# Patient Record
Sex: FEMALE | Race: WHITE | NOT HISPANIC OR LATINO | Employment: OTHER | URBAN - METROPOLITAN AREA
[De-identification: names, ages, dates, MRNs, and addresses within clinical notes are randomized per-mention and may not be internally consistent; named-entity substitution may affect disease eponyms.]

---

## 2018-12-03 ENCOUNTER — OFFICE VISIT (OUTPATIENT)
Dept: FAMILY MEDICINE CLINIC | Facility: CLINIC | Age: 78
End: 2018-12-03
Payer: COMMERCIAL

## 2018-12-03 VITALS
TEMPERATURE: 98.1 F | HEART RATE: 74 BPM | SYSTOLIC BLOOD PRESSURE: 140 MMHG | BODY MASS INDEX: 19.36 KG/M2 | HEIGHT: 62 IN | RESPIRATION RATE: 14 BRPM | WEIGHT: 105.2 LBS | OXYGEN SATURATION: 98 % | DIASTOLIC BLOOD PRESSURE: 100 MMHG

## 2018-12-03 DIAGNOSIS — F03.90 DEMENTIA WITHOUT BEHAVIORAL DISTURBANCE, UNSPECIFIED DEMENTIA TYPE (HCC): ICD-10-CM

## 2018-12-03 DIAGNOSIS — Z12.11 COLON CANCER SCREENING: ICD-10-CM

## 2018-12-03 DIAGNOSIS — Z13.220 SCREENING, LIPID: ICD-10-CM

## 2018-12-03 DIAGNOSIS — B00.1 RECURRENT COLD SORES: ICD-10-CM

## 2018-12-03 DIAGNOSIS — I10 BENIGN HYPERTENSION: Primary | ICD-10-CM

## 2018-12-03 PROCEDURE — 3725F SCREEN DEPRESSION PERFORMED: CPT | Performed by: FAMILY MEDICINE

## 2018-12-03 PROCEDURE — 99204 OFFICE O/P NEW MOD 45 MIN: CPT | Performed by: FAMILY MEDICINE

## 2018-12-03 PROCEDURE — 3008F BODY MASS INDEX DOCD: CPT | Performed by: FAMILY MEDICINE

## 2018-12-03 RX ORDER — AMLODIPINE BESYLATE 5 MG/1
5 TABLET ORAL DAILY
Qty: 30 TABLET | Refills: 0 | Status: SHIPPED | OUTPATIENT
Start: 2018-12-03 | End: 2018-12-06 | Stop reason: HOSPADM

## 2018-12-03 RX ORDER — DONEPEZIL HYDROCHLORIDE 5 MG/1
5 TABLET, FILM COATED ORAL
Qty: 30 TABLET | Refills: 0 | Status: SHIPPED | OUTPATIENT
Start: 2018-12-03 | End: 2019-01-22 | Stop reason: SINTOL

## 2018-12-03 RX ORDER — BENAZEPRIL HYDROCHLORIDE 5 MG/1
5 TABLET, FILM COATED ORAL DAILY
COMMUNITY
End: 2018-12-06 | Stop reason: HOSPADM

## 2018-12-03 NOTE — ASSESSMENT & PLAN NOTE
Will start amlodipine 5 mg and advised to check the bp's at home record the numbers and f/u here in about 3-4 weeks

## 2018-12-03 NOTE — PATIENT INSTRUCTIONS
Discussed all with patient and her daughter  Given mini-mental status exam and scored 25/30  Will start low dose donepezil at night 5 mg and recheck in 3-4 weeks  Will also start amlodipine 5 mg once daily in the morning for the blood pressure  I would prefer to see blood pressures around 130s over 80s  Try to cut back on the salt in the diet  Have the lab work done fasting but drink water before the test   Also try to collect a stool sample before the next appointment  I will review all other previous records from Dr Katie White  Looks like you may need a DEXA scan repeated at the end of this year but I need to review your records first  Try to put some meat on your bones  At least 5 lbs will bring you up to a normal weight  DASH Eating Plan   WHAT YOU NEED TO KNOW:   The DASH (Dietary Approaches to Stop Hypertension) Eating Plan is designed to help prevent or lower high blood pressure  It can also help to lower LDL (bad) cholesterol and decrease your risk of heart disease  The plan is low in sodium, sugar, unhealthy fats, and total fat  It is high in potassium, calcium, magnesium, and fiber  These nutrients are added when you eat more fruits, vegetables, and whole grains  DISCHARGE INSTRUCTIONS:   Your sodium limit each day: Your dietitian will tell you how much sodium is safe for you to have each day  People with high blood pressure should have no more than 1,500 to 2,300 mg of sodium in a day  A teaspoon (tsp) of salt has 2,300 mg of sodium  This may seem like a difficult goal, but small changes to the foods you eat can make a big difference  Your healthcare provider or dietitian can help you create a meal plan that follows your sodium limit  How to limit sodium:   · Read food labels  Food labels can help you choose foods that are low in sodium  The amount of sodium is listed in milligrams (mg)   The % Daily Value (DV) column tells you how much of your daily needs are met by 1 serving of the food for each nutrient listed  Choose foods that have less than 5% of the DV of sodium  These foods are considered low in sodium  Foods that have 20% or more of the DV of sodium are considered high in sodium  Avoid foods that have more than 300 mg of sodium in each serving  Choose foods that say low-sodium, reduced-sodium, or no salt added on the food label  · Avoid salt  Do not salt food at the table, and add very little salt to foods during cooking  Use herbs and spices, such as onions, garlic, and salt-free seasonings to add flavor to foods  Try lemon or lime juice or vinegar to give foods a tart flavor  Use hot peppers or a small amount of hot pepper sauce to add a spicy flavor to foods  · Ask about salt substitutes  Ask your healthcare provider if you may use salt substitutes  Some salt substitutes have ingredients that can be harmful if you have certain health conditions  · Choose foods carefully at restaurants  Meals from restaurants, especially fast food restaurants, are often high in sodium  Some restaurants have nutrition information that tells you the amount of sodium in their foods  Ask to have your food prepared with less, or no salt  What you need to know about fats:   · Include healthy fats  Examples are unsaturated fats and omega-3 fatty acids  Unsaturated fats are found in soybean, canola, olive, or sunflower oil, and liquid and soft tub margarines  Omega-3 fatty acids are found in fatty fish, such as salmon, tuna, mackerel, and sardines  It is also found in flaxseed oil and ground flaxseed  · Avoid unhealthy fats  Do not eat unhealthy fats, such as saturated fats and trans fats  Saturated fats are found in foods that contain fat from animals  Examples are fatty meats, whole milk, butter, cream, and other dairy foods  It is also found in shortening, stick margarine, palm oil, and coconut oil   Trans fats are found in fried foods, crackers, chips, and baked goods made with margarine or shortening  Foods to include: With the DASH eating plan, you need to eat a certain number of servings from each food group  This will help you get enough of certain nutrients and limit others  The amount of servings you should eat depends on how many calories you need  Your dietitian can tell you how many calories you need  The number of servings listed next to the food groups below are for people who need about 2,000 calories each day    · Grains:  6 to 8 servings (3 of these servings should be whole-grain foods)    ¨ 1 slice of whole-grain bread     ¨ 1 ounce of dry cereal    ¨ ½ cup of cooked cereal, pasta, or brown rice    · Vegetables and fruits:  4 to 5 servings of fruits and 4 to 5 servings of vegetables    ¨ 1 medium fruit    ¨ ½ cup of frozen, canned (no added salt), or chopped fresh vegetables     ¨ ½ cup of fresh, frozen, dried, or canned fruit (canned in light syrup or fruit juice)    ¨ ½ cup of vegetable or fruit juice    · Dairy:  2 to 3 servings    ¨ 1 cup of nonfat (skim) or 1% milk    ¨ 1½ ounces of fat-free or low-fat cheese    ¨ 6 ounces of nonfat or low-fat yogurt    · Lean meat, poultry, and fish:  6 ounces or less    [employer] (chicken, turkey) with no skin    ¨ Fish (especially fatty fish, such as salmon, fresh tuna, or mackerel)    ¨ Lean beef and pork (loin, round, extra lean hamburger)    ¨ Egg whites and egg substitutes    · Nuts, seeds, and legumes:  4 to 5 servings each week    ¨ ½ cup of cooked beans and peas    ¨ 1½ ounces of unsalted nuts    ¨ 2 tablespoons of peanut butter or seeds    · Sweets and added sugars:  5 or less each week    ¨ 1 tablespoon of sugar, jelly, or jam    ¨ ½ cup of sorbet or gelatin    ¨ 1 cup of lemonade    · Fats:  2 to 3 servings each week    ¨ 1 teaspoon of soft margarine or vegetable oil    ¨ 1 tablespoon of mayonnaise    ¨ 2 tablespoons of salad dressing  Foods to avoid:   · Grains:      [employer], such as doughnuts, pastries, cookies, and biscuits (high in fat and sugar)    ¨ Mixes for cornbread and biscuits, packaged foods, such as bread stuffing, rice and pasta mixes, macaroni and cheese, and instant cereals (high in sodium)    · Fruits and vegetables:      ¨ Regular, canned vegetables (high in sodium)    ¨ Sauerkraut, pickled vegetables, and other foods prepared in brine (high in sodium)    ¨ Fried vegetables or vegetables in butter or high-fat sauces    ¨ Fruit in cream or butter sauce (high in fat)    · Dairy:      ¨ Whole milk, 2% milk, and cream (high in fat)    ¨ Regular cheese and processed cheese (high in fat and sodium)    · Meats and protein foods:      ¨ Smoked or cured meat, such as corned beef, dang, ham, hot dogs, and sausage (high in fat and sodium)    ¨ Canned beans and canned meats or spreads, such as potted meats, sardines, anchovies, and imitation seafood (high in sodium)    ¨ Deli or lunch meats, such as bologna, ham, turkey, and roast beef (high in sodium)    ¨ High-fat meat (T-bone steak, regular hamburger, and ribs)    ¨ Whole eggs and egg yolks (high in fat)    · Other:      ¨ Seasonings made with salt, such as garlic salt, celery salt, onion salt, seasoned salt, meat tenderizers, and monosodium glutamate (MSG)    ¨ Miso soup and canned or dried soup mixes (high in sodium)    ¨ Regular soy sauce, barbecue sauce, teriyaki sauce, steak sauce, Worcestershire sauce, and most flavored vinegars (high in sodium)    ¨ Regular condiments, such as mustard, ketchup, and salad dressings (high in sodium)    ¨ Gravy and sauces, such as Honorio or cheese sauces (high in sodium and fat)    ¨ Drinks high in sugar, such as soda or fruit drinks    [employer] foods, such as salted chips, popcorn, pretzels, pork rinds, salted crackers, and salted nuts    ¨ Frozen foods, such as dinners, entrees, vegetables with sauces, and breaded meats (high in sodium)  Other guidelines to follow:   · Maintain a healthy weight    Your risk for heart disease is higher if you are overweight  Your healthcare provider may suggest that you lose weight if you are overweight  You can lose weight by eating fewer calories and foods that have added sugars and fat  The DASH meal plan can help you do this  Decrease calories by eating smaller portions at each meal and fewer snacks  Ask your healthcare provider for more information about how to lose weight  · Exercise regularly  Regular exercise can help you reach or maintain a healthy weight  Regular exercise can also help decrease your blood pressure and improve your cholesterol levels  Get 30 minutes or more of moderate exercise each day of the week  To lose weight, get at least 60 minutes of exercise  Talk to your healthcare provider about the best exercise program for you  · Limit alcohol  Women should limit alcohol to 1 drink a day  Men should limit alcohol to 2 drinks a day  A drink of alcohol is 12 ounces of beer, 5 ounces of wine, or 1½ ounces of liquor  © 2017 [address] Information is for End User's use only and may not be sold, redistributed or otherwise used for commercial purposes  All illustrations and images included in CareNotes® are the copyrighted property of [employer]  or [employer]  The above information is an  only  It is not intended as medical advice for individual conditions or treatments  Talk to your doctor, nurse or pharmacist before following any medical regimen to see if it is safe and effective for you

## 2018-12-03 NOTE — PROGRESS NOTES
Assessment/Plan:     Chronic Problems:  Benign hypertension  Will start amlodipine 5 mg and advised to check the bp's at home record the numbers and f/u here in about 3-4 weeks  Dementia without behavioral disturbance  Will add in aricept at bedtime and follow in 3-4 weeks  Visit Diagnosis:  Diagnoses and all orders for this visit:    Benign hypertension  -     Comprehensive metabolic panel; Future  -     Microalbumin / creatinine urine ratio  -     Urinalysis with microscopic  -     amLODIPine (NORVASC) 5 mg tablet; Take 1 tablet (5 mg total) by mouth daily    Dementia without behavioral disturbance, unspecified dementia type  -     TSH, 3rd generation with Free T4 reflex; Future  -     Vitamin B12; Future  -     Lyme Antibody Profile with reflex to WB; Future  -     donepezil (ARICEPT) 5 mg tablet; Take 1 tablet (5 mg total) by mouth daily at bedtime    Colon cancer screening  -     Occult Blood, Fecal Immunochemical; Future    Screening, lipid  -     Lipid panel; Future    Recurrent cold sores  Comments: May want to try l-lysine and if still with cold sores would consider valtrex  Other orders  -     benazepril (LOTENSIN) 5 mg tablet; Take 5 mg by mouth daily          Subjective:    Patient ID: Keesha Brasher is a [de-identified] y o  female  Pt is here ac by her step-daughter, Olivia Felix  Pt was followed by Dr Conti but he retired  Pt has no special concerns re: being here today  Pt was on 2 5 mg of lisinopril but that was increased to 5 mg  Has a bp machine at home but does not know how to check it  Pt states Dr Riri Multani would not give her a new script for the lisinopril  Felt very dizzy when she stopped the meds and has not been on meds since September  Daughter in law has concerns re: pt's memory  Not currently on any meds  Pt was just seen by Dr Kimberlee Doran  Thinks she had a pap  Pt also has concerns that she has frequent cold sores           The following portions of the patient's history were reviewed and updated as appropriate: allergies, current medications, past family history, past medical history, past social history, past surgical history and problem list     Review of Systems   Constitutional: Negative for chills, diaphoresis, fatigue and fever  HENT: Negative  Eyes: Negative  Respiratory: Positive for cough (mild cough with minimal phlegm  )  Negative for shortness of breath and wheezing  Cardiovascular: Negative for chest pain and palpitations  Gastrointestinal: Negative  Last colonoscopy unknown  Genitourinary: Positive for frequency  Negative for dysuria and urgency  Musculoskeletal: Negative  Neurological: Negative for dizziness, light-headedness and headaches  Psychiatric/Behavioral: Negative for dysphoric mood  The patient is not nervous/anxious  Living alone since her  passed away  /100   Pulse 74   Temp 98 1 °F (36 7 °C)   Resp 14   Ht 5' 1 75" (1 568 m)   Wt 47 7 kg (105 lb 3 2 oz)   SpO2 98%   BMI 19 40 kg/m²   Social History     Social History    Marital status:      Spouse name: N/A    Number of children: N/A    Years of education: N/A     Occupational History    Not on file  Social History Main Topics    Smoking status: Never Smoker    Smokeless tobacco: Never Used    Alcohol use Not on file    Drug use: No    Sexual activity: No     Other Topics Concern    Not on file     Social History Narrative    No narrative on file     No past medical history on file  No family history on file  No past surgical history on file      Current Outpatient Prescriptions:     benazepril (LOTENSIN) 5 mg tablet, Take 5 mg by mouth daily, Disp: , Rfl:     amLODIPine (NORVASC) 5 mg tablet, Take 1 tablet (5 mg total) by mouth daily, Disp: 30 tablet, Rfl: 0    donepezil (ARICEPT) 5 mg tablet, Take 1 tablet (5 mg total) by mouth daily at bedtime, Disp: 30 tablet, Rfl: 0    Allergies   Allergen Reactions    Cefazolin Other reaction(s): Unknown          Lab Review   No visits with results within 2 Month(s) from this visit  Latest known visit with results is:   No results found for any previous visit  Imaging: No results found  Objective:     Physical Exam   Constitutional: She is oriented to person, place, and time  She appears well-developed and well-nourished  No distress  HENT:   Head: Normocephalic and atraumatic  Right Ear: External ear normal    Left Ear: External ear normal    Mouth/Throat: Oropharynx is clear and moist    Eyes: Pupils are equal, round, and reactive to light  Conjunctivae and EOM are normal  Right eye exhibits no discharge  Left eye exhibits no discharge  Neck: Normal range of motion  Neck supple  No thyromegaly present  Cardiovascular: Normal rate, regular rhythm and normal heart sounds  Exam reveals no friction rub  No murmur heard  Pulmonary/Chest: Effort normal and breath sounds normal  No respiratory distress  She has no wheezes  She has no rales  She exhibits no tenderness  Abdominal: Soft  Bowel sounds are normal  There is no tenderness  Musculoskeletal: Normal range of motion  She exhibits no edema, tenderness or deformity  Lymphadenopathy:     She has no cervical adenopathy  Neurological: She is alert and oriented to person, place, and time  No cranial nerve deficit  Pt is pleasantly forgetful  MMSE 25/51  Skin: Skin is warm and dry  No rash noted  She is not diaphoretic  Psychiatric: She has a normal mood and affect  Her behavior is normal  Judgment and thought content normal              Patient Instructions     Discussed all with patient and her daughter  Given mini-mental status exam and scored 25/30  Will start low dose donepezil at night 5 mg and recheck in 3-4 weeks  Will also start amlodipine 5 mg once daily in the morning for the blood pressure  I would prefer to see blood pressures around 130s over 80s  Try to cut back on the salt in the diet  Have the lab work done fasting but drink water before the test   Also try to collect a stool sample before the next appointment  I will review all other previous records from Dr Katie White  Looks like you may need a DEXA scan repeated at the end of this year but I need to review your records first  Try to put some meat on your bones  At least 5 lbs will bring you up to a normal weight  DASH Eating Plan   WHAT YOU NEED TO KNOW:   The DASH (Dietary Approaches to Stop Hypertension) Eating Plan is designed to help prevent or lower high blood pressure  It can also help to lower LDL (bad) cholesterol and decrease your risk of heart disease  The plan is low in sodium, sugar, unhealthy fats, and total fat  It is high in potassium, calcium, magnesium, and fiber  These nutrients are added when you eat more fruits, vegetables, and whole grains  DISCHARGE INSTRUCTIONS:   Your sodium limit each day: Your dietitian will tell you how much sodium is safe for you to have each day  People with high blood pressure should have no more than 1,500 to 2,300 mg of sodium in a day  A teaspoon (tsp) of salt has 2,300 mg of sodium  This may seem like a difficult goal, but small changes to the foods you eat can make a big difference  Your healthcare provider or dietitian can help you create a meal plan that follows your sodium limit  How to limit sodium:   · Read food labels  Food labels can help you choose foods that are low in sodium  The amount of sodium is listed in milligrams (mg)  The % Daily Value (DV) column tells you how much of your daily needs are met by 1 serving of the food for each nutrient listed  Choose foods that have less than 5% of the DV of sodium  These foods are considered low in sodium  Foods that have 20% or more of the DV of sodium are considered high in sodium  Avoid foods that have more than 300 mg of sodium in each serving   Choose foods that say low-sodium, reduced-sodium, or no salt added on the food label            · Avoid salt  Do not salt food at the table, and add very little salt to foods during cooking  Use herbs and spices, such as onions, garlic, and salt-free seasonings to add flavor to foods  Try lemon or lime juice or vinegar to give foods a tart flavor  Use hot peppers or a small amount of hot pepper sauce to add a spicy flavor to foods  · Ask about salt substitutes  Ask your healthcare provider if you may use salt substitutes  Some salt substitutes have ingredients that can be harmful if you have certain health conditions  · Choose foods carefully at restaurants  Meals from restaurants, especially fast food restaurants, are often high in sodium  Some restaurants have nutrition information that tells you the amount of sodium in their foods  Ask to have your food prepared with less, or no salt  What you need to know about fats:   · Include healthy fats  Examples are unsaturated fats and omega-3 fatty acids  Unsaturated fats are found in soybean, canola, olive, or sunflower oil, and liquid and soft tub margarines  Omega-3 fatty acids are found in fatty fish, such as salmon, tuna, mackerel, and sardines  It is also found in flaxseed oil and ground flaxseed  · Avoid unhealthy fats  Do not eat unhealthy fats, such as saturated fats and trans fats  Saturated fats are found in foods that contain fat from animals  Examples are fatty meats, whole milk, butter, cream, and other dairy foods  It is also found in shortening, stick margarine, palm oil, and coconut oil  Trans fats are found in fried foods, crackers, chips, and baked goods made with margarine or shortening  Foods to include: With the DASH eating plan, you need to eat a certain number of servings from each food group  This will help you get enough of certain nutrients and limit others  The amount of servings you should eat depends on how many calories you need  Your dietitian can tell you how many calories you need   The number of servings listed next to the food groups below are for people who need about 2,000 calories each day    · Grains:  6 to 8 servings (3 of these servings should be whole-grain foods)    ¨ 1 slice of whole-grain bread     ¨ 1 ounce of dry cereal    ¨ ½ cup of cooked cereal, pasta, or brown rice    · Vegetables and fruits:  4 to 5 servings of fruits and 4 to 5 servings of vegetables    ¨ 1 medium fruit    ¨ ½ cup of frozen, canned (no added salt), or chopped fresh vegetables     ¨ ½ cup of fresh, frozen, dried, or canned fruit (canned in light syrup or fruit juice)    ¨ ½ cup of vegetable or fruit juice    · Dairy:  2 to 3 servings    ¨ 1 cup of nonfat (skim) or 1% milk    ¨ 1½ ounces of fat-free or low-fat cheese    ¨ 6 ounces of nonfat or low-fat yogurt    · Lean meat, poultry, and fish:  6 ounces or less    [employer] (chicken, turkey) with no skin    ¨ Fish (especially fatty fish, such as salmon, fresh tuna, or mackerel)    ¨ Lean beef and pork (loin, round, extra lean hamburger)    ¨ Egg whites and egg substitutes    · Nuts, seeds, and legumes:  4 to 5 servings each week    ¨ ½ cup of cooked beans and peas    ¨ 1½ ounces of unsalted nuts    ¨ 2 tablespoons of peanut butter or seeds    · Sweets and added sugars:  5 or less each week    ¨ 1 tablespoon of sugar, jelly, or jam    ¨ ½ cup of sorbet or gelatin    ¨ 1 cup of lemonade    · Fats:  2 to 3 servings each week    ¨ 1 teaspoon of soft margarine or vegetable oil    ¨ 1 tablespoon of mayonnaise    ¨ 2 tablespoons of salad dressing  Foods to avoid:   · Grains:      [employer], such as doughnuts, pastries, cookies, and biscuits (high in fat and sugar)    ¨ Mixes for cornbread and biscuits, packaged foods, such as bread stuffing, rice and pasta mixes, macaroni and cheese, and instant cereals (high in sodium)    · Fruits and vegetables:      ¨ Regular, canned vegetables (high in sodium)    ¨ Sauerkraut, pickled vegetables, and other foods prepared in brine (high in sodium)    ¨ Fried vegetables or vegetables in butter or high-fat sauces    ¨ Fruit in cream or butter sauce (high in fat)    · Dairy:      ¨ Whole milk, 2% milk, and cream (high in fat)    ¨ Regular cheese and processed cheese (high in fat and sodium)    · Meats and protein foods:      ¨ Smoked or cured meat, such as corned beef, dang, ham, hot dogs, and sausage (high in fat and sodium)    ¨ Canned beans and canned meats or spreads, such as potted meats, sardines, anchovies, and imitation seafood (high in sodium)    ¨ Deli or lunch meats, such as bologna, ham, turkey, and roast beef (high in sodium)    ¨ High-fat meat (T-bone steak, regular hamburger, and ribs)    ¨ Whole eggs and egg yolks (high in fat)    · Other:      ¨ Seasonings made with salt, such as garlic salt, celery salt, onion salt, seasoned salt, meat tenderizers, and monosodium glutamate (MSG)    ¨ Miso soup and canned or dried soup mixes (high in sodium)    ¨ Regular soy sauce, barbecue sauce, teriyaki sauce, steak sauce, Worcestershire sauce, and most flavored vinegars (high in sodium)    ¨ Regular condiments, such as mustard, ketchup, and salad dressings (high in sodium)    ¨ Gravy and sauces, such as Honorio or cheese sauces (high in sodium and fat)    ¨ Drinks high in sugar, such as soda or fruit drinks    [employer] foods, such as salted chips, popcorn, pretzels, pork rinds, salted crackers, and salted nuts    ¨ Frozen foods, such as dinners, entrees, vegetables with sauces, and breaded meats (high in sodium)  Other guidelines to follow:   · Maintain a healthy weight  Your risk for heart disease is higher if you are overweight  Your healthcare provider may suggest that you lose weight if you are overweight  You can lose weight by eating fewer calories and foods that have added sugars and fat  The DASH meal plan can help you do this  Decrease calories by eating smaller portions at each meal and fewer snacks   Ask your healthcare provider for more information about how to lose weight  · Exercise regularly  Regular exercise can help you reach or maintain a healthy weight  Regular exercise can also help decrease your blood pressure and improve your cholesterol levels  Get 30 minutes or more of moderate exercise each day of the week  To lose weight, get at least 60 minutes of exercise  Talk to your healthcare provider about the best exercise program for you  · Limit alcohol  Women should limit alcohol to 1 drink a day  Men should limit alcohol to 2 drinks a day  A drink of alcohol is 12 ounces of beer, 5 ounces of wine, or 1½ ounces of liquor  © 2017 [facility] Information is for End User's use only and may not be sold, redistributed or otherwise used for commercial purposes  All illustrations and images included in CareNotes® are the copyrighted property of [employer]  or [employer]  The above information is an  only  It is not intended as medical advice for individual conditions or treatments  Talk to your doctor, nurse or pharmacist before following any medical regimen to see if it is safe and effective for you          DASIA Joaquin

## 2018-12-05 ENCOUNTER — APPOINTMENT (OUTPATIENT)
Dept: NON INVASIVE DIAGNOSTICS | Facility: HOSPITAL | Age: 78
End: 2018-12-05
Payer: COMMERCIAL

## 2018-12-05 ENCOUNTER — APPOINTMENT (EMERGENCY)
Dept: CT IMAGING | Facility: HOSPITAL | Age: 78
End: 2018-12-05
Payer: COMMERCIAL

## 2018-12-05 ENCOUNTER — HOSPITAL ENCOUNTER (OUTPATIENT)
Facility: HOSPITAL | Age: 78
Setting detail: OBSERVATION
Discharge: HOME | End: 2018-12-06
Attending: EMERGENCY MEDICINE | Admitting: INTERNAL MEDICINE
Payer: COMMERCIAL

## 2018-12-05 DIAGNOSIS — I71.2 ASCENDING AORTIC ANEURYSM (HCC): ICD-10-CM

## 2018-12-05 DIAGNOSIS — R19.7 NAUSEA, VOMITING, AND DIARRHEA: ICD-10-CM

## 2018-12-05 DIAGNOSIS — R11.2 NAUSEA, VOMITING, AND DIARRHEA: ICD-10-CM

## 2018-12-05 DIAGNOSIS — R91.1 PULMONARY NODULE: ICD-10-CM

## 2018-12-05 DIAGNOSIS — R55 NEAR SYNCOPE: ICD-10-CM

## 2018-12-05 DIAGNOSIS — R42 LIGHTHEADEDNESS: Primary | ICD-10-CM

## 2018-12-05 DIAGNOSIS — I10 BENIGN HYPERTENSION: ICD-10-CM

## 2018-12-05 PROBLEM — I71.21 ASCENDING AORTIC ANEURYSM: Status: ACTIVE | Noted: 2018-12-05

## 2018-12-05 LAB
ALBUMIN SERPL BCP-MCNC: 4.6 G/DL (ref 3.5–5)
ALP SERPL-CCNC: 77 U/L (ref 46–116)
ALT SERPL W P-5'-P-CCNC: 21 U/L (ref 12–78)
ANION GAP SERPL CALCULATED.3IONS-SCNC: 15 MMOL/L (ref 4–13)
APTT PPP: 25 SECONDS (ref 26–38)
AST SERPL W P-5'-P-CCNC: 18 U/L (ref 5–45)
ATRIAL RATE: 76 BPM
BASOPHILS # BLD AUTO: 0.08 THOUSANDS/ΜL (ref 0–0.1)
BASOPHILS NFR BLD AUTO: 1 % (ref 0–1)
BILIRUB DIRECT SERPL-MCNC: 0.33 MG/DL (ref 0–0.2)
BILIRUB SERPL-MCNC: 1.9 MG/DL (ref 0.2–1)
BILIRUB UR QL STRIP: NEGATIVE
BUN SERPL-MCNC: 16 MG/DL (ref 5–25)
CALCIUM SERPL-MCNC: 9.4 MG/DL (ref 8.3–10.1)
CHLORIDE SERPL-SCNC: 99 MMOL/L (ref 100–108)
CLARITY UR: CLEAR
CO2 SERPL-SCNC: 26 MMOL/L (ref 21–32)
COLOR UR: ABNORMAL
CREAT SERPL-MCNC: 0.98 MG/DL (ref 0.6–1.3)
EOSINOPHIL # BLD AUTO: 0.01 THOUSAND/ΜL (ref 0–0.61)
EOSINOPHIL NFR BLD AUTO: 0 % (ref 0–6)
ERYTHROCYTE [DISTWIDTH] IN BLOOD BY AUTOMATED COUNT: 12 % (ref 11.6–15.1)
FLUAV AG SPEC QL IA: NEGATIVE
FLUBV AG SPEC QL IA: NEGATIVE
GFR SERPL CREATININE-BSD FRML MDRD: 55 ML/MIN/1.73SQ M
GLUCOSE SERPL-MCNC: 132 MG/DL (ref 65–140)
GLUCOSE UR STRIP-MCNC: NEGATIVE MG/DL
HCT VFR BLD AUTO: 45.2 % (ref 34.8–46.1)
HGB BLD-MCNC: 15.6 G/DL (ref 11.5–15.4)
HGB UR QL STRIP.AUTO: NEGATIVE
IMM GRANULOCYTES # BLD AUTO: 0.02 THOUSAND/UL (ref 0–0.2)
IMM GRANULOCYTES NFR BLD AUTO: 0 % (ref 0–2)
INR PPP: 0.89 (ref 0.86–1.17)
KETONES UR STRIP-MCNC: ABNORMAL MG/DL
LACTATE SERPL-SCNC: 1.4 MMOL/L (ref 0.5–2)
LEUKOCYTE ESTERASE UR QL STRIP: NEGATIVE
LIPASE SERPL-CCNC: 125 U/L (ref 73–393)
LYMPHOCYTES # BLD AUTO: 0.75 THOUSANDS/ΜL (ref 0.6–4.47)
LYMPHOCYTES NFR BLD AUTO: 10 % (ref 14–44)
MAGNESIUM SERPL-MCNC: 1.9 MG/DL (ref 1.6–2.6)
MCH RBC QN AUTO: 32.1 PG (ref 26.8–34.3)
MCHC RBC AUTO-ENTMCNC: 34.5 G/DL (ref 31.4–37.4)
MCV RBC AUTO: 93 FL (ref 82–98)
MONOCYTES # BLD AUTO: 0.2 THOUSAND/ΜL (ref 0.17–1.22)
MONOCYTES NFR BLD AUTO: 3 % (ref 4–12)
NEUTROPHILS # BLD AUTO: 6.31 THOUSANDS/ΜL (ref 1.85–7.62)
NEUTS SEG NFR BLD AUTO: 86 % (ref 43–75)
NITRITE UR QL STRIP: NEGATIVE
NRBC BLD AUTO-RTO: 0 /100 WBCS
P AXIS: 59 DEGREES
PH UR STRIP.AUTO: 7.5 [PH] (ref 4.5–8)
PLATELET # BLD AUTO: 335 THOUSANDS/UL (ref 149–390)
PMV BLD AUTO: 9.8 FL (ref 8.9–12.7)
POTASSIUM SERPL-SCNC: 3.2 MMOL/L (ref 3.5–5.3)
PR INTERVAL: 170 MS
PROT SERPL-MCNC: 8.3 G/DL (ref 6.4–8.2)
PROT UR STRIP-MCNC: NEGATIVE MG/DL
PROTHROMBIN TIME: 12 SECONDS (ref 11.8–14.2)
QRS AXIS: -24 DEGREES
QRSD INTERVAL: 88 MS
QT INTERVAL: 422 MS
QTC INTERVAL: 474 MS
RBC # BLD AUTO: 4.86 MILLION/UL (ref 3.81–5.12)
SODIUM SERPL-SCNC: 140 MMOL/L (ref 136–145)
SP GR UR STRIP.AUTO: 1.01 (ref 1–1.03)
T WAVE AXIS: 73 DEGREES
TROPONIN I SERPL-MCNC: <0.02 NG/ML
TSH SERPL DL<=0.05 MIU/L-ACNC: 3.01 UIU/ML (ref 0.36–3.74)
UROBILINOGEN UR QL STRIP.AUTO: 0.2 E.U./DL
VENTRICULAR RATE: 76 BPM
WBC # BLD AUTO: 7.37 THOUSAND/UL (ref 4.31–10.16)

## 2018-12-05 PROCEDURE — 84484 ASSAY OF TROPONIN QUANT: CPT | Performed by: EMERGENCY MEDICINE

## 2018-12-05 PROCEDURE — 96374 THER/PROPH/DIAG INJ IV PUSH: CPT

## 2018-12-05 PROCEDURE — 99285 EMERGENCY DEPT VISIT HI MDM: CPT

## 2018-12-05 PROCEDURE — 93005 ELECTROCARDIOGRAM TRACING: CPT

## 2018-12-05 PROCEDURE — 96361 HYDRATE IV INFUSION ADD-ON: CPT

## 2018-12-05 PROCEDURE — 81003 URINALYSIS AUTO W/O SCOPE: CPT | Performed by: EMERGENCY MEDICINE

## 2018-12-05 PROCEDURE — 94664 DEMO&/EVAL PT USE INHALER: CPT

## 2018-12-05 PROCEDURE — 74177 CT ABD & PELVIS W/CONTRAST: CPT

## 2018-12-05 PROCEDURE — 85025 COMPLETE CBC W/AUTO DIFF WBC: CPT | Performed by: EMERGENCY MEDICINE

## 2018-12-05 PROCEDURE — 85610 PROTHROMBIN TIME: CPT | Performed by: EMERGENCY MEDICINE

## 2018-12-05 PROCEDURE — 83735 ASSAY OF MAGNESIUM: CPT | Performed by: EMERGENCY MEDICINE

## 2018-12-05 PROCEDURE — 84443 ASSAY THYROID STIM HORMONE: CPT | Performed by: NURSE PRACTITIONER

## 2018-12-05 PROCEDURE — 83605 ASSAY OF LACTIC ACID: CPT | Performed by: EMERGENCY MEDICINE

## 2018-12-05 PROCEDURE — 80048 BASIC METABOLIC PNL TOTAL CA: CPT | Performed by: EMERGENCY MEDICINE

## 2018-12-05 PROCEDURE — 85730 THROMBOPLASTIN TIME PARTIAL: CPT | Performed by: EMERGENCY MEDICINE

## 2018-12-05 PROCEDURE — 71260 CT THORAX DX C+: CPT

## 2018-12-05 PROCEDURE — 94760 N-INVAS EAR/PLS OXIMETRY 1: CPT

## 2018-12-05 PROCEDURE — 70450 CT HEAD/BRAIN W/O DYE: CPT

## 2018-12-05 PROCEDURE — 87040 BLOOD CULTURE FOR BACTERIA: CPT | Performed by: EMERGENCY MEDICINE

## 2018-12-05 PROCEDURE — 83690 ASSAY OF LIPASE: CPT | Performed by: EMERGENCY MEDICINE

## 2018-12-05 PROCEDURE — 87631 RESP VIRUS 3-5 TARGETS: CPT | Performed by: EMERGENCY MEDICINE

## 2018-12-05 PROCEDURE — 80076 HEPATIC FUNCTION PANEL: CPT | Performed by: EMERGENCY MEDICINE

## 2018-12-05 PROCEDURE — 36415 COLL VENOUS BLD VENIPUNCTURE: CPT | Performed by: EMERGENCY MEDICINE

## 2018-12-05 PROCEDURE — 99220 PR INITIAL OBSERVATION CARE/DAY 70 MINUTES: CPT | Performed by: NURSE PRACTITIONER

## 2018-12-05 PROCEDURE — 93010 ELECTROCARDIOGRAM REPORT: CPT | Performed by: INTERNAL MEDICINE

## 2018-12-05 PROCEDURE — 93306 TTE W/DOPPLER COMPLETE: CPT

## 2018-12-05 RX ORDER — POTASSIUM CHLORIDE 20 MEQ/1
40 TABLET, EXTENDED RELEASE ORAL ONCE
Status: COMPLETED | OUTPATIENT
Start: 2018-12-05 | End: 2018-12-05

## 2018-12-05 RX ORDER — DONEPEZIL HYDROCHLORIDE 5 MG/1
5 TABLET, FILM COATED ORAL
Status: DISCONTINUED | OUTPATIENT
Start: 2018-12-05 | End: 2018-12-06 | Stop reason: HOSPADM

## 2018-12-05 RX ORDER — ONDANSETRON 2 MG/ML
4 INJECTION INTRAMUSCULAR; INTRAVENOUS ONCE
Status: COMPLETED | OUTPATIENT
Start: 2018-12-05 | End: 2018-12-05

## 2018-12-05 RX ORDER — LISINOPRIL 5 MG/1
5 TABLET ORAL DAILY
Status: DISCONTINUED | OUTPATIENT
Start: 2018-12-05 | End: 2018-12-06 | Stop reason: HOSPADM

## 2018-12-05 RX ORDER — LISINOPRIL 5 MG/1
5 TABLET ORAL DAILY
Status: DISCONTINUED | OUTPATIENT
Start: 2018-12-06 | End: 2018-12-05

## 2018-12-05 RX ORDER — SODIUM CHLORIDE 9 MG/ML
75 INJECTION, SOLUTION INTRAVENOUS CONTINUOUS
Status: DISCONTINUED | OUTPATIENT
Start: 2018-12-05 | End: 2018-12-06 | Stop reason: HOSPADM

## 2018-12-05 RX ORDER — ACETAMINOPHEN 325 MG/1
650 TABLET ORAL EVERY 6 HOURS PRN
Status: DISCONTINUED | OUTPATIENT
Start: 2018-12-05 | End: 2018-12-06 | Stop reason: HOSPADM

## 2018-12-05 RX ORDER — ONDANSETRON 2 MG/ML
4 INJECTION INTRAMUSCULAR; INTRAVENOUS EVERY 6 HOURS PRN
Status: DISCONTINUED | OUTPATIENT
Start: 2018-12-05 | End: 2018-12-06 | Stop reason: HOSPADM

## 2018-12-05 RX ADMIN — IOHEXOL 100 ML: 350 INJECTION, SOLUTION INTRAVENOUS at 13:41

## 2018-12-05 RX ADMIN — POTASSIUM CHLORIDE 40 MEQ: 1500 TABLET, EXTENDED RELEASE ORAL at 13:52

## 2018-12-05 RX ADMIN — SODIUM CHLORIDE 1000 ML: 0.9 INJECTION, SOLUTION INTRAVENOUS at 13:04

## 2018-12-05 RX ADMIN — DONEPEZIL HYDROCHLORIDE 5 MG: 5 TABLET ORAL at 21:24

## 2018-12-05 RX ADMIN — ONDANSETRON 4 MG: 2 INJECTION INTRAMUSCULAR; INTRAVENOUS at 13:03

## 2018-12-05 RX ADMIN — POTASSIUM CHLORIDE 40 MEQ: 1500 TABLET, EXTENDED RELEASE ORAL at 17:10

## 2018-12-05 RX ADMIN — SODIUM CHLORIDE 75 ML/HR: 0.9 INJECTION, SOLUTION INTRAVENOUS at 17:09

## 2018-12-05 RX ADMIN — LISINOPRIL 5 MG: 5 TABLET ORAL at 17:28

## 2018-12-05 NOTE — ASSESSMENT & PLAN NOTE
· Continue newly started Aricept 5 mg at HS by PCP on 12/3/2018  · PT/OT eval and treat for discharge recommendations as patient lives home alone

## 2018-12-05 NOTE — ASSESSMENT & PLAN NOTE
Likely due to dehydration from viral gastroenteritis with nausea vomiting  · Check orthostatic blood pressures every shift  · Start IV hydration  · Consider need for Luis Alberto stockings  · Monitor blood pressure closely

## 2018-12-05 NOTE — H&P
H&P- Tita Max 1940, [de-identified] y o  female MRN: [id number]    Unit/Bed#: ED 17 Encounter: [id number]    Primary Care Provider: DASIA Zhu   Date and time admitted to hospital: 12/5/2018 12:15 PM        Dizziness   Assessment & Plan    Likely due to dehydration from viral gastroenteritis with nausea vomiting  · Check orthostatic blood pressures every shift  · Start IV hydration  · Consider need for Luis Alberto stockings  · Monitor blood pressure closely     Nausea & vomiting   Assessment & Plan    Likely due to viral gastroenteritis  · Start IV hydration and IV Zofran as needed     Ascending aortic aneurysm [facility])   Assessment & Plan    Incidental finding on CT chest abdomen pelvis  CT revealed a dilated ascending aorta measuring 4 1 cm  · Follow-up echocardiogram  · Blood pressure control     Benign hypertension   Assessment & Plan    Blood pressure elevated on arrival at 183/88  Now 165/76  Incidentally found an ascending aortic aneurysm at 4 1 cm  · Ideally need to lower blood pressure in light of aortic aneurysm  · Follow-up echocardiogram  · Continue ACEi  · Hold Norvasc      Nodule of left lung   Assessment & Plan    CT chest abdomen pelvis:  Incidentally detected 3 mm nodule in the left lower lung  No routine follow-up is needed as patient is considered low risk for lung cancer  Outpatient follow-up with PCP     Dementia without behavioral disturbance   Assessment & Plan    · Continue newly started Aricept 5 mg at HS by PCP on 12/3/2018  · PT/OT eval and treat for discharge recommendations as patient lives home alone       VTE Prophylaxis: Enoxaparin (Lovenox)  / sequential compression device   Code Status:  Full code level 1  POLST: POLST form is not discussed and not completed at this time  Discussion with family:  Daughter at bedside    Anticipated Length of Stay:  Patient will be admitted on an Observation basis with an anticipated length of stay of  Less than 2 midnights  Justification for Hospital Stay: dehydration, hypokalemia, vomiting, diarrhea    Total Time for Visit, including Counseling / Coordination of Care: 1 hour  Greater than 50% of this total time spent on direct patient counseling and coordination of care  Chief Complaint:   Vomiting, diarrhea     History of Present Illness:    John Sullivan is a [de-identified] y o  female with a PMH including dementia, HTN, CKD, osteoporosis who presents with several days of lightheadedness and dizziness associated with acute onset nausea, vomiting, and diarrhea  Patient is a relatively poor historian due to baseline dementia  Her daughter is at bedside and is a good historian  Patient cannot clearly state when her lightheadedness and dizziness started, however, states it has been going on for the past several days  States her nausea and vomiting started yesterday evening and continued until this morning  She reports 1 episode of diarrhea  She did see a new family doctor on Monday who started her on Norvasc and Aricept in addition to Benazepril  Patient states she has not taken blood pressure medication in 3 months because her previous family doctor took her off of her medications  Patient denies any headache, chest tightness, shortness of breath, abdominal pain, hematuria, dysuria, melena, or dark stools  Review of Systems:    Review of Systems   Constitutional: Positive for appetite change  Negative for chills and fever  HENT: Negative for congestion, postnasal drip, rhinorrhea and sore throat  Eyes: Negative for photophobia and visual disturbance  Respiratory: Negative for cough, chest tightness, shortness of breath and wheezing  Cardiovascular: Negative for chest pain, palpitations and leg swelling  Gastrointestinal: Positive for diarrhea, nausea and vomiting  Negative for abdominal distention, abdominal pain and constipation  Genitourinary: Negative for difficulty urinating, dysuria and hematuria  Musculoskeletal: Negative for arthralgias, gait problem and myalgias  Skin: Negative for rash and wound  Neurological: Positive for dizziness and light-headedness  Negative for weakness, numbness and headaches  Psychiatric/Behavioral: Negative for confusion  The patient is not nervous/anxious  Past Medical and Surgical History:     Past Medical History:   Diagnosis Date    CKD (chronic kidney disease) stage 2, GFR 60-89 ml/min     Colon polyps     Dementia     Hyperlipidemia     Hypertension     Insomnia     Osteoporosis     Uterine fibroid        History reviewed  No pertinent surgical history  Meds/Allergies:    Prior to Admission medications    Medication Sig Start Date End Date Taking? Authorizing Provider   amLODIPine (NORVASC) 5 mg tablet Take 1 tablet (5 mg total) by mouth daily 12/3/18   DASIA Davies   benazepril (LOTENSIN) 5 mg tablet Take 5 mg by mouth daily    Historical Provider, MD   donepezil (ARICEPT) 5 mg tablet Take 1 tablet (5 mg total) by mouth daily at bedtime 12/3/18   DASIA Davies     I have reviewed home medications with patient personally  Allergies: Allergies   Allergen Reactions    Cefazolin      Other reaction(s): Unknown       Social History:     Marital Status:    Occupation:  Retired  Patient Pre-hospital Living Situation:  , home alone in a bilevel house  Patient Pre-hospital Level of Mobility:  Independent  Does have a walker and cane but does not use them  Continues to drive  Patient Pre-hospital Diet Restrictions:  Low sodium  Substance Use History:   History   Alcohol Use    4 2 oz/week    7 Glasses of wine per week     History   Smoking Status    Never Smoker   Smokeless Tobacco    Never Used     History   Drug Use No       Family History:    History reviewed  No pertinent family history      Physical Exam:     Vitals:   Blood Pressure: 165/76 (12/05/18 1500)  Pulse: 83 (12/05/18 1500)  Temperature: 97 7 °F (36 5 °C) (12/05/18 1225)  Temp Source: Oral (12/05/18 1225)  Respirations: 18 (12/05/18 1500)  Weight - Scale: 52 8 kg (116 lb 6 5 oz) (12/05/18 1221)  SpO2: 97 % (12/05/18 1500)    Physical Exam   Constitutional: She is oriented to person, place, and time  She appears well-developed  No distress  HENT:   Head: Normocephalic  Neck: Normal range of motion  Cardiovascular: Normal rate, regular rhythm and intact distal pulses  Murmur heard  Pulmonary/Chest: Effort normal  No accessory muscle usage  No tachypnea  No respiratory distress  She has decreased breath sounds in the right upper field, the right lower field, the left upper field and the left lower field  She has no wheezes  She has no rhonchi  She has no rales  Abdominal: Soft  Bowel sounds are normal  She exhibits no distension  There is no tenderness  Musculoskeletal: Normal range of motion  She exhibits no edema or tenderness  Neurological: She is alert and oriented to person, place, and time  Forgetful   Skin: Skin is warm and dry  She is not diaphoretic  Psychiatric: She has a normal mood and affect  Her behavior is normal    Nursing note and vitals reviewed  Additional Data:     Lab Results: I have personally reviewed pertinent reports          Results from last 7 days  Lab Units 12/05/18  1247   WBC Thousand/uL 7 37   HEMOGLOBIN g/dL 15 6*   HEMATOCRIT % 45 2   PLATELETS Thousands/uL 335   NEUTROS PCT % 86*   LYMPHS PCT % 10*   MONOS PCT % 3*   EOS PCT % 0       Results from last 7 days  Lab Units 12/05/18  1247   SODIUM mmol/L 140   POTASSIUM mmol/L 3 2*   CHLORIDE mmol/L 99*   CO2 mmol/L 26   BUN mg/dL 16   CREATININE mg/dL 0 98   ANION GAP mmol/L 15*   CALCIUM mg/dL 9 4   ALBUMIN g/dL 4 6   TOTAL BILIRUBIN mg/dL 1 90*   ALK PHOS U/L 77   ALT U/L 21   AST U/L 18   GLUCOSE RANDOM mg/dL 132       Results from last 7 days  Lab Units 12/05/18  1247   INR  0 89               Results from last 7 days  Lab Units 12/05/18  1247 LACTIC ACID mmol/L 1 4       Imaging: I have personally reviewed pertinent reports  CT chest abdomen pelvis w contrast   Final Result by Yeny Lawson MD (12/05 1500)      No acute consolidation      Incidentally detected 3 mm nodule in the left lower lung  Based on current Fleischner Society 2017 Guidelines on incidental pulmonary nodule, no routine follow-up is needed if the patient is considered low risk for lung cancer  If the patient is    considered high risk for lung cancer, 12 month follow-up non-contrast chest CT is recommended  Dilated ascending aorta measuring about 4 1 cm consider cardiac echo to evaluate for any evolving heart disease      No evidence of bowel obstruction   No acute inflammatory stranding   Small right inguinal hernia containing colonic loop without evidence of obstruction      Workstation performed: [id number]         CT head without contrast   Final Result by Yeny Lawson MD (12/05 1433)      No acute intracranial hemorrhage seen   Moderate periventricular and white matter hypodensity seen related to chronic small vessel ischemic                  Workstation performed: [id number]             EKG, Pathology, and Other Studies Reviewed on Admission:   · EKG:  Sinus rhythm with PVCs    Allscripts / Epic Records Reviewed: Yes     ** Please Note: This note has been constructed using a voice recognition system   **

## 2018-12-05 NOTE — ASSESSMENT & PLAN NOTE
CT chest abdomen pelvis:  Incidentally detected 3 mm nodule in the left lower lung  No routine follow-up is needed as patient is considered low risk for lung cancer    Outpatient follow-up with PCP

## 2018-12-05 NOTE — ED PROVIDER NOTES
History  Chief Complaint   Patient presents with    Vomiting     pt c/o nausea vomiting and diarrhea for the past few days     Patient is an 78-year-old female with past medical history of dementia, hypertension, chronic kidney disease, osteoporosis, presents to the emergency department for lightheadedness and dizziness as well as acute nausea, vomiting and diarrhea  Patient overall is a very poor historian, likely due to her history of dementia  She cannot give me clear onset of symptoms but it seems as though for the past several days she has been feeling lightheaded  She states it initially was coming and going but she has had persistent lightheadedness since Monday  She did see her family doctor on Monday who started her on a new blood pressure medication, amlodipine as well as Aricept for dementia  She reports last night she had cream of chicken soup and almost immediately after eating that she became sick and has had several episodes of nonbilious and nonbloody vomiting as well as nonbloody diarrhea  She states every time she has to throw up and since last night it is been between 5 and 10 episodes, she also has to move her bowels and it is either releasing gas or diarrhea  She denies significant pain in her abdomen but states she feels as though she has gas  Review of systems, she also reports having recent coughing and cold-like symptoms  She denies any known fever, shaking chills, significant headache, vertigo, tinnitus or loss of hearing, change in vision, hemoptysis, chest pain, palpitations, dyspnea, abdominal pain or distention, hematemesis, bright red blood per rectum, melena, dysuria, change in urinary frequency, hematuria, flank pain, skin rash or color change, lateralizing extremity weakness or paresthesia or other focal neurologic deficits  Denies any known sick contacts or recent travel outside the country          History provided by:  Patient  History limited by:  Dementia  Language  used: No    Vomiting   Associated symptoms: cough and diarrhea    Associated symptoms: no abdominal pain, no chills, no fever, no headaches and no sore throat        Prior to Admission Medications   Prescriptions Last Dose Informant Patient Reported? Taking? amLODIPine (NORVASC) 5 mg tablet   No No   Sig: Take 1 tablet (5 mg total) by mouth daily   benazepril (LOTENSIN) 5 mg tablet   Yes No   Sig: Take 5 mg by mouth daily   donepezil (ARICEPT) 5 mg tablet   No No   Sig: Take 1 tablet (5 mg total) by mouth daily at bedtime      Facility-Administered Medications: None       Past Medical History:   Diagnosis Date    CKD (chronic kidney disease) stage 2, GFR 60-89 ml/min     Colon polyps     Dementia     Hyperlipidemia     Hypertension     Insomnia     Osteoporosis     Uterine fibroid        History reviewed  No pertinent surgical history  History reviewed  No pertinent family history  I have reviewed and agree with the history as documented  Social History   Substance Use Topics    Smoking status: Never Smoker    Smokeless tobacco: Never Used    Alcohol use 4 2 oz/week     7 Glasses of wine per week        Review of Systems   Constitutional: Negative for chills, diaphoresis and fever  HENT: Negative for congestion, ear pain, hearing loss, rhinorrhea, sore throat and tinnitus  Eyes: Negative for pain and visual disturbance  Respiratory: Positive for cough  Negative for chest tightness, shortness of breath and wheezing  Cardiovascular: Negative for chest pain and palpitations  Gastrointestinal: Positive for diarrhea, nausea and vomiting  Negative for abdominal distention, abdominal pain, blood in stool and constipation  Genitourinary: Negative for dysuria, flank pain, frequency and hematuria  Musculoskeletal: Negative for back pain, neck pain and neck stiffness  Skin: Negative for color change, pallor and rash  Allergic/Immunologic: Negative for immunocompromised state  Neurological: Positive for dizziness and light-headedness  Negative for syncope, facial asymmetry, speech difficulty, weakness, numbness and headaches  Hematological: Negative for adenopathy  Psychiatric/Behavioral: Negative for confusion and decreased concentration  All other systems reviewed and are negative  Physical Exam  Physical Exam   Constitutional: She is oriented to person, place, and time  She appears well-developed  No distress  Patient very thin and frail appearing  HENT:   Head: Normocephalic and atraumatic  Right Ear: External ear normal    Left Ear: External ear normal    Mouth/Throat: Oropharynx is clear and moist  No oropharyngeal exudate  Eyes: Pupils are equal, round, and reactive to light  Conjunctivae and EOM are normal    Neck: Normal range of motion  Neck supple  No JVD present  Cardiovascular: Normal rate, regular rhythm, normal heart sounds and intact distal pulses  Exam reveals no gallop and no friction rub  No murmur heard  Pulmonary/Chest: Effort normal and breath sounds normal  No respiratory distress  She has no wheezes  She has no rales  Abdominal: Soft  Bowel sounds are normal  She exhibits no distension  There is no tenderness  There is no rebound and no guarding  Musculoskeletal: Normal range of motion  She exhibits no edema or tenderness  Lymphadenopathy:     She has no cervical adenopathy  Neurological: She is alert and oriented to person, place, and time  No cranial nerve deficit  No gross motor or sensory deficits  Skin: Skin is warm and dry  No rash noted  She is not diaphoretic  No erythema  No pallor  Psychiatric: She has a normal mood and affect  Her behavior is normal    Nursing note and vitals reviewed        Vital Signs  ED Triage Vitals   Temperature Pulse Respirations Blood Pressure SpO2   12/05/18 1225 12/05/18 1221 12/05/18 1221 12/05/18 1221 12/05/18 1221   97 7 °F (36 5 °C) 77 16 (!) 183/88 97 %      Temp Source Heart Rate Source Patient Position - Orthostatic VS BP Location FiO2 (%)   12/05/18 1225 12/05/18 1221 12/05/18 1221 12/05/18 1221 --   Oral Monitor Sitting Left arm       Pain Score       12/05/18 1221       No Pain           Vitals:    12/05/18 1446 12/05/18 1500 12/05/18 1630 12/05/18 1728   BP: 168/85 165/76 170/88 (!) 189/111   Pulse: 73 83 68    Patient Position - Orthostatic VS: Lying - Orthostatic VS Lying Lying        Visual Acuity      ED Medications  Medications   donepezil (ARICEPT) tablet 5 mg (not administered)   sodium chloride 0 9 % infusion (75 mL/hr Intravenous New Bag 12/5/18 1709)   ondansetron (ZOFRAN) injection 4 mg (not administered)   acetaminophen (TYLENOL) tablet 650 mg (not administered)   lisinopril (ZESTRIL) tablet 5 mg (5 mg Oral Given 12/5/18 1728)   enoxaparin (LOVENOX) subcutaneous injection 40 mg (not administered)   sodium chloride 0 9 % bolus 1,000 mL (0 mL Intravenous Stopped 12/5/18 1710)   ondansetron (ZOFRAN) injection 4 mg (4 mg Intravenous Given 12/5/18 1303)   potassium chloride (K-DUR,KLOR-CON) CR tablet 40 mEq (40 mEq Oral Given 12/5/18 1352)   iohexol (OMNIPAQUE) 350 MG/ML injection (MULTI-DOSE) 100 mL (100 mL Intravenous Given 12/5/18 1341)   potassium chloride (K-DUR,KLOR-CON) CR tablet 40 mEq (40 mEq Oral Given 12/5/18 1710)       Diagnostic Studies  Results Reviewed     Procedure Component Value Units Date/Time    TSH, 3rd generation [[id number]]  (Normal) Collected:  12/05/18 1247    Lab Status:  Final result Specimen:  Blood from Arm, Left Updated:  12/05/18 1721     TSH 3RD GENERATON 3 014 uIU/mL     Narrative:         Patients undergoing fluorescein dye angiography may retain small amounts of fluorescein in the body for 48-72 hours post procedure  Samples containing fluorescein can produce falsely depressed TSH values  If the patient had this procedure,a specimen should be resubmitted post fluorescein clearance            The recommended reference ranges for TSH during pregnancy are as follows:  First trimester 0 1 to 2 5 uIU/mL  Second trimester  0 2 to 3 0 uIU/mL  Third trimester 0 3 to 3 0 uIU/m      UA w Reflex to Microscopic [[id number]]  (Abnormal) Collected:  12/05/18 1444    Lab Status:  Final result Specimen:  Urine from Urine, Clean Catch Updated:  12/05/18 1451     Color, UA Light Yellow     Clarity, UA Clear     Specific Gravity, UA 1 010     pH, UA 7 5     Leukocytes, UA Negative     Nitrite, UA Negative     Protein, UA Negative mg/dl      Glucose, UA Negative mg/dl      Ketones, UA 15 (1+) (A) mg/dl      Urobilinogen, UA 0 2 E U /dl      Bilirubin, UA Negative     Blood, UA Negative    Rapid Influenza Screen with Reflex PCR [[id number]]  (Normal) Collected:  12/05/18 1306    Lab Status:  Final result Specimen:  Nasopharyngeal from Nasopharyngeal Swab Updated:  12/05/18 1335     Rapid Influenza A Ag Negative     Rapid Influenza B Ag Negative    Influenza A/B and RSV by PCR [[id number]] Collected:  12/05/18 1306    Lab Status: In process Specimen:  Nasopharyngeal from Nasopharyngeal Swab Updated:  12/05/18 1335    Lactic acid, plasma [[id number]]  (Normal) Collected:  12/05/18 1247    Lab Status:  Final result Specimen:  Blood from Arm, Left Updated:  12/05/18 1323     LACTIC ACID 1 4 mmol/L     Narrative:         Result may be elevated if tourniquet was used during collection      Basic metabolic panel [[id number]]  (Abnormal) Collected:  12/05/18 1247    Lab Status:  Final result Specimen:  Blood from Arm, Left Updated:  12/05/18 1320     Sodium 140 mmol/L      Potassium 3 2 (L) mmol/L      Chloride 99 (L) mmol/L      CO2 26 mmol/L      ANION GAP 15 (H) mmol/L      BUN 16 mg/dL      Creatinine 0 98 mg/dL      Glucose 132 mg/dL      Calcium 9 4 mg/dL      eGFR 55 ml/min/1 73sq m     Narrative:         National Kidney Disease Education Program recommendations are as follows:  GFR calculation is accurate only with a steady state creatinine  Chronic Kidney disease less than 60 ml/min/1 73 sq  meters  Kidney failure less than 15 ml/min/1 73 sq  meters      Hepatic function panel [[id number]]  (Abnormal) Collected:  12/05/18 1247    Lab Status:  Final result Specimen:  Blood from Arm, Left Updated:  12/05/18 1320     Total Bilirubin 1 90 (H) mg/dL      Bilirubin, Direct 0 33 (H) mg/dL      Alkaline Phosphatase 77 U/L      AST 18 U/L      ALT 21 U/L      Total Protein 8 3 (H) g/dL      Albumin 4 6 g/dL     Magnesium [[id number]]  (Normal) Collected:  12/05/18 1247    Lab Status:  Final result Specimen:  Blood from Arm, Left Updated:  12/05/18 1320     Magnesium 1 9 mg/dL     Lipase [[id number]]  (Normal) Collected:  12/05/18 1247    Lab Status:  Final result Specimen:  Blood from Arm, Left Updated:  12/05/18 1320     Lipase 125 u/L     Troponin I [[id number]]  (Normal) Collected:  12/05/18 1247    Lab Status:  Final result Specimen:  Blood from Arm, Left Updated:  12/05/18 1319     Troponin I <0 02 ng/mL     Protime-INR [[id number]]  (Normal) Collected:  12/05/18 1247    Lab Status:  Final result Specimen:  Blood from Arm, Left Updated:  12/05/18 1318     Protime 12 0 seconds      INR 0 89    APTT [[id number]]  (Abnormal) Collected:  12/05/18 1247    Lab Status:  Final result Specimen:  Blood from Arm, Left Updated:  12/05/18 1318     PTT 25 (L) seconds     CBC and differential [[id number]]  (Abnormal) Collected:  12/05/18 1247    Lab Status:  Final result Specimen:  Blood from Arm, Left Updated:  12/05/18 1256     WBC 7 37 Thousand/uL      RBC 4 86 Million/uL      Hemoglobin 15 6 (H) g/dL      Hematocrit 45 2 %      MCV 93 fL      MCH 32 1 pg      MCHC 34 5 g/dL      RDW 12 0 %      MPV 9 8 fL      Platelets 122 Thousands/uL      nRBC 0 /100 WBCs      Neutrophils Relative 86 (H) %      Immat GRANS % 0 %      Lymphocytes Relative 10 (L) %      Monocytes Relative 3 (L) %      Eosinophils Relative 0 %      Basophils Relative 1 %      Neutrophils Absolute 6 31 Thousands/µL      Immature Grans Absolute 0 02 Thousand/uL      Lymphocytes Absolute 0 75 Thousands/µL      Monocytes Absolute 0 20 Thousand/µL      Eosinophils Absolute 0 01 Thousand/µL      Basophils Absolute 0 08 Thousands/µL     Blood culture #1 [[id number]]     Lab Status:  No result Specimen:  Blood     Blood culture #2 [[id number]]     Lab Status:  No result Specimen:  Blood                  CT chest abdomen pelvis w contrast   Final Result by Nilo West MD (12/05 1500)      No acute consolidation      Incidentally detected 3 mm nodule in the left lower lung  Based on current Fleischner Society 2017 Guidelines on incidental pulmonary nodule, no routine follow-up is needed if the patient is considered low risk for lung cancer  If the patient is    considered high risk for lung cancer, 12 month follow-up non-contrast chest CT is recommended        Dilated ascending aorta measuring about 4 1 cm consider cardiac echo to evaluate for any evolving heart disease      No evidence of bowel obstruction   No acute inflammatory stranding   Small right inguinal hernia containing colonic loop without evidence of obstruction      Workstation performed: [id number]         CT head without contrast   Final Result by Nilo West MD (12/05 1433)      No acute intracranial hemorrhage seen   Moderate periventricular and white matter hypodensity seen related to chronic small vessel ischemic                  Workstation performed: [id number]                    Procedures  ECG 12 Lead Documentation  Date/Time: 12/5/2018 12:51 PM  Performed by: Isaiah Shi by: Urban Morning     ECG reviewed by me, the ED Provider: yes    Patient location:  ED  Previous ECG:     Previous ECG:  Unavailable  Rate:     ECG rate:  76    ECG rate assessment: normal    Rhythm:     Rhythm: sinus rhythm    Ectopy:     Ectopy: PVCs      PVCs:  Frequent  QRS:     QRS axis:  Normal    QRS intervals:  Normal  Conduction:     Conduction: normal    ST segments:     ST segments:  Normal  T waves:     T waves: normal             Phone Contacts  ED Phone Contact    ED Course  ED Course as of Dec 05 1913   Wed Dec 05, 2018   1319 Troponin I: <0 02   1319 WBC: 7 37   1323 LACTIC ACID: 1 4   1324 Will replace with oral potassium  Potassium: (!) 3 2   1503 Leukocytes, UA: Negative   1503 Nitrite, UA: Negative   1504 Updated patient about workup thus far  Recommended observation admission to further hydrate and have PT evaluate patient  She lives alone and given the near-syncope, she is at risk of falling and possibly passing out without anyone available to help  Patient is agreeable  [phone] Patient not a smoker and never was  She reports her  was never smoker either so given the incidental pulmonary nodule, she could follow up with PCP but was likely not need reimaging  MDM  Number of Diagnoses or Management Options  Diagnosis management comments: 57-year-old female presents with lightheadedness/near syncope as well as acute nausea, vomiting and diarrhea  She states the dizziness started before her acute GI symptoms which started last night  The lightheadedness could be secondary to being started on new medications  Differential is vast however includes acute stroke, electrolyte or metabolic abnormality, worsening renal function, dehydration, bowel obstruction, diverticulitis, nonspecific enteritis or colitis, pneumonia  Will do full cardiac, septic and abdominal workup and obtain CT scan of the head, chest, abdomen and pelvis  Will hydrate with 1 L IV fluids and provide Zofran for nausea  She did denies any pain currently         Amount and/or Complexity of Data Reviewed  Clinical lab tests: ordered and reviewed  Tests in the radiology section of CPT®: ordered and reviewed  Tests in the medicine section of CPT®: ordered and reviewed  Independent visualization of images, tracings, or specimens: yes      CritCare Time    Disposition  Final diagnoses:   Lightheadedness   Near syncope   Nausea, vomiting, and diarrhea   Ascending aortic aneurysm (HCC)   Pulmonary nodule     Time reflects when diagnosis was documented in both MDM as applicable and the Disposition within this note     Time User Action Codes Description Comment    12/5/2018  3:13 PM Barbaraann [place] E Add [R42] Lightheadedness     12/5/2018  3:13 PM Barbaraann [place] E Add [R55] Near syncope     12/5/2018  3:13 PM Barbaraann [place] E Add [R11 2,  R19 7] Nausea, vomiting, and diarrhea     12/5/2018  3:14 PM Barbaraann [place] E Add [I71 2] Ascending aortic aneurysm ([address] )     12/5/2018  3:14 PM Barbaraann [place] E Add [R91 1] Pulmonary nodule       ED Disposition     ED Disposition Condition Comment    Admit  Case was discussed with [facility] and the patient's admission status was agreed to be Admission Status: observation status to the service of Dr Deisy Rangel          Follow-up Information    None         Current Discharge Medication List      CONTINUE these medications which have NOT CHANGED    Details   amLODIPine (NORVASC) 5 mg tablet Take 1 tablet (5 mg total) by mouth daily  Qty: 30 tablet, Refills: 0    Associated Diagnoses: Benign hypertension      benazepril (LOTENSIN) 5 mg tablet Take 5 mg by mouth daily      donepezil (ARICEPT) 5 mg tablet Take 1 tablet (5 mg total) by mouth daily at bedtime  Qty: 30 tablet, Refills: 0    Associated Diagnoses: Dementia without behavioral disturbance, unspecified dementia type           No discharge procedures on file      ED Provider  Electronically Signed by           Rl Cates DO  12/05/18 8588

## 2018-12-05 NOTE — ASSESSMENT & PLAN NOTE
Incidental finding on CT chest abdomen pelvis  CT revealed a dilated ascending aorta measuring 4 1 cm     · Follow-up echocardiogram  · Blood pressure control

## 2018-12-06 ENCOUNTER — TELEPHONE (OUTPATIENT)
Dept: FAMILY MEDICINE CLINIC | Facility: CLINIC | Age: 78
End: 2018-12-06

## 2018-12-06 VITALS
OXYGEN SATURATION: 96 % | DIASTOLIC BLOOD PRESSURE: 60 MMHG | TEMPERATURE: 97.7 F | WEIGHT: 116.4 LBS | RESPIRATION RATE: 18 BRPM | BODY MASS INDEX: 21.98 KG/M2 | HEART RATE: 59 BPM | HEIGHT: 61 IN | SYSTOLIC BLOOD PRESSURE: 118 MMHG

## 2018-12-06 LAB
ANION GAP SERPL CALCULATED.3IONS-SCNC: 9 MMOL/L (ref 4–13)
BUN SERPL-MCNC: 16 MG/DL (ref 5–25)
CALCIUM SERPL-MCNC: 8.7 MG/DL (ref 8.3–10.1)
CHLORIDE SERPL-SCNC: 107 MMOL/L (ref 100–108)
CO2 SERPL-SCNC: 24 MMOL/L (ref 21–32)
CREAT SERPL-MCNC: 1.12 MG/DL (ref 0.6–1.3)
ERYTHROCYTE [DISTWIDTH] IN BLOOD BY AUTOMATED COUNT: 12.3 % (ref 11.6–15.1)
FLUAV AG SPEC QL: NORMAL
FLUBV AG SPEC QL: NORMAL
GFR SERPL CREATININE-BSD FRML MDRD: 47 ML/MIN/1.73SQ M
GLUCOSE P FAST SERPL-MCNC: 107 MG/DL (ref 65–99)
GLUCOSE SERPL-MCNC: 107 MG/DL (ref 65–140)
HCT VFR BLD AUTO: 38.7 % (ref 34.8–46.1)
HGB BLD-MCNC: 13.3 G/DL (ref 11.5–15.4)
MAGNESIUM SERPL-MCNC: 2 MG/DL (ref 1.6–2.6)
MCH RBC QN AUTO: 32.6 PG (ref 26.8–34.3)
MCHC RBC AUTO-ENTMCNC: 34.4 G/DL (ref 31.4–37.4)
MCV RBC AUTO: 95 FL (ref 82–98)
PHOSPHATE SERPL-MCNC: 2.9 MG/DL (ref 2.3–4.1)
PLATELET # BLD AUTO: 297 THOUSANDS/UL (ref 149–390)
PMV BLD AUTO: 9.5 FL (ref 8.9–12.7)
POTASSIUM SERPL-SCNC: 5 MMOL/L (ref 3.5–5.3)
RBC # BLD AUTO: 4.08 MILLION/UL (ref 3.81–5.12)
RSV B RNA SPEC QL NAA+PROBE: NORMAL
SODIUM SERPL-SCNC: 140 MMOL/L (ref 136–145)
WBC # BLD AUTO: 6.49 THOUSAND/UL (ref 4.31–10.16)

## 2018-12-06 PROCEDURE — 84100 ASSAY OF PHOSPHORUS: CPT | Performed by: NURSE PRACTITIONER

## 2018-12-06 PROCEDURE — 93306 TTE W/DOPPLER COMPLETE: CPT | Performed by: INTERNAL MEDICINE

## 2018-12-06 PROCEDURE — G8979 MOBILITY GOAL STATUS: HCPCS

## 2018-12-06 PROCEDURE — G8988 SELF CARE GOAL STATUS: HCPCS

## 2018-12-06 PROCEDURE — G8978 MOBILITY CURRENT STATUS: HCPCS

## 2018-12-06 PROCEDURE — 85027 COMPLETE CBC AUTOMATED: CPT | Performed by: NURSE PRACTITIONER

## 2018-12-06 PROCEDURE — 97167 OT EVAL HIGH COMPLEX 60 MIN: CPT

## 2018-12-06 PROCEDURE — 99217 PR OBSERVATION CARE DISCHARGE MANAGEMENT: CPT | Performed by: INTERNAL MEDICINE

## 2018-12-06 PROCEDURE — 97163 PT EVAL HIGH COMPLEX 45 MIN: CPT

## 2018-12-06 PROCEDURE — G8987 SELF CARE CURRENT STATUS: HCPCS

## 2018-12-06 PROCEDURE — 83735 ASSAY OF MAGNESIUM: CPT | Performed by: NURSE PRACTITIONER

## 2018-12-06 PROCEDURE — 80048 BASIC METABOLIC PNL TOTAL CA: CPT | Performed by: NURSE PRACTITIONER

## 2018-12-06 RX ORDER — LISINOPRIL 5 MG/1
5 TABLET ORAL DAILY
Qty: 30 TABLET | Refills: 2 | Status: SHIPPED | OUTPATIENT
Start: 2018-12-07 | End: 2019-01-28 | Stop reason: SDUPTHER

## 2018-12-06 RX ADMIN — ENOXAPARIN SODIUM 40 MG: 40 INJECTION SUBCUTANEOUS at 09:02

## 2018-12-06 RX ADMIN — LISINOPRIL 5 MG: 5 TABLET ORAL at 09:02

## 2018-12-06 RX ADMIN — SODIUM CHLORIDE 75 ML/HR: 0.9 INJECTION, SOLUTION INTRAVENOUS at 05:30

## 2018-12-06 NOTE — TELEPHONE ENCOUNTER
Pt was recently in hospital overnight  With lightheadedness - the hospitalist changed her BP med to Lisinopril 5 mg - she was told to make an f/u , but there are no openings  with you when her daughter can bring her (only would like to see you )  She is asking if she can just keep her Jan 3rd appt    Instead - she is feeling ok     Call daughter back @ [phone]

## 2018-12-06 NOTE — RESPIRATORY THERAPY NOTE
RT Protocol Note  Dolph Charbel [de-identified] y o  female MRN: [id number]  Unit/Bed#: -01 Encounter: [id number]    Assessment    Principal Problem:    Nausea & vomiting  Active Problems:    Benign hypertension    Dementia without behavioral disturbance    Dizziness    Nodule of left lung    Ascending aortic aneurysm (HCC)      Home Pulmonary Medications:  n/a       Past Medical History:   Diagnosis Date    CKD (chronic kidney disease) stage 2, GFR 60-89 ml/min     Colon polyps     Dementia     Hyperlipidemia     Hypertension     Insomnia     Osteoporosis     Uterine fibroid      Social History     Social History    Marital status:      Spouse name: N/A    Number of children: N/A    Years of education: N/A     Social History Main Topics    Smoking status: Never Smoker    Smokeless tobacco: Never Used    Alcohol use 4 2 oz/week     7 Glasses of wine per week    Drug use: No    Sexual activity: No     Other Topics Concern    None     Social History Narrative    None       Subjective         Objective    Physical Exam:   Assessment Type: Assess only  General Appearance: Alert, Awake  Respiratory Pattern: Normal, Spontaneous, Shallow  Chest Assessment: Chest expansion symmetrical  Bilateral Breath Sounds: Clear, Diminished  Cough: None  O2 Device: room air    Vitals:  Blood pressure (!) 174/85, pulse 63, temperature 97 9 °F (36 6 °C), temperature source Oral, resp  rate 16, height 5' 1" (1 549 m), weight 52 8 kg (116 lb 6 5 oz), SpO2 96 %  Imaging and other studies: I have personally reviewed pertinent reports  O2 Device: room air     Plan    Respiratory Plan: No distress/Pulmonary history        Resp Comments: respiratory protocol completed at this time pateint awake and alert without apparent respiratory distress no indication for aerosol therapy needed no PMH of pulmonary issues protocol will be discontinued per protocol

## 2018-12-06 NOTE — UTILIZATION REVIEW
Initial Clinical Review    Admission: Date/Time/Statement: OBS   12/5  1514    Orders Placed This Encounter   Procedures    Place in Observation (expected length of stay for this patient is less than two midnights)     Standing Status:   Standing     Number of Occurrences:   1     Order Specific Question:   Admitting Physician     Answer:   Antonia Ferraro [[id number]]     Order Specific Question:   Level of Care     Answer:   Med Surg [16]         ED: Date/Time/Mode of Arrival:   ED Arrival Information     Expected Arrival Acuity Means of Arrival Escorted By Service Admission Type    - 12/5/2018 12:14 Urgent Ambulance [facility]) General Medicine Urgent    Arrival Complaint    DIZZINESS          Chief Complaint:   Chief Complaint   Patient presents with    Vomiting     pt c/o nausea vomiting and diarrhea for the past few days       History of Illness: [de-identified] yo female admitted w/ several days of lightheadedness and dizziness assoc w/ acute onset of N/V/D   Diarrhea x1  Has not taken BP med in 3 months       PE : + murmur , DEC BS charly , forgetful     ED Vital Signs:   ED Triage Vitals   Temperature Pulse Respirations Blood Pressure SpO2   12/05/18 1225 12/05/18 1221 12/05/18 1221 12/05/18 1221 12/05/18 1221   97 7 °F (36 5 °C) 77 16 (!) 183/88 97 %      Temp Source Heart Rate Source Patient Position - Orthostatic VS BP Location FiO2 (%)   12/05/18 1225 12/05/18 1221 12/05/18 1221 12/05/18 1221 --   Oral Monitor Sitting Left arm       Pain Score       12/05/18 1221       No Pain        Wt Readings from Last 1 Encounters:   12/05/18 52 8 kg (116 lb 6 5 oz)       Vital Signs (abnormal):  12/05/18 1728  --  --  --   189/111  --  --  --  --   12/05/18 1630  --  68  18  170/88  --  97 %  None (Room air)  Lying   12/05/18 1500  --  83  18  165/76  --  97 %  None (Room air)  Lying   12/05/18 1446  --  73  16  168/85  --  --  --  Lying - Orthostatic VS   12/05/18 1443  --  77  16  169/95  --  --  --  Standing - Orthostatic VS 12/05/18 1442  --  77  16   171/111  --  --  --       Abnormal Labs/Diagnostic Test Results: K  3 2 , cl 99, an gap  15, total bili   1 90, direct bili   0 33, total prot   8 3, ptt 25, H&H  15 6  45 2  CT head- wnl   CT chest - no acute changes   EKG- NSR w/ PVC     ED Treatment:   Medication Administration from 12/05/2018 1214 to 12/05/2018 1847       Date/Time Order Dose Route Action Action by Comments     12/05/2018 1710 sodium chloride 0 9 % bolus 1,000 mL 0 mL Intravenous Stopped Garrett Lozada RN      12/05/2018 1304 sodium chloride 0 9 % bolus 1,000 mL 1,000 mL Intravenous [address] Garrett Lozada RN      12/05/2018 1303 ondansetron (ZOFRAN) injection 4 mg 4 mg Intravenous Given Garrett Lozada RN      12/05/2018 1352 potassium chloride (K-DUR,KLOR-CON) CR tablet 40 mEq 40 mEq Oral Given Garrett Lzoada RN      12/05/2018 1341 iohexol (OMNIPAQUE) 350 MG/ML injection (MULTI-DOSE) 100 mL 100 mL Intravenous Given Donyeze Amaya      12/05/2018 1709 sodium chloride 0 9 % infusion 75 mL/hr Intravenous [address] Garrett Lozada RN      12/05/2018 1728 lisinopril (ZESTRIL) tablet 5 mg 5 mg Oral Given Garrett Lozada RN      12/05/2018 1710 potassium chloride (K-DUR,KLOR-CON) CR tablet 40 mEq 40 mEq Oral Given Garrett Lozada RN           Past Medical/Surgical History:    Active Ambulatory Problems     Diagnosis Date Noted    Benign hypertension 12/03/2018    Dementia without behavioral disturbance 12/03/2018     Past Medical History:   Diagnosis Date    CKD (chronic kidney disease) stage 2, GFR 60-89 ml/min     Colon polyps     Dementia     Hyperlipidemia     Hypertension     Insomnia     Osteoporosis     Uterine fibroid        Admitting Diagnosis: Lightheadedness [R42]  Vomiting [R11 10]  Pulmonary nodule [R91 1]  Ascending aortic aneurysm (HCC) [I71 2]  Near syncope [R55]  Nausea, vomiting, and diarrhea [R11 2, R19 7]    Age/Sex: [de-identified] y o  female    Assessment/Plan:   Dizziness   Assessment & Plan     Likely due to dehydration from viral gastroenteritis with nausea vomiting  · Check orthostatic blood pressures every shift  · Start IV hydration  · Consider need for Luis Alberto stockings  · Monitor blood pressure closely      Nausea & vomiting   Assessment & Plan     Likely due to viral gastroenteritis  · Start IV hydration and IV Zofran as needed      Ascending aortic aneurysm [facility])   Assessment & Plan     Incidental finding on CT chest abdomen pelvis  CT revealed a dilated ascending aorta measuring 4 1 cm  · Follow-up echocardiogram  · Blood pressure control      Benign hypertension   Assessment & Plan     Blood pressure elevated on arrival at 183/88  Now 165/76  Incidentally found an ascending aortic aneurysm at 4 1 cm  · Ideally need to lower blood pressure in light of aortic aneurysm  · Follow-up echocardiogram  · Continue ACEi  · Hold Norvasc       Nodule of left lung   Assessment & Plan     CT chest abdomen pelvis:  Incidentally detected 3 mm nodule in the left lower lung  No routine follow-up is needed as patient is considered low risk for lung cancer  Outpatient follow-up with PCP      Dementia without behavioral disturbance   Assessment & Plan     · Continue newly started Aricept 5 mg at HS by PCP on 12/3/2018  · PT/OT eval and treat for discharge recommendations as patient lives home alone         VTE Prophylaxis: Enoxaparin (Lovenox)  / sequential compression device   Code Status:  Full code level 1  POLST: POLST form is not discussed and not completed at this time  Discussion with family:  Daughter at bedside     Anticipated Length of Stay:  Patient will be admitted on an Observation basis with an anticipated length of stay of  Less than 2 midnights     Justification for Hospital Stay: dehydration, hypokalemia, vomiting, diarrhea      Admission Orders:  Scheduled Meds:   Current Facility-Administered Medications:  acetaminophen 650 mg Oral Q6H PRN     donepezil 5 mg Oral HS     enoxaparin 40 mg Subcutaneous Daily lisinopril 5 mg Oral Daily     ondansetron 4 mg Intravenous Q6H PRN     sodium chloride 75 mL/hr Intravenous Continuous  Last Rate: 75 mL/hr (12/06/18 0530)     Cardiovascular diet  OT PT eval   Orthostatic BP   Tele   12/6 cbc , phos , mg , bmp   Gluc  107

## 2018-12-06 NOTE — PLAN OF CARE
Problem: PHYSICAL THERAPY ADULT  Goal: Performs mobility at highest level of function for planned discharge setting  See evaluation for individualized goals  Treatment/Interventions: Functional transfer training, LE strengthening/ROM, Elevations, Therapeutic exercise, Endurance training, Patient/family training, Equipment eval/education, Bed mobility, Gait training, Spoke to nursing, OT  Equipment Recommended:  (TBD)       See flowsheet documentation for full assessment, interventions and recommendations  Prognosis: Good  Problem List: Decreased strength, Decreased endurance, Impaired balance, Decreased mobility, Decreased safety awareness, Decreased cognition, Impaired judgement  Assessment: Pt is [de-identified] y o  female seen for PT evaluation on 12/6/2018 s/p admit to [facility] on 12/5/2018 w/ Nausea & vomiting  P tpresents with acute N/V/D  PT consulted to assess pt's functional mobility and d/c needs  Order placed for PT eval and tx, w/ up w/ A order  Performed at least 2 patient identifiers during session: Name and wristband  Comorbidities affecting pt's physical performance at time of assessment include: dementia, hypertension, chronic kidney disease, osteoporosis, insomnia, HLD  PTA, pt was independent w/ all functional mobility w/ no AD/DME, ambulates unrestricted distances and all terrain, has 7 BARRON, lives alone in 2 level home and retired  Personal factors affecting pt at time of IE include: stairs to enter home, inability to navigate community distances, decreased cognition, limited home support, positive fall history and decreased initiation and engagement   Please find objective findings from PT assessment regarding body systems outlined above with impairments and limitations including weakness, impaired balance, decreased endurance, gait deviations, decreased activity tolerance, fall risk and decreased cognition, as well as mobility assessment (need for SBA assist w/ all phases of mobility when usually ambulating independently and need for cueing for mobility technique)  The following objective measures performed on IE also reveal limitations: Barthel Index: 55/100 and Modified [place]: 4 (moderate/severe disability)  Pt's clinical presentation is currently unstable/unpredictable seen in pt's presentation of need for input for task focus and mobility technique and ongoing medical assessment  Pt to benefit from continued PT tx to address deficits as defined above and maximize level of functional independent mobility and consistency  From PT/mobility standpoint, recommendation at time of d/c would be Home PT with family support vs STR pending progress in order to facilitate return to PLOF 7  Barriers to Discharge: Inaccessible home environment, Decreased caregiver support     Recommendation: Home PT, Home with family support (vs STR, with family support)     PT - OK to Discharge: No    See flowsheet documentation for full assessment

## 2018-12-06 NOTE — DISCHARGE SUMMARY
Discharge Summary - [facility] Internal Medicine    Patient Information: Todd Menard [de-identified] y o  female MRN: [id number]  Unit/Bed#: -01 Encounter: [id number]    Discharging Physician / Practitioner: Wade Vazquez MD  PCP: Reagan Valadez, [address] [place] [address]  Admission Date: 12/5/2018  Discharge Date: 12/06/18    Reason for Admission:  Nausea and vomiting/diarrhea    Discharge Diagnoses:     Principal Problem:    Nausea & vomiting  Active Problems:    Benign hypertension    Dementia without behavioral disturbance    Dizziness    Nodule of left lung    Ascending aortic aneurysm ([address] )  Resolved Problems:    * No resolved hospital problems  *    Present on Admission:   Nausea & vomiting   Dizziness   Benign hypertension   Dementia without behavioral disturbance   Nodule of left lung   Ascending aortic aneurysm [facility])    Consultations During Hospital Stay:  · None    Procedures Performed:     · CT scan which showed aortic aneurysm and echocardiogram showed normal ejection fraction and dilated aortic root  Significant Findings:     · As above    Incidental Findings:   · As above     Test Results Pending at Discharge (will require follow up): · None     Outpatient Tests Requested:  · None    Complications:  None    Hospital Course:     Todd Menard is a [de-identified] y o  female patient who originally presented to the hospital on 12/5/2018 due to having some GI symptoms including nausea, vomiting, and diarrhea  Also not feeling well  Her blood pressure was significantly elevated  Previously, daughter had mentioned that patient had her Norvasc stopped and she was put on lisinopril  However, it appeared to be the other way around that she was prescribed Norvasc which basically patient has not even started as she was having nausea and vomiting  She used to be on Lotensin 5 mg-daily, however, this was not refilled since September  She was started on lisinopril and her blood pressure has significantly improved    Potassium was also repleted and actually potassium slightly on the higher side this morning  Her symptoms have resolved  She feels good  She has also been recently started on Aricept which she would continue  Had lengthy discussion with patient and her stepdaughter  I have given a prescription for lisinopril 5 mg to take daily and follow up with her primary care physician to see if she would need additional medications/Norvasc which was just started although she has not really started taking it at all  I is doing that she was on Lotensin previously and her blood pressure was pretty good  As she was feeling dizzy and lightheaded which could be secondary to her GI symptoms, however, it could be also secondary to elevated blood pressures  Condition at Discharge: good     Discharge Day Visit / Exam:     Subjective:  Patient feels good today  She is eager to be discharged home  No more nausea, vomiting, diarrhea  She is also not dizzy or lightheaded  Vitals: Blood Pressure: 118/60 (12/06/18 0657)  Pulse: 59 (12/06/18 0657)  Temperature: 97 7 °F (36 5 °C) (12/06/18 0657)  Temp Source: Oral (12/06/18 0657)  Respirations: 18 (12/06/18 0657)  Height: 5' 1" (154 9 cm) (12/05/18 1900)  Weight - Scale: 52 8 kg (116 lb 6 5 oz) (12/05/18 1900)  SpO2: 96 % (12/06/18 0657)  Exam:        Vital signs are reviewed as above  Patient is up in the couch  Cheerful  Well hydrated  Chest clear to auscultation  S1 and S2 audible  Awake and alert  She knows where she is  She has some mild dementia  Abdomen is soft  It is nontender  Bowel sounds are audible  Has chronic arthritic joints          Discharge instructions/Information to patient and family:   See after visit summary for information provided to patient and family  Provisions for Follow-Up Care:  See after visit summary for information related to follow-up care and any pertinent home health orders        Disposition:     Home    For Discharges to [facility]:   · Not Applicable to this Patient - Not Applicable to this Patient    Planned Readmission:  None     Discharge Statement:  I spent 30+ minutes discharging the patient  This time was spent on the day of discharge  I had direct contact with the patient on the day of discharge  Greater than 50% of the total time was spent examining patient, answering all patient questions, arranging and discussing plan of care with patient as well as directly providing post-discharge instructions  Additional time then spent on discharge activities  Discharge Medications:  See after visit summary for reconciled discharge medications provided to patient and family  ** Please Note: Dragon 360 Dictation voice to text software may have been used in the creation of this document   **

## 2018-12-06 NOTE — PLAN OF CARE
Problem: OCCUPATIONAL THERAPY ADULT  Goal: Performs self-care activities at highest level of function for planned discharge setting  See evaluation for individualized goals  Treatment Interventions: ADL retraining, Functional transfer training, UE strengthening/ROM, Endurance training, Cognitive reorientation, Patient/family training, Equipment evaluation/education, Compensatory technique education, Continued evaluation, Cardiac education, Energy conservation, Activityengagement          See flowsheet documentation for full assessment, interventions and recommendations  Limitation: Decreased ADL status, Decreased Safe judgement during ADL, Decreased cognition, Decreased endurance, Decreased self-care trans, Decreased high-level ADLs  Prognosis: Good  Assessment: Pt is a 74 y o  female seen for OT evaluation s/p admit to [facility] on 12/5/2018 w/ Exertional dyspnea  Comorbidities affecting pt's functional performance at time of assessment include:anxiety, anemia, CAD, COPD, CKD, depression, HTN and PVD   Orders placed for OT evaluation and treatment and "activity as tolerated" order  Performed at least two patient identifiers during session including name and wristband  Personal factors affecting pt at time of IE include:steps to enter environment, behavioral pattern, difficulty performing ADLS, difficulty performing IADLS , compliance, decreased initiation and engagement , financial barriers, health management  and environment  Prior to admission, pt reports MI with ADLs, MI with IADLs, and (+) driving  Upon evaluation: Pt requires S with UB ADLs, S with LB ADLs, S with xfers and S with functional mobility 2* the following deficits impacting occupational performance: weakness, decreased strength, decreased balance, decreased tolerance, impaired problem solving, decreased safety awareness, impaired interpersonal skills, decreased coping skills, decreased mobilty and environmental deficits   Pt to benefit from continued skilled OT tx while in the hospital to address deficits as defined above and maximize level of functional independence w ADL's and functional mobility  Occupational Performance areas to address include: bathing/shower, toilet hygiene, dressing, medication management, socialization, health maintenance, community mobility, clothing management, money management, household maintenance and social participation  From OT standpoint, recommendation at time of d/c would be home OT         OT Discharge Recommendation: Home OT (with S )  OT - OK to Discharge: No

## 2018-12-06 NOTE — PHYSICAL THERAPY NOTE
Physical Therapy Evaluation     Patient's Name: Polo Mcneal    Admitting Diagnosis  Lightheadedness [R42]  Vomiting [R11 10]  Pulmonary nodule [R91 1]  Ascending aortic aneurysm ([address] ) [I71 2]  Near syncope [R55]  Nausea, vomiting, and diarrhea [R11 2, R19 7]    Problem List  Patient Active Problem List   Diagnosis    Benign hypertension    Dementia without behavioral disturbance    Nausea & vomiting    Dizziness    Nodule of left lung    Ascending aortic aneurysm [facility])       Past Medical History  Past Medical History:   Diagnosis Date    Aortic aneurysm ([address] )     4 1 cm on December 6, 2018    CKD (chronic kidney disease) stage 2, GFR 60-89 ml/min     Colon polyps     Dementia     Hyperlipidemia     Hypertension     Insomnia     Osteoporosis     Uterine fibroid        Past Surgical History  History reviewed  No pertinent surgical history  12/06/18 0945   Note Type   Note type Eval/Treat   Pain Assessment   Pain Assessment No/denies pain   Pain Score No Pain   Home Living   Type of [address] [place] [address] Two level;Stairs to enter with rails;Bed/bath upstairs; Performs ADLs on one level;1/2 bath on main level  (7 BARRON)   Bathroom Shower/Tub Walk-in shower   Bathroom Toilet Standard   Bathroom Equipment Grab bars in shower;Built-in shower seat   Bathroom Accessibility Accessible   Home Equipment Walker;Cane  (none used at baseline)   Additional Comments "I'm very active"   Prior Function   Level of [place] Independent with ADLs and functional mobility  ((+) )   Lives With Alone   Receives Help From Family   ADL Assistance Independent   IADLs Independent   Falls in the last 6 months 0   Vocational Retired  ()   Comments pt reports she goes to gym, is easing herself back into activity following clearance from her MD   Restrictions/Precautions   Weight Bearing Precautions Per Order No   Braces or Orthoses (none per pt)   Other Precautions Cognitive; Fall Risk;Bed Alarm;Chair Alarm;Multiple lines   General   Family/Caregiver Present No   Cognition   Overall Cognitive Status Impaired  (memory deficts, mildly confused, perseverations)   Arousal/Participation Alert   Attention Attends with cues to redirect   Orientation Level Oriented X4   Memory Decreased recall of recent events;Decreased short term memory  (takes awhile to recall with minimal prompting)   Following Commands Follows multistep commands with increased time or repetition   Comments pt agreeable to PT evaluation   RUE Assessment   RUE Assessment WFL   LUE Assessment   LUE Assessment WFL   RLE Assessment   RLE Assessment WFL  (grossly 3+/5)   LLE Assessment   LLE Assessment WFL  (grossly 3+/5)   Coordination   Movements are Fluid and Coordinated 1   Sensation WFL   Light Touch   RLE Light Touch Grossly intact   LLE Light Touch Grossly intact   Bed Mobility   Additional Comments pt received OOB In recliner upon arrival   Transfers   Sit to Stand 5  Supervision   Additional items Assist x 1; Armrests; Increased time required; Impulsive;Verbal cues   Stand to Sit 5  Supervision   Additional items Assist x 1; Armrests; Increased time required; Impulsive;Verbal cues   Ambulation/Elevation   Gait pattern Decreased foot clearance; Inconsistent antonio   Gait Assistance 4  Minimal assist  (CGA)   Additional items Assist x 1;Verbal cues   Assistive Device None   Distance 20' x2   Stair Management Assistance Not tested   Balance   Static Sitting Good   Dynamic Sitting Good   Static Standing Fair +   Dynamic Standing Fair   Ambulatory Fair   Endurance Deficit   Endurance Deficit Yes   Activity Tolerance   Activity Tolerance Patient tolerated treatment well   Medical Staff Made Aware OT [facility]   Nurse Made Aware CHAVA Lerner verbalized pt appropriate to see, made aware of session outcome/recs   Assessment   Prognosis Good   Problem List Decreased strength;Decreased endurance; Impaired balance;Decreased mobility; Decreased safety awareness;Decreased cognition; Impaired judgement   Assessment Pt is [de-identified] y o  female seen for PT evaluation on 12/6/2018 s/p admit to [facility] on 12/5/2018 w/ Nausea & vomiting  P tpresents with acute N/V/D  PT consulted to assess pt's functional mobility and d/c needs  Order placed for PT eval and tx, w/ up w/ A order  Performed at least 2 patient identifiers during session: Name and wristband  Comorbidities affecting pt's physical performance at time of assessment include: dementia, hypertension, chronic kidney disease, osteoporosis, insomnia, HLD  PTA, pt was independent w/ all functional mobility w/ no AD/DME, ambulates unrestricted distances and all terrain, has 7 BARRON, lives alone in 2 level home and retired  Personal factors affecting pt at time of IE include: stairs to enter home, inability to navigate community distances, decreased cognition, limited home support, positive fall history and decreased initiation and engagement  Please find objective findings from PT assessment regarding body systems outlined above with impairments and limitations including weakness, impaired balance, decreased endurance, gait deviations, decreased activity tolerance, fall risk and decreased cognition, as well as mobility assessment (need for SBA assist w/ all phases of mobility when usually ambulating independently and need for cueing for mobility technique)  The following objective measures performed on IE also reveal limitations: Barthel Index: 55/100 and Modified Travis: 4 (moderate/severe disability)  Pt's clinical presentation is currently unstable/unpredictable seen in pt's presentation of need for input for task focus and mobility technique and ongoing medical assessment  Pt to benefit from continued PT tx to address deficits as defined above and maximize level of functional independent mobility and consistency   From PT/mobility standpoint, recommendation at time of d/c would be Home PT with family support vs STR pending progress in order to facilitate return to PLOF 7   Barriers to Discharge Inaccessible home environment;Decreased caregiver support   Goals   Patient Goals to return home   STG Expiration Date 12/16/18   Short Term Goal #1 In 7-10 days: Increase bilateral LE strength 1/2 grade to facilitate independent mobility, Perform all bed mobility tasks modified independent to decrease caregiver burden, Perform all transfers modified independent to improve independence, Ambulate > 150 ft  with least restrictive assistive device modified independent w/o LOB and w/ normalized gait pattern 100% of the time, Navigate 13 stairs modified independent with unilateral handrail to facilitate return to previous living environment, Increase all balance 1/2 grade to decrease risk for falls, Complete exercise program independently, Tolerate 4 hr OOB to faciliate upright tolerance, Improve Barthel Index score to 70 or greater to facilitate independence and PT provider will perform functional balance assessment to determine fall risk   Treatment Day 0   Plan   Treatment/Interventions Functional transfer training;LE strengthening/ROM; Elevations; Therapeutic exercise; Endurance training;Patient/family training;Equipment eval/education; Bed mobility;Gait training;Spoke to nursing;OT   PT Frequency (3-5x/wk)   Recommendation   Recommendation Home PT; Home with family support  (vs STR, with family support)   Equipment Recommended (TBD)   PT - OK to Discharge No   Modified [place] Scale   Modified [place] Scale 4   Barthel Index   Feeding 10   Bathing 0   Grooming Score 5   Dressing Score 5   Bladder Score 10   Bowels Score 10   Toilet Use Score 5   Transfers (Bed/Chair) Score 10   Mobility (Level Surface) Score 0   Stairs Score 0   Barthel Index Score 55         Emmanuel Roberts, PT, DPT

## 2018-12-06 NOTE — DISCHARGE INSTRUCTIONS
Lisinopril (By mouth)   Lisinopril (lye-SIN-oh-pril)  Treats high blood pressure and heart failure  Also given to reduce the risk of death after a heart attack  This medicine is an ACE inhibitor  Brand Name(s): Prinivil, Qbrelis, Zestril   There may be other brand names for this medicine  When This Medicine Should Not Be Used: This medicine is not right for everyone  Do not use it if you had an allergic reaction to lisinopril or another ACE inhibitor, or if you are pregnant  How to Use This Medicine:   Liquid, Tablet  · Take your medicine as directed  Your dose may need to be changed several times to find what works best for you  · Oral liquid: Measure the oral liquid medicine with a marked measuring spoon, oral syringe, or medicine cup  · Missed dose: Take a dose as soon as you remember  If it is almost time for your next dose, wait until then and take a regular dose  Do not take extra medicine to make up for a missed dose  · Store the medicine in a closed container at room temperature, away from heat, moisture, and direct light  Drugs and Foods to Avoid:   Ask your doctor or pharmacist before using any other medicine, including over-the-counter medicines, vitamins, and herbal products  · Do not use this medicine together with aliskiren if you have diabetes  · Some foods and medicines may affect how lisinopril works  Tell your doctor if you are using any of the following:   ¨ Aliskiren, everolimus, lithium, sirolimus, temsirolimus  ¨ Another blood pressure medicine, including an angiotensin receptor blocker (ARB)  ¨ Diuretic (water pill, including amiloride, spironolactone, triamterene)  ¨ Insulin or diabetes medicine  ¨ NSAID pain or arthritis medicine (including aspirin, celecoxib, diclofenac, ibuprofen, naproxen)  · Ask your doctor before you use any medicine, supplement, or salt substitute that contains potassium    Warnings While Using This Medicine:   · It is not safe to take this medicine during pregnancy  It could harm an unborn baby  Tell your doctor right away if you become pregnant  · Tell your doctor if you are breastfeeding, or if you have kidney disease, liver disease, diabetes, or heart or blood vessel disease  · This medicine may cause the following problems:  ¨ Angioedema (severe swelling)  ¨ Kidney problems  ¨ Serious liver problems  · This medicine could lower your blood pressure too much, especially when you first use it or if you are dehydrated  Stand or sit up slowly if you feel lightheaded or dizzy  · Do not stop using this medicine without asking your doctor, even if you feel well  This medicine will not cure your high blood pressure, but it will help keep it in a normal range  You may have to take blood pressure medicine for the rest of your life  · Tell any doctor or dentist who treats you that you are using this medicine  · Your doctor will do lab tests at regular visits to check on the effects of this medicine  Keep all appointments  · Keep all medicine out of the reach of children  Never share your medicine with anyone    Possible Side Effects While Using This Medicine:   Call your doctor right away if you notice any of these side effects:  · Allergic reaction: Itching or hives, swelling in your face or hands, swelling or tingling in your mouth or throat, chest tightness, trouble breathing  · Blistering, peeling, or red skin rash  · Change in how much or how often you urinate  · Confusion, weakness, uneven heartbeat, trouble breathing, numbness or tingling in your hands, feet, or lips  · Dark urine or pale stools, nausea, vomiting, loss of appetite, stomach pain, yellow skin or eyes  · Fever, chills, sore throat, body aches  · Lightheadedness, dizziness, fainting  · Severe stomach pain (with or without nausea or vomiting)  If you notice these less serious side effects, talk with your doctor:   · Dry cough  If you notice other side effects that you think are caused by this medicine, tell your doctor  Call your doctor for medical advice about side effects  You may report side effects to FDA at 9-027-FDA-3288  © 2017 [address] Information is for End User's use only and may not be sold, redistributed or otherwise used for commercial purposes  The above information is an  only  It is not intended as medical advice for individual conditions or treatments  Talk to your doctor, nurse or pharmacist before following any medical regimen to see if it is safe and effective for you

## 2018-12-06 NOTE — OCCUPATIONAL THERAPY NOTE
Occupational Therapy Evaluation      Yamila Estrada    12/6/2018    Patient Active Problem List   Diagnosis    Benign hypertension    Dementia without behavioral disturbance    Nausea & vomiting    Dizziness    Nodule of left lung    Ascending aortic aneurysm [facility])       Past Medical History:   Diagnosis Date    Aortic aneurysm ([facility])     4 1 cm on December 6, 2018    CKD (chronic kidney disease) stage 2, GFR 60-89 ml/min     Colon polyps     Dementia     Hyperlipidemia     Hypertension     Insomnia     Osteoporosis     Uterine fibroid        History reviewed  No pertinent surgical history  12/06/18 0916   Note Type   Note type Eval/Treat   Restrictions/Precautions   Weight Bearing Precautions Per Order No   Braces or Orthoses (none per pt)   Other Precautions Cognitive; Fall Risk;Bed Alarm   Pain Assessment   Pain Assessment 0-10   Pain Score No Pain   Home Living   Type of Home House   Home Layout Two level;Stairs to enter with rails;Bed/bath upstairs; Performs ADLs on one level;1/2 bath on main level  (7STE)   Bathroom Shower/Tub Walk-in shower   Bathroom Toilet Standard   Bathroom Equipment Grab bars in shower;Built-in shower seat   Bathroom Accessibility Accessible   Home Equipment Walker;Cane  (none used at baseline)   Additional Comments "I'm very active"   Prior Function   Level of [place] Independent with ADLs and functional mobility  ((+) )   Lives With [address]   IADLs Independent   Falls in the last 6 months 0   Vocational Retired  ()   Comments pt reports she goes to gym, is easing herself back into activity following clearance from her MD   Lifestyle   Autonomy Pt is a questionable historian due to dementia  Pt reported PLOF was MI with ADLs, IADLs, and driving     Reciprocal Relationships family   Intrinsic Gratification gardening   Psychosocial   Psychosocial (WDL) WDL   Subjective   Subjective "I'm very active"   ADL   Eating Assistance 5  Supervision/Setup   Eating Deficit Increased time to complete   Grooming Assistance 5  Supervision/Setup   Grooming Deficit Increased time to complete   UB Bathing Assistance 5  Supervision/Setup   UB Bathing Deficit Increased time to complete   LB Bathing Assistance 4  Minimal Assistance   LB Bathing Deficit Increased time to complete;Supervision/safety   UB Dressing Assistance 5  Supervision/Setup   UB Dressing Deficit Increased time to complete   LB Dressing Assistance 4  Minimal Assistance   LB Dressing Deficit Increased time to complete;Supervision/safety   Toileting Assistance  5  Supervision/Setup   Toileting Deficit Increased time to complete   Functional Assistance 4  Minimal Assistance   Functional Deficit Supervision/safety; Increased time to complete   Bed Mobility   Rolling R Unable to assess   Additional Comments Pt oob to chair when OT arrived   Transfers   Sit to Stand 5  Supervision   Additional items Assist x 1; Armrests; Increased time required   Stand to Sit 5  Supervision   Additional items Assist x 1; Armrests; Increased time required   Functional Mobility   Functional Mobility 5  Supervision   Balance   Static Sitting Good   Dynamic Sitting Good   Static Standing Fair +   Dynamic Standing Fair   Activity Tolerance   Activity Tolerance Patient tolerated treatment well   Nurse Made Aware yes, spoke with pt's RNShila, who stated pt was appropriate for OT and made aware of outcomes   RUE Assessment   RUE Assessment WFL   LUE Assessment   LUE Assessment WFL   Hand Function   Gross Motor Coordination Functional   Fine Motor Coordination Functional   Sensation   Light Touch No apparent deficits   Sharp/Dull No apparent deficits   Stereognosis No apparent deficits   Vision-Basic Assessment   Current Vision Wears glasses all the time   Vision - Complex Assessment   Ocular Range of Motion [facility]   Perception   Inattention/Neglect Appears intact   Cognition   Overall Cognitive Status Impaired  (memory deficts, mild confusion, perseverations)   Arousal/Participation Alert;Arousable   Attention Attends with cues to redirect   Orientation Level Oriented X4   Memory Decreased recall of recent events;Decreased short term memory  (takes awhile to recall with minimal prompting)   Following Commands Follows multistep commands with increased time or repetition   Comments Pt was agreeable to OT eval   Assessment   Limitation Decreased ADL status; Decreased Safe judgement during ADL;Decreased cognition;Decreased endurance;Decreased self-care trans;Decreased high-level ADLs   Prognosis Good   Assessment Pt is a 74 y o  female seen for OT evaluation s/p admit to [facility] on 12/5/2018 w/ Exertional dyspnea  Comorbidities affecting pt's functional performance at time of assessment include:anxiety, anemia, CAD, COPD, CKD, depression, HTN and PVD   Orders placed for OT evaluation and treatment and "activity as tolerated" order  Performed at least two patient identifiers during session including name and wristband  Personal factors affecting pt at time of IE include:steps to enter environment, behavioral pattern, difficulty performing ADLS, difficulty performing IADLS , compliance, decreased initiation and engagement , financial barriers, health management  and environment  Prior to admission, pt reports MI with ADLs, MI with IADLs, and (+) driving  Upon evaluation: Pt requires S with UB ADLs, S with LB ADLs, S with xfers and S with functional mobility 2* the following deficits impacting occupational performance: weakness, decreased strength, decreased balance, decreased tolerance, impaired problem solving, decreased safety awareness, impaired interpersonal skills, decreased coping skills, decreased mobilty and environmental deficits   Pt to benefit from continued skilled OT tx while in the hospital to address deficits as defined above and maximize level of functional independence w ADL's and functional mobility  Occupational Performance areas to address include: bathing/shower, toilet hygiene, dressing, medication management, socialization, health maintenance, community mobility, clothing management, money management, household maintenance and social participation  From OT standpoint, recommendation at time of d/c would be home OT  Plan   Treatment Interventions ADL retraining;Functional transfer training;UE strengthening/ROM; Endurance training;Cognitive reorientation;Patient/family training;Equipment evaluation/education; Compensatory technique education;Continued evaluation;Cardiac education; Energy conservation; Activityengagement   Goal Expiration Date 12/19/18   OT Frequency 3-5x/wk   Recommendation   OT Discharge Recommendation Home OT  (with S )   OT - OK to Discharge No   Barthel Index   Feeding 10   Bathing 0   Grooming Score 5   Dressing Score 5   Bladder Score 10   Bowels Score 10   Toilet Use Score 5   Transfers (Bed/Chair) Score 10   Mobility (Level Surface) Score 10   Stairs Score 0   Barthel Index Score 65   Modified [place] Scale   Modified [place] Scale 4     Occupational therapy Goals: In 2-4 days    1 - Patient will completed passport to independence program in order to further determine DC needs as well as any additional recommendations/ education  2 - Patient will verbalize and demonstrate use of energy conservation/ deep breathing technique and work simplification skills during functional activity with no verbal cues  3 - Patient will verbalize and demonstrate good body mechanics and joint protection techniques during ADLs/ IADLs with no verbal cues     4 - Patient will increase OOB/ sitting tolerance to 4-6 hours per day for increased participation in self care and leisure tasks with no s/s of exertion  5 - Patient will identify s/s of exertion during ADL and functional mobility with no verbal cues       6 - Patient will verbalize/ demonstrate compensatory strategies to recover from exertion with no verbal cues  7 - Patient will increase standing tolerance time to 10 minutes with No UE support to complete sink level ADLs @ Mod I level  8 - Patient will increase sitting tolerance at edge of bed to 30 minutes to complete UB ADLs @ Indep  level       9 - Patient/ Family will demonstrate competency with [address] MS OTR/L

## 2018-12-07 ENCOUNTER — TRANSITIONAL CARE MANAGEMENT (OUTPATIENT)
Dept: FAMILY MEDICINE CLINIC | Facility: CLINIC | Age: 78
End: 2018-12-07

## 2018-12-11 LAB
BACTERIA BLD CULT: NORMAL
BACTERIA BLD CULT: NORMAL

## 2018-12-13 ENCOUNTER — OFFICE VISIT (OUTPATIENT)
Dept: FAMILY MEDICINE CLINIC | Facility: CLINIC | Age: 78
End: 2018-12-13
Payer: COMMERCIAL

## 2018-12-13 VITALS
SYSTOLIC BLOOD PRESSURE: 130 MMHG | OXYGEN SATURATION: 98 % | BODY MASS INDEX: 19.52 KG/M2 | HEIGHT: 61 IN | WEIGHT: 103.4 LBS | HEART RATE: 72 BPM | DIASTOLIC BLOOD PRESSURE: 90 MMHG | RESPIRATION RATE: 14 BRPM | TEMPERATURE: 98.8 F

## 2018-12-13 DIAGNOSIS — N18.30 CKD (CHRONIC KIDNEY DISEASE) STAGE 3, GFR 30-59 ML/MIN (HCC): ICD-10-CM

## 2018-12-13 DIAGNOSIS — F03.90 DEMENTIA WITHOUT BEHAVIORAL DISTURBANCE, UNSPECIFIED DEMENTIA TYPE (HCC): Primary | ICD-10-CM

## 2018-12-13 DIAGNOSIS — R91.1 NODULE OF LEFT LUNG: ICD-10-CM

## 2018-12-13 DIAGNOSIS — I71.2 ASCENDING AORTIC ANEURYSM (HCC): ICD-10-CM

## 2018-12-13 PROCEDURE — 1160F RVW MEDS BY RX/DR IN RCRD: CPT | Performed by: FAMILY MEDICINE

## 2018-12-13 PROCEDURE — 4040F PNEUMOC VAC/ADMIN/RCVD: CPT | Performed by: FAMILY MEDICINE

## 2018-12-13 PROCEDURE — 99214 OFFICE O/P EST MOD 30 MIN: CPT | Performed by: FAMILY MEDICINE

## 2018-12-13 PROCEDURE — 3008F BODY MASS INDEX DOCD: CPT | Performed by: FAMILY MEDICINE

## 2018-12-13 RX ORDER — A/SINGAPORE/GP1908/2015 IVR-180 (H1N1) (AN A/MICHIGAN/45/2015 (H1N1)PDM09-LIKE VIRUS), A/HONG KONG/4801/2014, NYMC X-263B (H3N2) (AN A/HONG KONG/4801/2014-LIKE VIRUS), AND B/BRISBANE/60/2008, WILD TYPE (A B/BRISBANE/60/2008-LIKE VIRUS) 15; 15; 15 UG/.5ML; UG/.5ML; UG/.5ML
INJECTION, SUSPENSION INTRAMUSCULAR
COMMUNITY
Start: 2018-09-13 | End: 2019-06-26 | Stop reason: ALTCHOICE

## 2018-12-13 NOTE — ASSESSMENT & PLAN NOTE
Since patient is at low risk for lung cancer will consider a CT of the chest in 1 year as follow up for the lung nodule and the ascending and you're is some

## 2018-12-13 NOTE — ASSESSMENT & PLAN NOTE
Will keep patient on Aricept 5 mg this month and next month increase to 10 mg and consider adding Namenda

## 2018-12-13 NOTE — PROGRESS NOTES
Assessment/Plan:     Chronic Problems:  Benign hypertension  Blood pressure is slightly higher than I would like to see it however I will keep her off the amlodipine at this time and just keep her on the 5 mg of lisinopril  Will recheck here in about 1 month  Dementia without behavioral disturbance  Will keep patient on Aricept 5 mg this month and next month increase to 10 mg and consider adding Namenda  Nodule of left lung  Since patient is at low risk for lung cancer will consider a CT of the chest in 1 year as follow up for the lung nodule and the ascending and you're is some  Ascending aortic aneurysm ([facility] [address] )  Will repeat CT of the chest in 1 year  CKD (chronic kidney disease) stage 3, GFR 30-59 ml/min (HCC)  Will continue to follow  Visit Diagnosis:  Diagnoses and all orders for this visit:    Dementia without behavioral disturbance, unspecified dementia type    Nodule of left lung    Ascending aortic aneurysm (HCC)    CKD (chronic kidney disease) stage 3, GFR 30-59 ml/min (HCC)    Other orders  -     FLUAD 0 5 ML CORDELL;         Subjective:     Patient ID: Arpan Marc is a [de-identified] y o  female  Pt is here for f/u s/p admission from [facility]  Pt is here with her daughter in law today  Pt was seen here and started on amlodipine and donepezil  Vomited about 1/2 hour after taking meds but she is not sure what med  Pt is not sure who called the ambulance several days later  Daviduriel River, her other step daughter is not here today who is the historian for pt  Pt is not even sure why the ambulance was called  ER records reviewed  Today feels great  Pt was told to continue the donepezil but to hold the amlodipine  Still on the lisinopril  No side effects from the current meds  Feels she has slowly gotten better           Active Problems    Patient Active Problem List   Diagnosis    Benign hypertension    Dementia without behavioral disturbance    Nausea & vomiting    Dizziness    Nodule of left lung    Ascending aortic aneurysm (HCC)    CKD (chronic kidney disease) stage 3, GFR 30-59 ml/min [facility])       Past Medical History     Past Medical History:   Diagnosis Date    Aortic aneurysm (HCC)     4 1 cm on December 6, 2018    CKD (chronic kidney disease) stage 2, GFR 60-89 ml/min     Colon polyps     Dementia     Hyperlipidemia     Hypertension     Insomnia     Osteoporosis     Uterine fibroid        Surgical History    No past surgical history on file  Current Meds       Current Outpatient Prescriptions:     donepezil (ARICEPT) 5 mg tablet, Take 1 tablet (5 mg total) by mouth daily at bedtime, Disp: 30 tablet, Rfl: 0    lisinopril (ZESTRIL) 5 mg tablet, Take 1 tablet (5 mg total) by mouth daily, Disp: 30 tablet, Rfl: 2    FLUAD 0 5 ML CORDELL, , Disp: , Rfl:     Allergies    Allergies   Allergen Reactions    Cefazolin      Other reaction(s): Unknown       No images are attached to the encounter      Health Management    Health Maintenance   Topic Date Due    Medicare Annual Wellness Visit (AWV)  1940    DTaP,Tdap,and Td Vaccines (2 - Td) 12/03/2019 (Originally 11/16/2019)    Fall Risk  12/03/2019    Depression Screening PHQ  12/03/2019    Urinary Incontinence Screening  12/03/2019    INFLUENZA VACCINE  Completed    Pneumococcal PPSV23/PCV13 65+ Years / Low and Medium Risk  Completed       CBC:     Results from last 6 Months  Lab Units 12/06/18  0454 12/05/18  1247   WBC Thousand/uL 6 49 7 37   RBC Million/uL 4 08 4 86   HEMOGLOBIN g/dL 13 3 15 6*   HEMATOCRIT % 38 7 45 2   MCV fL 95 93   MCH pg 32 6 32 1   MCHC g/dL 34 4 34 5   RDW % 12 3 12 0   MPV fL 9 5 9 8   PLATELETS Thousands/uL 297 335   NRBC AUTO /100 WBCs  --  0   NEUTROS PCT %  --  86*   LYMPHS PCT %  --  10*   MONOS PCT %  --  3*   EOS PCT %  --  0   BASOS PCT %  --  1   NEUTROS ABS Thousands/µL  --  6 31   LYMPHS ABS Thousands/µL  --  0 75   MONOS ABS Thousand/µL  --  0 20   EOS ABS Thousand/µL  --  0 01     Chemistry Profile: Results from last 6 Months  Lab Units 12/06/18  0454 12/05/18  1247   POTASSIUM mmol/L 5 0 3 2*   CHLORIDE mmol/L 107 99*   CO2 mmol/L 24 26   BUN mg/dL 16 16   CREATININE mg/dL 1 12 0 98   GLUCOSE FASTING mg/dL 107*  --    CALCIUM mg/dL 8 7 9 4   MAGNESIUM mg/dL 2 0 1 9   PHOSPHORUS mg/dL 2 9  --    AST U/L  --  18   ALT U/L  --  21   ALK PHOS U/L  --  77   EGFR ml/min/1 73sq m 47 55     Coagulation Studies:     Results from last 6 Months  Lab Units 12/05/18  1247   PROTIME seconds 12 0   INR  0 89   PTT seconds 25*     Endocrine Studies:     Results from last 6 Months  Lab Units 12/05/18  1247   TSH 3RD GENERATON uIU/mL 3 014       Imaging: Ct Head Without Contrast    Result Date: 12/5/2018  Narrative: CT BRAIN - WITHOUT CONTRAST INDICATION:   dizziness/lightheadedness  COMPARISON:  None  TECHNIQUE:  CT examination of the brain was performed  In addition to axial images, coronal 2D reformatted images were created and submitted for interpretation  Radiation dose length product (DLP) for this visit:  900 mGy-cm   This examination, like all CT scans performed in the [facility], was performed utilizing techniques to minimize radiation dose exposure, including the use of iterative reconstruction and automated exposure control  IMAGE QUALITY:  Diagnostic  FINDINGS: PARENCHYMA:  No intracranial mass, mass effect or midline shift  No CT signs of acute infarction  No acute parenchymal hemorrhage  Moderate  periventricular and white matter hypodensity seen related to chronic small vessel ischemic changes VENTRICLES AND EXTRA-AXIAL SPACES:  Normal for the patient's age  VISUALIZED ORBITS AND PARANASAL SINUSES:  Unremarkable   CALVARIUM AND EXTRACRANIAL SOFT TISSUES:  Normal      Impression: No acute intracranial hemorrhage seen Moderate periventricular and white matter hypodensity seen related to chronic small vessel ischemic Workstation performed: [id number]     Ct Chest Abdomen Pelvis W Contrast    Result Date: 12/5/2018  Narrative: CT CHEST, ABDOMEN AND PELVIS WITH IV CONTRAST INDICATION:   productive cough, n/v/d starting yesterday  COMPARISON:  None  TECHNIQUE: CT examination of the chest, abdomen and pelvis was performed  Axial, sagittal, and coronal 2D reformatted images were created from the source data and submitted for interpretation  Radiation dose length product (DLP) for this visit:  396 mGy-cm   This examination, like all CT scans performed in the [facility], was performed utilizing techniques to minimize radiation dose exposure, including the use of iterative reconstruction and automated exposure control  IV Contrast:  100 mL of iohexol (OMNIPAQUE) Enteric Contrast: Enteric contrast was administered  FINDINGS: CHEST LUNGS:  Biapical scarring seen No acute consolidation seen Trachea and central bronchial patent A nodule is seen in the left lower lobe measuring about 3 mm in image 84 series 3 A perifissural nodule seen within the left midlung compatible with granuloma PLEURA:  Unremarkable  HEART/GREAT VESSELS:  The ascending aorta is dilated measuring about 4 1 cm Atherosclerotic calcification seen within the coronary arteries MEDIASTINUM AND REMEDIOS:  Unremarkable  CHEST WALL AND LOWER NECK:   There is a right thyroid nodule which measures by 6 9 mm ABDOMEN LIVER/BILIARY TREE:  Unremarkable  GALLBLADDER:  No calcified gallstones  No pericholecystic inflammatory change  SPLEEN:  Unremarkable  PANCREAS:  Unremarkable  ADRENAL GLANDS:  Unremarkable  KIDNEYS/URETERS:  Too small to characterize hypodensities seen within the mid right kidney and in the left kidney statistically represent cysts STOMACH AND BOWEL:  No abnormal dilation of the small bowel loops seen Diverticulosis seen APPENDIX:  No findings to suggest appendicitis  ABDOMINOPELVIC CAVITY:  No ascites or free intraperitoneal air  No lymphadenopathy  VESSELS:  The celiac trunk is patent    The SMA is patent PELVIS REPRODUCTIVE ORGANS:  Unremarkable for patient's age  URINARY BLADDER:  Unremarkable  ABDOMINAL WALL/INGUINAL REGIONS:  There is a right inguinal hernia seen probably direct OSSEOUS STRUCTURES:  No acute fracture or destructive osseous lesion  Impression: No acute consolidation Incidentally detected 3 mm nodule in the left lower lung  Based on current Fleischner Society 2017 Guidelines on incidental pulmonary nodule, no routine follow-up is needed if the patient is considered low risk for lung cancer  If the patient is considered high risk for lung cancer, 12 month follow-up non-contrast chest CT is recommended  Dilated ascending aorta measuring about 4 1 cm consider cardiac echo to evaluate for any evolving heart disease No evidence of bowel obstruction No acute inflammatory stranding Small right inguinal hernia containing colonic loop without evidence of obstruction Workstation performed: [id number]         Review of Systems   Constitutional: Negative for chills, diaphoresis, fatigue and fever  HENT: Negative  Eyes: Negative  Respiratory: Negative for cough, shortness of breath and wheezing  Cardiovascular: Negative for chest pain and palpitations  Gastrointestinal: Negative for abdominal pain, constipation, diarrhea, nausea and vomiting  Genitourinary: Negative  Musculoskeletal: Negative for arthralgias and myalgias  Neurological: Negative for dizziness, light-headedness and headaches  Psychiatric/Behavioral: Negative for dysphoric mood  The patient is not nervous/anxious  Having some family conflicts over Kian and has some stress  Objective:     Physical Exam   Constitutional: She is oriented to person, place, and time  She appears well-developed and well-nourished  No distress  HENT:   Head: Normocephalic and atraumatic     Right Ear: External ear normal    Left Ear: External ear normal    Mouth/Throat: Oropharynx is clear and moist    Eyes: Pupils are equal, round, and reactive to light  Conjunctivae and EOM are normal  Right eye exhibits no discharge  Left eye exhibits no discharge  Neck: Normal range of motion  Neck supple  No thyromegaly present  No bruits  Cardiovascular: Normal rate and normal heart sounds  Exam reveals no friction rub  No murmur heard  Slightly irregular  Pulmonary/Chest: Effort normal and breath sounds normal  No respiratory distress  She has no wheezes  She has no rales  Abdominal: Soft  Bowel sounds are normal  There is no tenderness  Musculoskeletal: Normal range of motion  She exhibits no edema, tenderness or deformity  Neurological: She is alert and oriented to person, place, and time  No cranial nerve deficit  ? Facial droop on the left but daughter in law states this is no different than usual     Skin: Skin is warm and dry  No rash noted  She is not diaphoretic  Psychiatric: She has a normal mood and affect  Her behavior is normal  Judgment and thought content normal          /90   Pulse 72   Temp 98 8 °F (37 1 °C) (Tympanic)   Resp 14   Ht 5' 1" (1 549 m)   Wt 46 9 kg (103 lb 6 4 oz)   SpO2 98%   BMI 19 54 kg/m²     Vitals:    12/13/18 1603   BP: 130/90   Pulse: 72   Resp: 14   Temp: 98 8 °F (37 1 °C)   TempSrc: Tympanic   SpO2: 98%   Weight: 46 9 kg (103 lb 6 4 oz)   Height: 5' 1" (1 549 m)       Transitional Care Management Review:  Pritesh Bhat is a [de-identified] y o  female here for TCM follow up  During the TCM phone call patient stated:    TCM Call (since 11/12/2018)     Date and time call was made  12/7/2018 10:04 AM    Hospital care reviewed  Records reviewed    Patient [facility] hospitial[facility] at  [facility]    Date of Admission  12/05/18    Date of discharge  12/06/18    Diagnosis  Nausea & vomiting     Disposition  Home    Were the patients medications reviewed and updated  No    Current Symptoms  Neausea;  Middle abdominal pain    Neausea severity  Moderate    Middle abdominal pain severity Moderate    Middle abdominal pain onset  Progressive      TCM Call (since 11/12/2018)     Post hospital issues  None    Should patient be enrolled in anticoag monitoring? No    Scheduled for follow up? Yes    Did you obtain your prescribed medications  Yes    Do you need help managing your prescriptions or medications  No    Is transportation to your appointment needed  No    I have advised the patient to call PCP with any new or worsening symptoms  Willie DEGROOT      Living Arrangements  Children    Are you recieving any outpatient services  No    Are you recieving home care services  Yes    Types of home care services  Nurse visit; Home PT    Are you using any community resources  No    Current waiver services  No    Have you fallen in the last 12 months  No    Interperter language line needed  No    Counseling  Family                      Sushma Brooks Ala

## 2018-12-13 NOTE — PATIENT INSTRUCTIONS
Discussed all with patient and her daughter-in-law  Although your blood pressure is slightly elevated today I do not want you to restart the amlodipine  Stay on the lisinopril for now and continue to monitor the blood pressures at home  Will keep you on Aricept 5 mg at bedtime for this month  Follow up here in about 4 weeks will consider increasing the Aricept to 10 mg and then in about a month after that will consider adding Namenda  Since you have both a 3 mm nodule in the lung and up mildly dilated aortic aneurysm I will repeat your chest CT scan in 1 year  Also discussed with the daughter-in-law that you may want to discuss options as at some point Morenita Turner will not be able to live alone

## 2018-12-13 NOTE — ASSESSMENT & PLAN NOTE
Blood pressure is slightly higher than I would like to see it however I will keep her off the amlodipine at this time and just keep her on the 5 mg of lisinopril  Will recheck here in about 1 month

## 2019-01-02 LAB — CREAT ?TM UR-SCNC: 27.4 UMOL/L

## 2019-01-03 ENCOUNTER — TELEPHONE (OUTPATIENT)
Dept: FAMILY MEDICINE CLINIC | Facility: CLINIC | Age: 79
End: 2019-01-03

## 2019-01-03 ENCOUNTER — TELEPHONE (OUTPATIENT)
Dept: OTHER | Facility: OTHER | Age: 79
End: 2019-01-03

## 2019-01-03 NOTE — TELEPHONE ENCOUNTER
Patients daughter called bc the patient cancelled her appt this morning bc she was having really bad diarrhea    Patient was scheduled for later on today bc her daughter stated that she has been roughly 45 hrs without the diarrhea     Patients daughter will call back if that appt does not work

## 2019-01-03 NOTE — TELEPHONE ENCOUNTER
Gayathri Manning 1940  Call Id: [id number]  Health Call  Standard Call Report  Patient Name: Gayathri Manning  Gender: Female  : 1940  Age: [de-identified] Y 8 M 15 D  Return Phone Number: [phone] (Home)  [facility] [address]  City/State/Zip: [place] [address]  Practice Name: [facility]  Physician:  Caller Name: Tenisha Yates  Relationship To  Patient: Self  Return Phone Number: [phone] (Home)  Presenting Problem: "I have severe diarrhea "  Service Type: Triage  Charged Service 1: Triages  Nurse Assessment  Nurse: Barry Solis RN Date/Time: 1/3/2019 8:28:05 AM  Type of assessment required:  ---General (Adult or Child)  Duration of Current S/S  ---Onset at 0500  Location/Radiation  ---Lower GI  Temperature (F) and route:  ---Does not feel she has a fever  Symptom Specific Meds (Dose/Time):  ---None  Other S/S  ---Diarrhea started at 0530 and after 5 episodes she developed chills in her hands and feet  / Growling and gas in the belly  Pain Scale on scale of 1-10, 10 being the worst:  ---Denies pain  Symptom progression: ---same  Intake and Output  ---One cup of tea this morning  Last Exam/Treatment:  ---One month ago / Was hospitalized with vomiting and diarrhea over a month ago  Protocols  Protocol Title Nurse Date/Time  Diarrhea Barry Solis RN 1/3/2019 8:33:53 AM  Question Caller Affirmed  Disp  Time Disposition Final User  1/3/2019 8:36:11 AM See Physician within   Kristian Ballesteros RN, Ashok Harding  1/3/2019 8:37:46 AM RN Triaged Yes Nini Bae RN, [address] [place] [address] Advice Given Per Protocol  SEE PHYSICIAN WITHIN 24 HOURS: * IF OFFICE WILL BE OPEN: You need to be seen within the next 24 hours  Call your doctor when the office opens, and make an appointment  FLUID THERAPY DURING SEVERE DIARRHEA: * Drink more fluids, at  least 8-10 cups daily  One cup equals 8 oz (240 ml)  * WATER: Even for severe diarrhea, water is often the best liquid to drink   You should also eat some salty foods (e g , potato chips, pretzels, saltine crackers)  This is important to make sure you are getting enough salt,  sugars, and fluids to meet your body's needs  * SPORTS DRINKS: You can also drink a sports drinks (e g , Gatorade, Powerade) to help treat and prevent dehydration  For it to work best, mix it half and half with water  * Avoid caffeinated beverages (Reason: caffeine is  mildly dehydrating)  * Avoid alcohol beverages (beer, wine, hard liquor)  FOOD AND NUTRITION DURING SEVERE DIARRHEA:  * Drinking enough liquids is more important that eating when one has severe diarrhea  * As the diarrhea starts to get better, you can  slowly return to a normal diet  * Begin with boiled starches / cereals (e g , potatoes, rice, noodles, wheat, oats) with a small amount of  salt to taste  * Other foods that are OK include: bananas, yogurt, crackers, soup  CONTAGIOUSNESS: * Be certain to wash your hands after using the restroom  * If your work is cooking, handling, serving or preparing food, then you should not work until the diarrhea has  completely stopped  CALL BACK IF: * Signs of dehydration occur (e g , no urine over 12 hours, very dry mouth, lightheaded, etc ) *  Bloody stools * Constant or severe abdominal pain * You become worse  CARE ADVICE given per Diarrhea (Adult) guideline  Caller Understands: Yes  Caller Disagree/Comply: Comply  PreDisposition: Unsure  Comments:  Patient also called to cancel appointment for this morning    User: Johnny Caballero RN Date/Time: 1/3/2019 8:38:21 AM

## 2019-01-04 ENCOUNTER — OFFICE VISIT (OUTPATIENT)
Dept: FAMILY MEDICINE CLINIC | Facility: CLINIC | Age: 79
End: 2019-01-04
Payer: COMMERCIAL

## 2019-01-04 VITALS
BODY MASS INDEX: 19.63 KG/M2 | OXYGEN SATURATION: 98 % | TEMPERATURE: 98.3 F | RESPIRATION RATE: 16 BRPM | DIASTOLIC BLOOD PRESSURE: 90 MMHG | HEART RATE: 60 BPM | HEIGHT: 61 IN | SYSTOLIC BLOOD PRESSURE: 110 MMHG | WEIGHT: 104 LBS

## 2019-01-04 DIAGNOSIS — F03.90 DEMENTIA WITHOUT BEHAVIORAL DISTURBANCE, UNSPECIFIED DEMENTIA TYPE (HCC): Primary | ICD-10-CM

## 2019-01-04 DIAGNOSIS — I10 BENIGN HYPERTENSION: ICD-10-CM

## 2019-01-04 DIAGNOSIS — R19.7 DIARRHEA, UNSPECIFIED TYPE: ICD-10-CM

## 2019-01-04 PROCEDURE — 99214 OFFICE O/P EST MOD 30 MIN: CPT | Performed by: FAMILY MEDICINE

## 2019-01-04 RX ORDER — CHOLESTYRAMINE 4 G/9G
1 POWDER, FOR SUSPENSION ORAL
Qty: 90 PACKET | Refills: 0 | Status: SHIPPED | OUTPATIENT
Start: 2019-01-04 | End: 2019-07-26 | Stop reason: ALTCHOICE

## 2019-01-04 NOTE — PATIENT INSTRUCTIONS
Discussed all with patient and her stepdaughter  Everyone agrees that Trent Forrester still needs meds for her memory  Since the diarrhea has resolved will continue the aricept cautiously and add cholestyramine at least daily but can have up to 3 times daily  Must have at least 6 glasses of water daily, 3 meals and a snack  You need to gain weight  Call here by Monday  If the diarrhea returns will need to d/c the aricept until normal bowels and then consider another medication  Stressed the importance of hydration and meals

## 2019-01-04 NOTE — ASSESSMENT & PLAN NOTE
Diastolic bp is slightly elevated  Advised to check the bp's at home, record the numbers and bring to f/u in 3 weeks

## 2019-01-04 NOTE — ASSESSMENT & PLAN NOTE
Discussed with pt and family  They both agree she needs to stay on this med  Will keep pt on aricept as she has no diarrhea today, but pt was instructed to call by Monday  Prefer her to start metamucil 1-2 tbsp in 6 to 8 ozs of water daily  If diarrhea returns will need to d/c aricept and give trial off to see if the diarrhea resolves  If diarrhea resolves will consider a trial of either another med or switch to namenda

## 2019-01-04 NOTE — PROGRESS NOTES
Assessment/Plan:     Chronic Problems:  Dementia without behavioral disturbance  Discussed with pt and family  They both agree she needs to stay on this med  Will keep pt on aricept as she has no diarrhea today, but pt was instructed to call by Monday  Prefer her to start metamucil 1-2 tbsp in 6 to 8 ozs of water daily  If diarrhea returns will need to d/c aricept and give trial off to see if the diarrhea resolves  If diarrhea resolves will consider a trial of either another med or switch to namenda  Benign hypertension  Diastolic bp is slightly elevated  Advised to check the bp's at home, record the numbers and bring to f/u in 3 weeks  Visit Diagnosis:  Diagnoses and all orders for this visit:    Dementia without behavioral disturbance, unspecified dementia type    Diarrhea, unspecified type  Comments:  Still suspect this is related to Aricept but wound since patient has no diarrhea will give her trial till Monday  Needs 6 glasses of water daily, 3 meals & snack  Orders:  -     cholestyramine (QUESTRAN) 4 g packet; Take 1 packet (4 g total) by mouth 3 (three) times a day with meals    Benign hypertension          Subjective:    Patient ID: Joana Cunha is a [de-identified] y o  female  Pt is here with c/o diarrhea and gas since the 5th of December  Was admitted to [facility] for 2 days and discharged home with no further diarrhea or vomiting  Pt states the diarrhea started immediately again after discharge, but no vomiting  Had an appt here earlier this week, but could not come r/t the diarrhea  Today is the first day with no diarrhea  Still had slight gas this am which is normal for her  No abdominal pains  No bm at all today, just gas  Only took tums and her usual meds  Step daughter is with pt today  According to her, pt is still having issues with memory and when she is stressed her memory is worse  Takes her meds as directed           The following portions of the patient's history were reviewed and updated as appropriate: allergies, current medications, past family history, past medical history, past social history, past surgical history and problem list     Review of Systems   Constitutional: Negative for chills, diaphoresis, fatigue and fever  HENT: Negative  Eyes: Negative  Respiratory: Negative for cough, shortness of breath and wheezing  Cardiovascular: Negative for chest pain and palpitations  Gastrointestinal: Positive for diarrhea (but today is the first day she has not had diarrhea  )  Negative for abdominal distention, abdominal pain, nausea and vomiting  Genitourinary: Negative  Skin:        Flat lesions on the left side of face for the last several weeks  Neurological: Positive for light-headedness (when she stands at times, but no h/o fall  )  Negative for dizziness and headaches  Psychiatric/Behavioral: Positive for decreased concentration  /90   Pulse 60   Temp 98 3 °F (36 8 °C)   Resp 16   Ht 5' 1" (1 549 m)   Wt 47 2 kg (104 lb)   SpO2 98%   BMI 19 65 kg/m²   Social History     Social History    Marital status:      Spouse name: N/A    Number of children: N/A    Years of education: N/A     Occupational History    Not on file  Social History Main Topics    Smoking status: Never Smoker    Smokeless tobacco: Never Used    Alcohol use 4 2 oz/week     7 Glasses of wine per week    Drug use: No    Sexual activity: No     Other Topics Concern    Not on file     Social History Narrative    No narrative on file     Past Medical History:   Diagnosis Date    Aortic aneurysm (HCC)     4 1 cm on December 6, 2018    CKD (chronic kidney disease) stage 2, GFR 60-89 ml/min     Colon polyps     Dementia     Hyperlipidemia     Hypertension     Insomnia     Osteoporosis     Uterine fibroid      No family history on file  No past surgical history on file      Current Outpatient Prescriptions:     donepezil (ARICEPT) 5 mg tablet, Take 1 tablet (5 mg total) by mouth daily at bedtime, Disp: 30 tablet, Rfl: 0    FLUAD 0 5 ML CORDELL, , Disp: , Rfl:     lisinopril (ZESTRIL) 5 mg tablet, Take 1 tablet (5 mg total) by mouth daily, Disp: 30 tablet, Rfl: 2    cholestyramine (QUESTRAN) 4 g packet, Take 1 packet (4 g total) by mouth 3 (three) times a day with meals, Disp: 90 packet, Rfl: 0    Allergies   Allergen Reactions    Cefazolin      Other reaction(s): Unknown          Lab Review   Orders Only on 01/02/2019   Component Date Value    EXT Creatinine Urine 01/02/2019 27 4    Admission on 12/05/2018, Discharged on 12/06/2018   Component Date Value    Ventricular Rate 12/05/2018 76     Atrial Rate 12/05/2018 76     DC Interval 12/05/2018 170     QRSD Interval 12/05/2018 88     QT Interval 12/05/2018 422     QTC Interval 12/05/2018 474     P Axis 12/05/2018 59     QRS Axis 12/05/2018 -24     T Wave Axis 12/05/2018 73     WBC 12/05/2018 7 37     RBC 12/05/2018 4 86     Hemoglobin 12/05/2018 15 6*    Hematocrit 12/05/2018 45 2     MCV 12/05/2018 93     MCH 12/05/2018 32 1     MCHC 12/05/2018 34 5     RDW 12/05/2018 12 0     MPV 12/05/2018 9 8     Platelets 50/03/6299 335     nRBC 12/05/2018 0     Neutrophils Relative 12/05/2018 86*    Immat GRANS % 12/05/2018 0     Lymphocytes Relative 12/05/2018 10*    Monocytes Relative 12/05/2018 3*    Eosinophils Relative 12/05/2018 0     Basophils Relative 12/05/2018 1     Neutrophils Absolute 12/05/2018 6 31     Immature Grans Absolute 12/05/2018 0 02     Lymphocytes Absolute 12/05/2018 0 75     Monocytes Absolute 12/05/2018 0 20     Eosinophils Absolute 12/05/2018 0 01     Basophils Absolute 12/05/2018 0 08     Protime 12/05/2018 12 0     INR 12/05/2018 0 89     PTT 12/05/2018 25*    Blood Culture 12/05/2018 No Growth After 5 Days   Blood Culture 12/05/2018 No Growth After 5 Days       Rapid Influenza A Ag 12/05/2018 Negative     Rapid Influenza B Ag 12/05/2018 Negative     Color, UA 12/05/2018 Light Yellow     Clarity, UA 12/05/2018 Clear     Specific Gravity, UA 12/05/2018 1 010     pH, UA 12/05/2018 7 5     Leukocytes, UA 12/05/2018 Negative     Nitrite, UA 12/05/2018 Negative     Protein, UA 12/05/2018 Negative     Glucose, UA 12/05/2018 Negative     Ketones, UA 12/05/2018 15 (1+)*    Urobilinogen, UA 12/05/2018 0 2     Bilirubin, UA 12/05/2018 Negative     Blood, UA 12/05/2018 Negative     Troponin I 12/05/2018 <0 02     Sodium 12/05/2018 140     Potassium 12/05/2018 3 2*    Chloride 12/05/2018 99*    CO2 12/05/2018 26     ANION GAP 12/05/2018 15*    BUN 12/05/2018 16     Creatinine 12/05/2018 0 98     Glucose 12/05/2018 132     Calcium 12/05/2018 9 4     eGFR 12/05/2018 55     Total Bilirubin 12/05/2018 1 90*    Bilirubin, Direct 12/05/2018 0 33*    Alkaline Phosphatase 12/05/2018 77     AST 12/05/2018 18     ALT 12/05/2018 21     Total Protein 12/05/2018 8 3*    Albumin 12/05/2018 4 6     Magnesium 12/05/2018 1 9     Lipase 12/05/2018 125     LACTIC ACID 12/05/2018 1 4     INFLU A PCR 12/05/2018 None Detected     INFLU B PCR 12/05/2018 None Detected     RSV PCR 12/05/2018 None Detected     TSH 3RD GENERATON 12/05/2018 3 014     Sodium 12/06/2018 140     Potassium 12/06/2018 5 0     Chloride 12/06/2018 107     CO2 12/06/2018 24     ANION GAP 12/06/2018 9     BUN 12/06/2018 16     Creatinine 12/06/2018 1 12     Glucose 12/06/2018 107     Glucose, Fasting 12/06/2018 107*    Calcium 12/06/2018 8 7     eGFR 12/06/2018 47     Magnesium 12/06/2018 2 0     Phosphorus 12/06/2018 2 9     WBC 12/06/2018 6 49     RBC 12/06/2018 4 08     Hemoglobin 12/06/2018 13 3     Hematocrit 12/06/2018 38 7     MCV 12/06/2018 95     MCH 12/06/2018 32 6     MCHC 12/06/2018 34 4     RDW 12/06/2018 12 3     Platelets 59/95/8513 297     MPV 12/06/2018 9 5         Imaging: Ct Head Without Contrast    Result Date: 12/5/2018  Narrative: CT BRAIN - WITHOUT CONTRAST INDICATION:   dizziness/lightheadedness  COMPARISON:  None  TECHNIQUE:  CT examination of the brain was performed  In addition to axial images, coronal 2D reformatted images were created and submitted for interpretation  Radiation dose length product (DLP) for this visit:  900 mGy-cm   This examination, like all CT scans performed in the [facility], was performed utilizing techniques to minimize radiation dose exposure, including the use of iterative reconstruction and automated exposure control  IMAGE QUALITY:  Diagnostic  FINDINGS: PARENCHYMA:  No intracranial mass, mass effect or midline shift  No CT signs of acute infarction  No acute parenchymal hemorrhage  Moderate  periventricular and white matter hypodensity seen related to chronic small vessel ischemic changes VENTRICLES AND EXTRA-AXIAL SPACES:  Normal for the patient's age  VISUALIZED ORBITS AND PARANASAL SINUSES:  Unremarkable  CALVARIUM AND EXTRACRANIAL SOFT TISSUES:  Normal      Impression: No acute intracranial hemorrhage seen Moderate periventricular and white matter hypodensity seen related to chronic small vessel ischemic Workstation performed: [id number]     Ct Chest Abdomen Pelvis W Contrast    Result Date: 12/5/2018  Narrative: CT CHEST, ABDOMEN AND PELVIS WITH IV CONTRAST INDICATION:   productive cough, n/v/d starting yesterday  COMPARISON:  None  TECHNIQUE: CT examination of the chest, abdomen and pelvis was performed  Axial, sagittal, and coronal 2D reformatted images were created from the source data and submitted for interpretation  Radiation dose length product (DLP) for this visit:  396 mGy-cm   This examination, like all CT scans performed in the [facility], was performed utilizing techniques to minimize radiation dose exposure, including the use of iterative reconstruction and automated exposure control   IV Contrast:  100 mL of iohexol (OMNIPAQUE) Enteric Contrast: Enteric contrast was administered  FINDINGS: CHEST LUNGS:  Biapical scarring seen No acute consolidation seen Trachea and central bronchial patent A nodule is seen in the left lower lobe measuring about 3 mm in image 84 series 3 A perifissural nodule seen within the left midlung compatible with granuloma PLEURA:  Unremarkable  HEART/GREAT VESSELS:  The ascending aorta is dilated measuring about 4 1 cm Atherosclerotic calcification seen within the coronary arteries MEDIASTINUM AND REMEDIOS:  Unremarkable  CHEST WALL AND LOWER NECK:   There is a right thyroid nodule which measures by 6 9 mm ABDOMEN LIVER/BILIARY TREE:  Unremarkable  GALLBLADDER:  No calcified gallstones  No pericholecystic inflammatory change  SPLEEN:  Unremarkable  PANCREAS:  Unremarkable  ADRENAL GLANDS:  Unremarkable  KIDNEYS/URETERS:  Too small to characterize hypodensities seen within the mid right kidney and in the left kidney statistically represent cysts STOMACH AND BOWEL:  No abnormal dilation of the small bowel loops seen Diverticulosis seen APPENDIX:  No findings to suggest appendicitis  ABDOMINOPELVIC CAVITY:  No ascites or free intraperitoneal air  No lymphadenopathy  VESSELS:  The celiac trunk is patent  The SMA is patent PELVIS REPRODUCTIVE ORGANS:  Unremarkable for patient's age  URINARY BLADDER:  Unremarkable  ABDOMINAL WALL/INGUINAL REGIONS:  There is a right inguinal hernia seen probably direct OSSEOUS STRUCTURES:  No acute fracture or destructive osseous lesion  Impression: No acute consolidation Incidentally detected 3 mm nodule in the left lower lung  Based on current Fleischner Society 2017 Guidelines on incidental pulmonary nodule, no routine follow-up is needed if the patient is considered low risk for lung cancer  If the patient is considered high risk for lung cancer, 12 month follow-up non-contrast chest CT is recommended   Dilated ascending aorta measuring about 4 1 cm consider cardiac echo to evaluate for any evolving heart disease No evidence of bowel obstruction No acute inflammatory stranding Small right inguinal hernia containing colonic loop without evidence of obstruction Workstation performed: [id number]       Objective:     Physical Exam   Constitutional: She is oriented to person, place, and time  She appears well-developed  No distress  Pt is very thin  HENT:   Head: Normocephalic and atraumatic  Right Ear: External ear normal    Left Ear: External ear normal    Mouth/Throat: Oropharynx is clear and moist    Eyes: Pupils are equal, round, and reactive to light  Conjunctivae and EOM are normal  Right eye exhibits no discharge  Left eye exhibits no discharge  Neck: Normal range of motion  Neck supple  Cardiovascular: Normal rate, regular rhythm and normal heart sounds  Exam reveals no friction rub  No murmur heard  Pulmonary/Chest: Effort normal and breath sounds normal  No respiratory distress  She has no wheezes  She has no rales  Abdominal: Soft  Bowel sounds are normal  She exhibits no distension  There is no tenderness  Musculoskeletal: Normal range of motion  She exhibits no edema, tenderness or deformity  Neurological: She is alert and oriented to person, place, and time  No cranial nerve deficit  Pleasantly forgetful  Skin: Skin is warm and dry  No rash noted  She is not diaphoretic  Psychiatric: She has a normal mood and affect  Her behavior is normal  Judgment and thought content normal          Patient Instructions   Discussed all with patient and her stepdaughter  Everyone agrees that Tia Mooring still needs meds for her memory  Since the diarrhea has resolved will continue the aricept cautiously and add cholestyramine at least daily but can have up to 3 times daily  Must have at least 6 glasses of water daily, 3 meals and a snack  You need to gain weight  Call here by Monday  If the diarrhea returns will need to d/c the aricept until normal bowels and then consider another medication   Stressed the importance of hydration and meals  DASIA Davies    Portions of the record may have been created with voice recognition software   Occasional wrong word or "sound a like" substitutions may have occurred due to the inherent limitations of voice recognition software   Read the chart carefully and recognize, using context, where substitutions have occurred

## 2019-01-07 ENCOUNTER — TELEPHONE (OUTPATIENT)
Dept: FAMILY MEDICINE CLINIC | Facility: CLINIC | Age: 79
End: 2019-01-07

## 2019-01-07 DIAGNOSIS — R19.7 DIARRHEA, UNSPECIFIED TYPE: Primary | ICD-10-CM

## 2019-01-07 NOTE — TELEPHONE ENCOUNTER
patient daughter called back  She is aware  she wanted to know how long should she stay off the Aricept to see and if she would be put on another dementia med  I told her to stop at least for a week to see if there are any changes most likely she will will not go back on this medication because that is what causing this  She knows to take them metamucil and do stool sample   They have to come pick cup up and slip to have done

## 2019-01-07 NOTE — TELEPHONE ENCOUNTER
No choice but to stop the aricept and take either the cholestyramine or metamucil  If she is not drinking enough she needs the er  Slip in emr for stool samples

## 2019-01-07 NOTE — TELEPHONE ENCOUNTER
Hayes Skaggs called and wanted to let you know how Faith Patricia is doing with the medication  Carline picked up the metamucil for liris to take, but the medication that was sent to the pharmacy wasn't going to be ready until this week  But Faith Patricia still has diarrhea the and vomited this morning

## 2019-01-14 ENCOUNTER — TELEPHONE (OUTPATIENT)
Dept: FAMILY MEDICINE CLINIC | Facility: CLINIC | Age: 79
End: 2019-01-14

## 2019-01-14 NOTE — TELEPHONE ENCOUNTER
Patients daughter called to follow up on how alessandra patient is doing off the ARICEPT    Patient reports not have any nausea or vomiting, she has had a slight "gurggling" feeling in her stomach but that isn't every night    Patient daughter would like to know what the next step is since she is off her dementia medication    Please advise

## 2019-01-22 ENCOUNTER — TELEPHONE (OUTPATIENT)
Dept: FAMILY MEDICINE CLINIC | Facility: CLINIC | Age: 79
End: 2019-01-22

## 2019-01-22 DIAGNOSIS — F03.90 DEMENTIA WITHOUT BEHAVIORAL DISTURBANCE, UNSPECIFIED DEMENTIA TYPE (HCC): Primary | ICD-10-CM

## 2019-01-22 RX ORDER — RIVASTIGMINE 4.6 MG/24H
1 PATCH, EXTENDED RELEASE TRANSDERMAL DAILY
Qty: 30 PATCH | Refills: 0 | Status: SHIPPED | OUTPATIENT
Start: 2019-01-22 | End: 2019-01-28 | Stop reason: DRUGHIGH

## 2019-01-22 NOTE — TELEPHONE ENCOUNTER
Ok to call pt's son  Script was sent in  Call for problems  Patch has to be removed and replaced daily  F/U here in 3 weeks, but call sooner if stomach issues again

## 2019-01-22 NOTE — TELEPHONE ENCOUNTER
Pt's daughter called , The pt stomach is back to normal     Please call the pts son Muna Adkins when the script has been sent to [facility] ( [phone])

## 2019-01-22 NOTE — TELEPHONE ENCOUNTER
Are you calling in the patch for her dementia in ti the pharmacy    We need to call Gila Glasgow when it is done

## 2019-01-28 ENCOUNTER — APPOINTMENT (OUTPATIENT)
Dept: LAB | Facility: HOSPITAL | Age: 79
End: 2019-01-28
Payer: COMMERCIAL

## 2019-01-28 ENCOUNTER — OFFICE VISIT (OUTPATIENT)
Dept: FAMILY MEDICINE CLINIC | Facility: CLINIC | Age: 79
End: 2019-01-28
Payer: COMMERCIAL

## 2019-01-28 VITALS
RESPIRATION RATE: 17 BRPM | SYSTOLIC BLOOD PRESSURE: 136 MMHG | DIASTOLIC BLOOD PRESSURE: 80 MMHG | WEIGHT: 106.01 LBS | BODY MASS INDEX: 20.02 KG/M2 | OXYGEN SATURATION: 97 % | HEIGHT: 61 IN | TEMPERATURE: 97.6 F | HEART RATE: 80 BPM

## 2019-01-28 DIAGNOSIS — R20.2 PARESTHESIA: ICD-10-CM

## 2019-01-28 DIAGNOSIS — I10 BENIGN HYPERTENSION: ICD-10-CM

## 2019-01-28 DIAGNOSIS — F03.90 DEMENTIA WITHOUT BEHAVIORAL DISTURBANCE, UNSPECIFIED DEMENTIA TYPE (HCC): Primary | ICD-10-CM

## 2019-01-28 LAB
FOLATE SERPL-MCNC: 18.3 NG/ML (ref 3.1–17.5)
VIT B12 SERPL-MCNC: 421 PG/ML (ref 100–900)

## 2019-01-28 PROCEDURE — 82607 VITAMIN B-12: CPT

## 2019-01-28 PROCEDURE — 82746 ASSAY OF FOLIC ACID SERUM: CPT

## 2019-01-28 PROCEDURE — 99214 OFFICE O/P EST MOD 30 MIN: CPT | Performed by: FAMILY MEDICINE

## 2019-01-28 PROCEDURE — 36415 COLL VENOUS BLD VENIPUNCTURE: CPT

## 2019-01-28 RX ORDER — RIVASTIGMINE 9.5 MG/24H
1 PATCH, EXTENDED RELEASE TRANSDERMAL DAILY
Qty: 30 PATCH | Refills: 0 | Status: SHIPPED | OUTPATIENT
Start: 2019-01-28 | End: 2019-02-19 | Stop reason: SDUPTHER

## 2019-01-28 RX ORDER — LISINOPRIL 5 MG/1
5 TABLET ORAL DAILY
Qty: 90 TABLET | Refills: 1 | Status: SHIPPED | OUTPATIENT
Start: 2019-01-28 | End: 2019-06-02 | Stop reason: SDUPTHER

## 2019-01-28 NOTE — PATIENT INSTRUCTIONS
Since she is tolerating the 4 6 mg exelon patch for the next refill we will increase the dose to 9 5 mg  Will follow up here in 4 more weeks  Have the blood test done for the B12 and folate levels but give them the stool specimen at least for the occult blood  If the liquid stool has  You do not need to hand in the blood for over and parasite and for C diff  Plenty of liquids hydrate keep eating  Put some meat on those bones

## 2019-01-28 NOTE — PROGRESS NOTES
Assessment/Plan:     Chronic Problems:  Benign hypertension  BP is acceptable today  Continue current meds  Dementia without behavioral disturbance  Pt is tolerating exelon patch  Will increase at renewal        Visit Diagnosis:  Diagnoses and all orders for this visit:    Dementia without behavioral disturbance, unspecified dementia type  -     rivastigmine (EXELON) 9 5 mg/24 hr TD 24 hr patch; Place 1 patch on the skin daily    Paresthesia  -     Vitamin B12; Future  -     Folate; Future    Benign hypertension  -     lisinopril (ZESTRIL) 5 mg tablet; Take 1 tablet (5 mg total) by mouth daily          Subjective:    Patient ID: Matilde Rivera is a [de-identified] y o  female  Pt is here for f/u on her meds  Stools are back to normal  Started the exelon patch on Tuesday  No nausea, vomiting or diarrhea  Just gas off and on in the am  Not unusual for her  Thinks tums helps with the gas  Pt reports she is feeling well  Did not do the stool samples yet  Pt is removing the patch every day and then replaces the patch  Needs meds renewed  Takes all other meds as directed  No side effects noted  Family has not seen a difference in her memory  The following portions of the patient's history were reviewed and updated as appropriate: allergies, current medications, past family history, past medical history, past social history, past surgical history and problem list     Review of Systems   Constitutional: Negative for chills, diaphoresis, fatigue and fever  HENT: Negative for ear pain, sinus pressure and sore throat  Pt is developing cold sores  Started about 2 weeks ago  Now resolving and using her ointment  Eyes: Negative  Respiratory: Negative for cough, shortness of breath and wheezing  Gastrointestinal: Negative for abdominal pain, blood in stool, constipation, diarrhea, nausea and vomiting  Genitourinary: Negative  Neurological: Positive for numbness (gets tingling in bilateral fingertips   Seen by cardiology for this in the past  )  Negative for dizziness, light-headedness and headaches  Psychiatric/Behavioral: Negative for dysphoric mood  The patient is not nervous/anxious  /80   Pulse 80   Temp 97 6 °F (36 4 °C)   Resp 17   Ht 5' 1" (1 549 m)   Wt 48 1 kg (106 lb 0 2 oz)   SpO2 97%   BMI 20 03 kg/m²   Social History     Social History    Marital status:      Spouse name: N/A    Number of children: N/A    Years of education: N/A     Occupational History    Not on file  Social History Main Topics    Smoking status: Never Smoker    Smokeless tobacco: Never Used    Alcohol use 4 2 oz/week     7 Glasses of wine per week    Drug use: No    Sexual activity: No     Other Topics Concern    Not on file     Social History Narrative    No narrative on file     Past Medical History:   Diagnosis Date    Aortic aneurysm (HCC)     4 1 cm on December 6, 2018    CKD (chronic kidney disease) stage 2, GFR 60-89 ml/min     Colon polyps     Dementia     Hyperlipidemia     Hypertension     Insomnia     Osteoporosis     Uterine fibroid      No family history on file  No past surgical history on file      Current Outpatient Prescriptions:     cholestyramine (QUESTRAN) 4 g packet, Take 1 packet (4 g total) by mouth 3 (three) times a day with meals, Disp: 90 packet, Rfl: 0    FLUAD 0 5 ML CORDELL, , Disp: , Rfl:     lisinopril (ZESTRIL) 5 mg tablet, Take 1 tablet (5 mg total) by mouth daily, Disp: 90 tablet, Rfl: 1    rivastigmine (EXELON) 9 5 mg/24 hr TD 24 hr patch, Place 1 patch on the skin daily, Disp: 30 patch, Rfl: 0    Allergies   Allergen Reactions    Cefazolin      Other reaction(s): Unknown          Lab Review   Orders Only on 01/02/2019   Component Date Value    EXT Creatinine Urine 01/02/2019 27 4    Admission on 12/05/2018, Discharged on 12/06/2018   Component Date Value    Ventricular Rate 12/05/2018 76     Atrial Rate 12/05/2018 76     UT Interval 12/05/2018 170     QRSD Interval 12/05/2018 88     QT Interval 12/05/2018 422     QTC Interval 12/05/2018 474     P Axis 12/05/2018 59     QRS Axis 12/05/2018 -24     T Wave Axis 12/05/2018 73     WBC 12/05/2018 7 37     RBC 12/05/2018 4 86     Hemoglobin 12/05/2018 15 6*    Hematocrit 12/05/2018 45 2     MCV 12/05/2018 93     MCH 12/05/2018 32 1     MCHC 12/05/2018 34 5     RDW 12/05/2018 12 0     MPV 12/05/2018 9 8     Platelets 01/42/9624 335     nRBC 12/05/2018 0     Neutrophils Relative 12/05/2018 86*    Immat GRANS % 12/05/2018 0     Lymphocytes Relative 12/05/2018 10*    Monocytes Relative 12/05/2018 3*    Eosinophils Relative 12/05/2018 0     Basophils Relative 12/05/2018 1     Neutrophils Absolute 12/05/2018 6 31     Immature Grans Absolute 12/05/2018 0 02     Lymphocytes Absolute 12/05/2018 0 75     Monocytes Absolute 12/05/2018 0 20     Eosinophils Absolute 12/05/2018 0 01     Basophils Absolute 12/05/2018 0 08     Protime 12/05/2018 12 0     INR 12/05/2018 0 89     PTT 12/05/2018 25*    Blood Culture 12/05/2018 No Growth After 5 Days   Blood Culture 12/05/2018 No Growth After 5 Days       Rapid Influenza A Ag 12/05/2018 Negative     Rapid Influenza B Ag 12/05/2018 Negative     Color, UA 12/05/2018 Light Yellow     Clarity, UA 12/05/2018 Clear     Specific Gravity, UA 12/05/2018 1 010     pH, UA 12/05/2018 7 5     Leukocytes, UA 12/05/2018 Negative     Nitrite, UA 12/05/2018 Negative     Protein, UA 12/05/2018 Negative     Glucose, UA 12/05/2018 Negative     Ketones, UA 12/05/2018 15 (1+)*    Urobilinogen, UA 12/05/2018 0 2     Bilirubin, UA 12/05/2018 Negative     Blood, UA 12/05/2018 Negative     Troponin I 12/05/2018 <0 02     Sodium 12/05/2018 140     Potassium 12/05/2018 3 2*    Chloride 12/05/2018 99*    CO2 12/05/2018 26     ANION GAP 12/05/2018 15*    BUN 12/05/2018 16     Creatinine 12/05/2018 0 98     Glucose 12/05/2018 132     Calcium 12/05/2018 9 4     eGFR 12/05/2018 55     Total Bilirubin 12/05/2018 1 90*    Bilirubin, Direct 12/05/2018 0 33*    Alkaline Phosphatase 12/05/2018 77     AST 12/05/2018 18     ALT 12/05/2018 21     Total Protein 12/05/2018 8 3*    Albumin 12/05/2018 4 6     Magnesium 12/05/2018 1 9     Lipase 12/05/2018 125     LACTIC ACID 12/05/2018 1 4     INFLU A PCR 12/05/2018 None Detected     INFLU B PCR 12/05/2018 None Detected     RSV PCR 12/05/2018 None Detected     TSH 3RD GENERATON 12/05/2018 3 014     Sodium 12/06/2018 140     Potassium 12/06/2018 5 0     Chloride 12/06/2018 107     CO2 12/06/2018 24     ANION GAP 12/06/2018 9     BUN 12/06/2018 16     Creatinine 12/06/2018 1 12     Glucose 12/06/2018 107     Glucose, Fasting 12/06/2018 107*    Calcium 12/06/2018 8 7     eGFR 12/06/2018 47     Magnesium 12/06/2018 2 0     Phosphorus 12/06/2018 2 9     WBC 12/06/2018 6 49     RBC 12/06/2018 4 08     Hemoglobin 12/06/2018 13 3     Hematocrit 12/06/2018 38 7     MCV 12/06/2018 95     MCH 12/06/2018 32 6     MCHC 12/06/2018 34 4     RDW 12/06/2018 12 3     Platelets 02/74/4359 297     MPV 12/06/2018 9 5      4not applicable     Imaging: No results found  Objective:     Physical Exam   Constitutional: She is oriented to person, place, and time  She appears well-developed and well-nourished  No distress  HENT:   Head: Normocephalic and atraumatic  Right Ear: External ear normal    Left Ear: External ear normal    Mouth/Throat: Oropharynx is clear and moist    Eyes: Pupils are equal, round, and reactive to light  Conjunctivae and EOM are normal  Right eye exhibits no discharge  Left eye exhibits no discharge  Neck: Normal range of motion  Neck supple  Cardiovascular: Normal rate, regular rhythm and normal heart sounds  Exam reveals no friction rub  No murmur heard  Pulmonary/Chest: Effort normal and breath sounds normal  No respiratory distress  She has no wheezes   She has no rales  She exhibits no tenderness  Abdominal: Soft  Bowel sounds are normal  There is no tenderness  Musculoskeletal: Normal range of motion  She exhibits no edema, tenderness or deformity  Lymphadenopathy:     She has no cervical adenopathy  Neurological: She is alert and oriented to person, place, and time  No cranial nerve deficit  Skin: Skin is warm and dry  No rash noted  She is not diaphoretic  Healing cold sore right lower lip  Pt refuses valtrex  Psychiatric: She has a normal mood and affect  Her behavior is normal  Judgment and thought content normal          Patient Instructions   Since she is tolerating the 4 6 mg exelon patch for the next refill we will increase the dose to 9 5 mg  Will follow up here in 4 more weeks  Have the blood test done for the B12 and folate levels but give them the stool specimen at least for the occult blood  If the liquid stool has  You do not need to hand in the blood for over and parasite and for C diff  Plenty of liquids hydrate keep eating  Put some meat on those bones  DASIA Davies    Portions of the record may have been created with voice recognition software   Occasional wrong word or "sound a like" substitutions may have occurred due to the inherent limitations of voice recognition software   Read the chart carefully and recognize, using context, where substitutions have occurred

## 2019-01-31 ENCOUNTER — TELEPHONE (OUTPATIENT)
Dept: FAMILY MEDICINE CLINIC | Facility: CLINIC | Age: 79
End: 2019-01-31

## 2019-01-31 NOTE — TELEPHONE ENCOUNTER
----- Message from [address] [place] [address]  sent at 1/31/2019 10:00 AM EST -----  No that is fine

## 2019-02-07 NOTE — ASSESSMENT & PLAN NOTE
Blood pressure elevated on arrival at 183/88  Now 165/76  Incidentally found an ascending aortic aneurysm at 4 1 cm    · Ideally need to lower blood pressure in light of aortic aneurysm  · Follow-up echocardiogram  · Continue ACEi  · Hold Norvasc Inpatient Rehabilitation Functional Measures Assessment    Functional Measures  ALBERTO Eating:  [facility] Grooming: [facility] Bathing:  [facility] Upper Body Dressing:  [facility] Lower Body Dressing:  [facility] Toileting:  [facility] Bladder Management  Level of Assistance:  [place]  Frequency/Number of Accidents this Shift:  [facility] Bowel Management  Level of Assistance: [place]  Frequency/Number of Accidents this Shift: [facility] Bed/Chair/Wheelchair Transfer:  [facility] Toilet Transfer:  [facility] Tub/Shower Transfer:  [place]    Previously Documented Mode of Locomotion at Discharge: Field  ALBERTO Expected Mode of Locomotion at Discharge: [facility] Walk/Wheelchair:  [facility] Stairs:  [facility] Comprehension:  Auditory comprehension is the usual mode. Patient does not  comprehend complex/abstract information in their primary language without  assistance from a helper. Comprehension Score = 5, Supervision. Patient  comprehends basic daily needs or ideas greater than 90% of the time. Patient  requires stand by/rare prompting. No assistive devices were required.  ALBERTO Expression:  Vocal expression is the usual mode. Patient does not express  complex/abstract information in their primary language without a helper.  Expression Score = 4, Minimal Prompting. Patient expresses basic daily needs or  ideas 75-90% of the time.  Patient requires minimal/occasional prompting. No  assistive devices were required.  ALBERTO Social Interaction:  Social Interaction Score = 5, Supervision.  Patient may  require encouragement to initiate participation. Patient requires directing only  under stressful or unfamiliar conditions, but less than 10% of the time, for the  following behavior(s):  ALBERTO Problem Solving:  Activity was not observed.  ALBERTO Memory:  Memory Score = 3, Moderate Prompting. Patient recognizes and  remembers 50-74% of the time. Patient requires moderate/some prompting  for  memory for the  following:    Therapy Mode Minutes  Occupational Therapy: [facility]  Physical Therapy: [facility]  Speech Language Pathology:  [facility]    Signed by: Kayleen Draper RN

## 2019-02-15 ENCOUNTER — TELEPHONE (OUTPATIENT)
Dept: OTHER | Facility: OTHER | Age: 79
End: 2019-02-15

## 2019-02-15 ENCOUNTER — TELEPHONE (OUTPATIENT)
Dept: FAMILY MEDICINE CLINIC | Facility: CLINIC | Age: 79
End: 2019-02-15

## 2019-02-15 NOTE — TELEPHONE ENCOUNTER
The service called in regards to [facility], you were in a room please call them back

## 2019-02-15 NOTE — TELEPHONE ENCOUNTER
Tali Hillman called in regards to Michael  She will like to speak with you when in regards to the problem she is having

## 2019-02-15 NOTE — TELEPHONE ENCOUNTER
Brayan Louis called into the service to speak with Cam Dates  Called the office  Shira Ro  Is in with  A patient  and will call her  back

## 2019-02-18 ENCOUNTER — TELEPHONE (OUTPATIENT)
Dept: FAMILY MEDICINE CLINIC | Facility: CLINIC | Age: 79
End: 2019-02-18

## 2019-02-18 NOTE — TELEPHONE ENCOUNTER
Joe Armijo called again  Another message left on machine  If she calls back would you please let me know and I will speak with her?  We have been playing phone tag  ty

## 2019-02-18 NOTE — TELEPHONE ENCOUNTER
Spoke with Mora Aguilar who has conflicting information re: side effects from exelon  Not clear if pt has had diarrhea and vomiting from this as well  Will keep f u appt to discuss all

## 2019-02-19 ENCOUNTER — OFFICE VISIT (OUTPATIENT)
Dept: FAMILY MEDICINE CLINIC | Facility: CLINIC | Age: 79
End: 2019-02-19
Payer: COMMERCIAL

## 2019-02-19 VITALS
BODY MASS INDEX: 20.09 KG/M2 | DIASTOLIC BLOOD PRESSURE: 70 MMHG | RESPIRATION RATE: 14 BRPM | SYSTOLIC BLOOD PRESSURE: 130 MMHG | TEMPERATURE: 98.9 F | WEIGHT: 106.4 LBS | HEIGHT: 61 IN | HEART RATE: 72 BPM | OXYGEN SATURATION: 98 %

## 2019-02-19 DIAGNOSIS — F03.90 DEMENTIA WITHOUT BEHAVIORAL DISTURBANCE, UNSPECIFIED DEMENTIA TYPE (HCC): ICD-10-CM

## 2019-02-19 DIAGNOSIS — L85.3 DRY SKIN: ICD-10-CM

## 2019-02-19 DIAGNOSIS — I10 BENIGN HYPERTENSION: Primary | ICD-10-CM

## 2019-02-19 PROCEDURE — 3075F SYST BP GE 130 - 139MM HG: CPT | Performed by: FAMILY MEDICINE

## 2019-02-19 PROCEDURE — 99214 OFFICE O/P EST MOD 30 MIN: CPT | Performed by: FAMILY MEDICINE

## 2019-02-19 PROCEDURE — 3078F DIAST BP <80 MM HG: CPT | Performed by: FAMILY MEDICINE

## 2019-02-19 RX ORDER — RIVASTIGMINE 9.5 MG/24H
1 PATCH, EXTENDED RELEASE TRANSDERMAL DAILY
Qty: 30 PATCH | Refills: 3 | Status: SHIPPED | OUTPATIENT
Start: 2019-02-19 | End: 2019-03-11 | Stop reason: SDUPTHER

## 2019-02-19 NOTE — PROGRESS NOTES
Assessment/Plan:     Chronic Problems:  Benign hypertension  At home blood pressures are sometimes on the low side however patient has no complaints of lightheadedness  Will have her continue to monitor the blood pressures at home record the numbers and bring to follow-up with her machine in 2 months  Dementia without behavioral disturbance  Seems to be doing well on her Exelon patch but will  continue to monitor for vomiting and diarrhea      Visit Diagnosis:  Diagnoses and all orders for this visit:    Benign hypertension    Dementia without behavioral disturbance, unspecified dementia type  -     rivastigmine (EXELON) 9 5 mg/24 hr TD 24 hr patch; Place 1 patch on the skin daily    Dry skin  Comments:  Advised not to use baby oral anywhere near where the patch will go on the skin  Okay to clean the area with alcohol before applying patch          Subjective:    Patient ID: Keesha Brasher is a [de-identified] y o  female  Pt is here for f/u on her meds ac by her step daughter  Pt states she has a lot of gas at night with some episodes of vomiting and diarrhea early on  No further diarrhea or vomiting  Step daughter states pt has always had problems with gas  Pt feels she is tolerating the patch  Checking bp's at home  Notices that the numbers are coming down  Brought her numbers with her  Has her booklet with her that reports her blood pressures at home are 112/73, 109/70, 153/87, 109/72, 97/70  Pt was not lightheaded with these bp's  Takes all other meds as directed  No side effects noted  The following portions of the patient's history were reviewed and updated as appropriate: allergies, current medications, past family history, past medical history, past social history, past surgical history and problem list     Review of Systems   Constitutional: Negative for chills, diaphoresis, fatigue and fever  HENT: Negative  Eyes: Negative  Respiratory: Negative for cough, shortness of breath and wheezing  Cardiovascular: Negative for chest pain and palpitations  Gastrointestinal: Negative for constipation, diarrhea, nausea and vomiting  Always with excess gas  Takes tums for this  Pt reports she is drinking 4 to 5 glasses of water daily,  Genitourinary: Negative for dysuria, frequency and urgency  Musculoskeletal: Negative  Skin:        Pt c/o dry skin  Neurological: Negative for dizziness, light-headedness and headaches  Psychiatric/Behavioral: Negative for dysphoric mood  The patient is not nervous/anxious  Family stress  Artaniya Stapleton states she is starting to fill out a little better and notices that the memory is better/ Office of aging was in and now has meals on wheels  /70   Pulse 72   Temp 98 9 °F (37 2 °C)   Resp 14   Ht 5' 1" (1 549 m)   Wt 48 3 kg (106 lb 6 4 oz)   SpO2 98%   BMI 20 10 kg/m²   Social History     Socioeconomic History    Marital status:      Spouse name: Not on file    Number of children: Not on file    Years of education: Not on file    Highest education level: Not on file   Occupational History    Not on file   Social Needs    Financial resource strain: Not on file    Food insecurity:     Worry: Not on file     Inability: Not on file    Transportation needs:     Medical: Not on file     Non-medical: Not on file   Tobacco Use    Smoking status: Never Smoker    Smokeless tobacco: Never Used   Substance and Sexual Activity    Alcohol use:  Yes     Alcohol/week: 4 2 oz     Types: 7 Glasses of wine per week    Drug use: No    Sexual activity: Never   Lifestyle    Physical activity:     Days per week: Not on file     Minutes per session: Not on file    Stress: Not on file   Relationships    Social connections:     Talks on phone: Not on file     Gets together: Not on file     Attends Buddhism service: Not on file     Active member of club or organization: Not on file     Attends meetings of clubs or organizations: Not on file     Relationship status: Not on file    Intimate partner violence:     Fear of current or ex partner: Not on file     Emotionally abused: Not on file     Physically abused: Not on file     Forced sexual activity: Not on file   Other Topics Concern    Not on file   Social History Narrative    Not on file     Past Medical History:   Diagnosis Date    Aortic aneurysm ([address] )     4 1 cm on December 6, 2018    CKD (chronic kidney disease) stage 2, GFR 60-89 ml/min     Colon polyps     Dementia     Hyperlipidemia     Hypertension     Insomnia     Osteoporosis     Uterine fibroid      History reviewed  No pertinent family history  History reviewed  No pertinent surgical history  Current Outpatient Medications:     cholestyramine (QUESTRAN) 4 g packet, Take 1 packet (4 g total) by mouth 3 (three) times a day with meals, Disp: 90 packet, Rfl: 0    lisinopril (ZESTRIL) 5 mg tablet, Take 1 tablet (5 mg total) by mouth daily, Disp: 90 tablet, Rfl: 1    rivastigmine (EXELON) 9 5 mg/24 hr TD 24 hr patch, Place 1 patch on the skin daily, Disp: 30 patch, Rfl: 3    FLUAD 0 5 ML CORDELL, , Disp: , Rfl:     Allergies   Allergen Reactions    Cefazolin      Other reaction(s): Unknown          Lab Review   Appointment on 01/28/2019   Component Date Value    Vitamin B-12 01/28/2019 421     Folate 01/28/2019 18 3*   Orders Only on 01/02/2019   Component Date Value    EXT Creatinine Urine 01/02/2019 27 4         Imaging: No results found  Objective:     Physical Exam   Constitutional: She is oriented to person, place, and time  She appears well-developed and well-nourished  No distress  HENT:   Head: Normocephalic and atraumatic  Right Ear: External ear normal    Left Ear: External ear normal    Mouth/Throat: Oropharynx is clear and moist    Eyes: Pupils are equal, round, and reactive to light  Conjunctivae and EOM are normal  Right eye exhibits no discharge  Left eye exhibits no discharge     Neck: Normal range of motion  Neck supple  Cardiovascular: Normal rate, regular rhythm and normal heart sounds  Exam reveals no friction rub  No murmur heard  Pulmonary/Chest: Effort normal and breath sounds normal  No respiratory distress  She has no wheezes  She has no rales  She exhibits no tenderness  Abdominal: Soft  Bowel sounds are normal  There is no tenderness  Musculoskeletal: Normal range of motion  She exhibits no edema, tenderness or deformity  Neurological: She is alert and oriented to person, place, and time  No cranial nerve deficit  Pt is more alert with better recall today  Skin: Skin is warm and dry  No rash noted  She is not diaphoretic  Psychiatric: She has a normal mood and affect  Her behavior is normal  Judgment and thought content normal          Patient Instructions   Discussed all with patient and her [place]  Although she seems to have had some problems initially with her Exelon patch she seems to be doing well now without nausea or vomiting  According to Stacey Coreas she is also had a history of persistent problems with gas  I would suggest instead of times to use either Gas-X or Phazyme  Continue the it patches for now but do not use any oil over the areas where the patch has to be applied and if you do clean the area with alcohol and let it dry  Continue to monitor the blood pressures at home record the numbers and let me know if your numbers remain less than 100 on top  Bring your list to the follow-up appointment in 2 months and your machine and we will check how you are checking your own blood pressure  Still advised patient to try to put some meat on her bones as she gained 4 oz since the last visit    Will follow up in 2 months and will also recheck the mental status at that time      DASIA Correia    Portions of the record may have been created with voice recognition software   Occasional wrong word or "sound a like" substitutions may have occurred due to the inherent limitations of voice recognition software   Read the chart carefully and recognize, using context, where substitutions have occurred

## 2019-02-19 NOTE — PATIENT INSTRUCTIONS
Discussed all with patient and her [place]  Although she seems to have had some problems initially with her Exelon patch she seems to be doing well now without nausea or vomiting  According to Aby Gonzalez she is also had a history of persistent problems with gas  I would suggest instead of times to use either Gas-X or Phazyme  Continue the it patches for now but do not use any oil over the areas where the patch has to be applied and if you do clean the area with alcohol and let it dry  Continue to monitor the blood pressures at home record the numbers and let me know if your numbers remain less than 100 on top  Bring your list to the follow-up appointment in 2 months and your machine and we will check how you are checking your own blood pressure  Still advised patient to try to put some meat on her bones as she gained 4 oz since the last visit    Will follow up in 2 months and will also recheck the mental status at that time

## 2019-02-19 NOTE — ASSESSMENT & PLAN NOTE
At home blood pressures are sometimes on the low side however patient has no complaints of lightheadedness  Will have her continue to monitor the blood pressures at home record the numbers and bring to follow-up with her machine in 2 months

## 2019-03-07 ENCOUNTER — OFFICE VISIT (OUTPATIENT)
Dept: FAMILY MEDICINE CLINIC | Facility: CLINIC | Age: 79
End: 2019-03-07
Payer: COMMERCIAL

## 2019-03-07 VITALS
HEIGHT: 61 IN | BODY MASS INDEX: 19.98 KG/M2 | WEIGHT: 105.8 LBS | SYSTOLIC BLOOD PRESSURE: 122 MMHG | DIASTOLIC BLOOD PRESSURE: 74 MMHG

## 2019-03-07 DIAGNOSIS — Z13.5 SCREENING FOR GLAUCOMA: ICD-10-CM

## 2019-03-07 DIAGNOSIS — Z12.11 SCREEN FOR COLON CANCER: ICD-10-CM

## 2019-03-07 DIAGNOSIS — Z00.00 MEDICARE ANNUAL WELLNESS VISIT, SUBSEQUENT: ICD-10-CM

## 2019-03-07 DIAGNOSIS — Z23 ENCOUNTER FOR IMMUNIZATION: ICD-10-CM

## 2019-03-07 DIAGNOSIS — Z78.0 POSTMENOPAUSAL: ICD-10-CM

## 2019-03-07 PROCEDURE — 1170F FXNL STATUS ASSESSED: CPT | Performed by: FAMILY MEDICINE

## 2019-03-07 PROCEDURE — 1125F AMNT PAIN NOTED PAIN PRSNT: CPT | Performed by: FAMILY MEDICINE

## 2019-03-07 PROCEDURE — G0439 PPPS, SUBSEQ VISIT: HCPCS | Performed by: FAMILY MEDICINE

## 2019-03-07 PROCEDURE — 1160F RVW MEDS BY RX/DR IN RCRD: CPT | Performed by: FAMILY MEDICINE

## 2019-03-07 NOTE — PROGRESS NOTES
Assessment and Plan:    Problem List Items Addressed This Visit     None      Visit Diagnoses     Medicare annual wellness visit, subsequent        Screen for colon cancer        Relevant Orders    Occult Blood, Fecal Immunochemical    Screening for glaucoma        Relevant Orders    Ambulatory referral to Ophthalmology    Postmenopausal        Relevant Orders    DXA bone density spine hip and pelvis    Encounter for immunization        Relevant Medications    Zoster Vac Recomb Adjuvanted 50 MCG/0 5ML SUSR        Health Maintenance Due   Topic Date Due    Medicare Annual Wellness Visit (AWV)  1940         HPI:  Travis Gibson is a 78 y o  female here for her Subsequent Wellness Visit  Patient Active Problem List   Diagnosis    Benign hypertension    Dementia without behavioral disturbance    Nausea & vomiting    Dizziness    Nodule of left lung    Ascending aortic aneurysm (HCC)    CKD (chronic kidney disease) stage 3, GFR 30-59 ml/min (HCC)     Past Medical History:   Diagnosis Date    Aortic aneurysm (HCC)     4 1 cm on December 6, 2018    CKD (chronic kidney disease) stage 2, GFR 60-89 ml/min     Colon polyps     Dementia     Hyperlipidemia     Hypertension     Insomnia     Osteoporosis     Uterine fibroid      History reviewed  No pertinent surgical history  History reviewed  No pertinent family history    Social History     Tobacco Use   Smoking Status Never Smoker   Smokeless Tobacco Never Used     Social History     Substance and Sexual Activity   Alcohol Use Yes    Alcohol/week: 4 2 oz    Types: 7 Glasses of wine per week      Social History     Substance and Sexual Activity   Drug Use No       Current Outpatient Medications   Medication Sig Dispense Refill    cholestyramine (QUESTRAN) 4 g packet Take 1 packet (4 g total) by mouth 3 (three) times a day with meals 90 packet 0    FLUAD 0 5 ML CORDELL       lisinopril (ZESTRIL) 5 mg tablet Take 1 tablet (5 mg total) by mouth daily 90 tablet 1    rivastigmine (EXELON) 9 5 mg/24 hr TD 24 hr patch Place 1 patch on the skin daily 30 patch 3    Zoster Vac Recomb Adjuvanted 50 MCG/0 5ML SUSR Inject 50 mcg into a muscle once for 1 dose And repeat in 2 to 6 months 1 each 1     No current facility-administered medications for this visit  Allergies   Allergen Reactions    Cefazolin      Other reaction(s): Unknown     Immunization History   Administered Date(s) Administered    Influenza, high dose seasonal 0 5 mL 09/13/2018    Pneumococcal Conjugate 13-Valent 09/12/2015    Pneumococcal Polysaccharide PPV23 11/08/2007    Tdap 11/16/2009    Zoster 07/30/2008       Patient Care Team:  Judi Hill as PCP - General (Family Medicine)    Medicare Screening Tests and Risk Assessments:  Zachariah Figueroa is here for her Subsequent Wellness visit  Health Risk Assessment:  Patient rates overall health as very good  Patient feels that their physical health rating is Same  Eyesight was rated as Same  Hearing was rated as Same  Patient feels that their emotional and mental health rating is Same  Pain experienced by patient in the last 7 days has been None  Patient states that she has experienced no weight loss or gain in last 6 months  Emotional/Mental Health:  Patient has been feeling nervous/anxious  PHQ-9 Depression Screening:    Frequency of the following problems over the past two weeks:      1  Little interest or pleasure in doing things: 0 - not at all      2  Feeling down, depressed, or hopeless: 0 - not at all  PHQ-2 Score: 0          Broken Bones/Falls: Fall Risk Assessment:    In the past year, patient has experienced: No history of falling in past year          Bladder/Bowel:  Patient has leaked urine accidently in the last six months  Patient reports loss of bowel control  Immunizations:  Patient has had a flu vaccination within the last year  Patient has received a pneumonia shot  Patient has received a shingles shot  Patient has received tetanus/diphtheria shot  Home Safety:  Patient has trouble with stairs inside or outside of their home  Patient currently reports that there are no safety hazards present in home, working smoke alarms, working carbon monoxide detectors  Preventative Screenings:   Breast cancer screening performed, colon cancer screen completed, cholesterol screen completed, glaucoma eye exam completed,     Nutrition:  Current diet: Regular with servings of the following:    Medications:  Patient is currently taking over-the-counter supplements  Patient is able to manage medications  Lifestyle Choices:  Patient reports no tobacco use  Patient has not smoked or used tobacco in the past   Patient reports no alcohol use  Patient drives a vehicle  Patient wears seat belt  Activities of Daily Living:  Can get out of bed by his or her self, able to dress self, able to make own meals, able to do own shopping, able to bathe self, can do own laundry/housekeeping, can manage own money, pay bills and track expenses    Previous Hospitalizations:  Hospitalization or ED visit in past 12 months  Number of hospitalizations within the last year: 1-2        Advanced Directives:  Patient has decided on a power of   Patient has spoken to designated power of   Patient has completed advanced directive  Preventative Screening/Counseling:      Cardiovascular:      General: Risks and Benefits Discussed and Screening Current          Colorectal Cancer:      General: Risks and Benefits Discussed      Due for studies: Fecal Occult Blood      Comments: Pt gave a non adequate specimen and it will be repeated           Breast Cancer:      General: Risks and Benefits Discussed and Screening Current          Cervical Cancer:      General: Risks and Benefits Discussed and Screening Not Indicated          Osteoporosis:      General: Risks and Benefits Discussed      Due for studies: DXA Appendicular          AAA:      General: Screening Not Indicated          Glaucoma:      General: Risks and Benefits Discussed      Referrals: Ophthalmology          HIV:      General: Risks and Benefits Discussed and Screening Not Indicated          Hepatitis C:      General: Risks and Benefits Discussed and Screening Not Indicated        Advanced Directives:   Patient has living will for healthcare, has durable POA for healthcare, patient has an advanced directive  Information on ACP and/or AD provided  5 wishes given  End of life assessment reviewed with patient  Provider does not agree with end of life deisions   Additional Comments: Pt would want resuscitation even if there were no hope for recovery,     Immunizations:      Influenza: Risks & Benefits Discussed and Influenza UTD This Year      Pneumococcal: Risks & Benefits Discussed and Lifetime Vaccine Completed      Shingrix: Risks & Benefits Discussed and Shingrix Vaccine Needed Today      Hepatitis B (Low risk patients): Prevention Counseling and Series Not Indicated      Zostavax: Risks & Benefits Discussed and Zostavax Vaccine UTD      TDAP: Risks & Benefits Discussed and Tdap Vaccine UTD

## 2019-03-11 DIAGNOSIS — F03.90 DEMENTIA WITHOUT BEHAVIORAL DISTURBANCE, UNSPECIFIED DEMENTIA TYPE (HCC): ICD-10-CM

## 2019-03-11 RX ORDER — RIVASTIGMINE 9.5 MG/24H
PATCH, EXTENDED RELEASE TRANSDERMAL
Qty: 30 PATCH | Refills: 3 | Status: SHIPPED | OUTPATIENT
Start: 2019-03-11 | End: 2019-04-15 | Stop reason: SINTOL

## 2019-03-26 ENCOUNTER — HOSPITAL ENCOUNTER (OUTPATIENT)
Dept: MAMMOGRAPHY | Facility: CLINIC | Age: 79
Discharge: HOME | End: 2019-03-26
Payer: COMMERCIAL

## 2019-03-26 DIAGNOSIS — Z78.0 POSTMENOPAUSAL: ICD-10-CM

## 2019-03-26 PROCEDURE — 77080 DXA BONE DENSITY AXIAL: CPT

## 2019-03-27 ENCOUNTER — HOSPITAL ENCOUNTER (EMERGENCY)
Facility: HOSPITAL | Age: 79
Discharge: HOME | End: 2019-03-27
Attending: EMERGENCY MEDICINE | Admitting: EMERGENCY MEDICINE
Payer: COMMERCIAL

## 2019-03-27 ENCOUNTER — APPOINTMENT (EMERGENCY)
Dept: CT IMAGING | Facility: HOSPITAL | Age: 79
End: 2019-03-27
Payer: COMMERCIAL

## 2019-03-27 ENCOUNTER — TELEPHONE (OUTPATIENT)
Dept: FAMILY MEDICINE CLINIC | Facility: CLINIC | Age: 79
End: 2019-03-27

## 2019-03-27 VITALS
OXYGEN SATURATION: 100 % | DIASTOLIC BLOOD PRESSURE: 73 MMHG | RESPIRATION RATE: 18 BRPM | SYSTOLIC BLOOD PRESSURE: 121 MMHG | TEMPERATURE: 98.7 F | BODY MASS INDEX: 22.04 KG/M2 | HEART RATE: 80 BPM | WEIGHT: 116.62 LBS

## 2019-03-27 DIAGNOSIS — R11.2 NAUSEA VOMITING AND DIARRHEA: Primary | ICD-10-CM

## 2019-03-27 DIAGNOSIS — R19.7 NAUSEA VOMITING AND DIARRHEA: Primary | ICD-10-CM

## 2019-03-27 LAB
ALBUMIN SERPL BCP-MCNC: 4.6 G/DL (ref 3.5–5)
ALP SERPL-CCNC: 62 U/L (ref 46–116)
ALT SERPL W P-5'-P-CCNC: 17 U/L (ref 12–78)
ANION GAP SERPL CALCULATED.3IONS-SCNC: 12 MMOL/L (ref 4–13)
AST SERPL W P-5'-P-CCNC: 12 U/L (ref 5–45)
BASOPHILS # BLD AUTO: 0.1 THOUSANDS/ΜL (ref 0–0.1)
BASOPHILS NFR BLD AUTO: 1 % (ref 0–1)
BILIRUB SERPL-MCNC: 1 MG/DL (ref 0.2–1)
BUN SERPL-MCNC: 20 MG/DL (ref 5–25)
CALCIUM SERPL-MCNC: 9.7 MG/DL (ref 8.3–10.1)
CHLORIDE SERPL-SCNC: 100 MMOL/L (ref 100–108)
CO2 SERPL-SCNC: 27 MMOL/L (ref 21–32)
CREAT SERPL-MCNC: 1.08 MG/DL (ref 0.6–1.3)
EOSINOPHIL # BLD AUTO: 0.09 THOUSAND/ΜL (ref 0–0.61)
EOSINOPHIL NFR BLD AUTO: 1 % (ref 0–6)
ERYTHROCYTE [DISTWIDTH] IN BLOOD BY AUTOMATED COUNT: 11.9 % (ref 11.6–15.1)
GFR SERPL CREATININE-BSD FRML MDRD: 48 ML/MIN/1.73SQ M
GLUCOSE SERPL-MCNC: 117 MG/DL (ref 65–140)
HCT VFR BLD AUTO: 40.9 % (ref 34.8–46.1)
HGB BLD-MCNC: 14.2 G/DL (ref 11.5–15.4)
IMM GRANULOCYTES # BLD AUTO: 0.02 THOUSAND/UL (ref 0–0.2)
IMM GRANULOCYTES NFR BLD AUTO: 0 % (ref 0–2)
LACTATE SERPL-SCNC: 1 MMOL/L (ref 0.5–2)
LIPASE SERPL-CCNC: 892 U/L (ref 73–393)
LYMPHOCYTES # BLD AUTO: 1.65 THOUSANDS/ΜL (ref 0.6–4.47)
LYMPHOCYTES NFR BLD AUTO: 23 % (ref 14–44)
MCH RBC QN AUTO: 32.4 PG (ref 26.8–34.3)
MCHC RBC AUTO-ENTMCNC: 34.7 G/DL (ref 31.4–37.4)
MCV RBC AUTO: 93 FL (ref 82–98)
MONOCYTES # BLD AUTO: 0.41 THOUSAND/ΜL (ref 0.17–1.22)
MONOCYTES NFR BLD AUTO: 6 % (ref 4–12)
NEUTROPHILS # BLD AUTO: 4.94 THOUSANDS/ΜL (ref 1.85–7.62)
NEUTS SEG NFR BLD AUTO: 69 % (ref 43–75)
NRBC BLD AUTO-RTO: 0 /100 WBCS
PLATELET # BLD AUTO: 356 THOUSANDS/UL (ref 149–390)
PMV BLD AUTO: 10.2 FL (ref 8.9–12.7)
POTASSIUM SERPL-SCNC: 3.7 MMOL/L (ref 3.5–5.3)
PROT SERPL-MCNC: 8 G/DL (ref 6.4–8.2)
RBC # BLD AUTO: 4.38 MILLION/UL (ref 3.81–5.12)
SODIUM SERPL-SCNC: 139 MMOL/L (ref 136–145)
TROPONIN I SERPL-MCNC: <0.02 NG/ML
WBC # BLD AUTO: 7.21 THOUSAND/UL (ref 4.31–10.16)

## 2019-03-27 PROCEDURE — 85025 COMPLETE CBC W/AUTO DIFF WBC: CPT | Performed by: NURSE PRACTITIONER

## 2019-03-27 PROCEDURE — 83690 ASSAY OF LIPASE: CPT | Performed by: NURSE PRACTITIONER

## 2019-03-27 PROCEDURE — 96360 HYDRATION IV INFUSION INIT: CPT

## 2019-03-27 PROCEDURE — 74177 CT ABD & PELVIS W/CONTRAST: CPT

## 2019-03-27 PROCEDURE — 80053 COMPREHEN METABOLIC PANEL: CPT | Performed by: NURSE PRACTITIONER

## 2019-03-27 PROCEDURE — 93005 ELECTROCARDIOGRAM TRACING: CPT

## 2019-03-27 PROCEDURE — 36415 COLL VENOUS BLD VENIPUNCTURE: CPT | Performed by: NURSE PRACTITIONER

## 2019-03-27 PROCEDURE — 99285 EMERGENCY DEPT VISIT HI MDM: CPT

## 2019-03-27 PROCEDURE — 83605 ASSAY OF LACTIC ACID: CPT | Performed by: NURSE PRACTITIONER

## 2019-03-27 PROCEDURE — 84484 ASSAY OF TROPONIN QUANT: CPT | Performed by: NURSE PRACTITIONER

## 2019-03-27 RX ORDER — ONDANSETRON 2 MG/ML
1 INJECTION INTRAMUSCULAR; INTRAVENOUS ONCE
Status: COMPLETED | OUTPATIENT
Start: 2019-03-27 | End: 2019-03-27

## 2019-03-27 RX ORDER — ONDANSETRON 2 MG/ML
INJECTION INTRAMUSCULAR; INTRAVENOUS
Status: DISCONTINUED
Start: 2019-03-27 | End: 2019-03-27 | Stop reason: HOSPADM

## 2019-03-27 RX ORDER — DICYCLOMINE HCL 20 MG
20 TABLET ORAL ONCE
Status: COMPLETED | OUTPATIENT
Start: 2019-03-27 | End: 2019-03-27

## 2019-03-27 RX ORDER — MAGNESIUM HYDROXIDE/ALUMINUM HYDROXICE/SIMETHICONE 120; 1200; 1200 MG/30ML; MG/30ML; MG/30ML
30 SUSPENSION ORAL ONCE
Status: COMPLETED | OUTPATIENT
Start: 2019-03-27 | End: 2019-03-27

## 2019-03-27 RX ORDER — ONDANSETRON 4 MG/1
4 TABLET, ORALLY DISINTEGRATING ORAL EVERY 8 HOURS PRN
Qty: 15 TABLET | Refills: 0 | Status: SHIPPED | OUTPATIENT
Start: 2019-03-27 | End: 2019-07-26 | Stop reason: ALTCHOICE

## 2019-03-27 RX ADMIN — DICYCLOMINE HYDROCHLORIDE 20 MG: 20 TABLET ORAL at 19:35

## 2019-03-27 RX ADMIN — SODIUM CHLORIDE 1000 ML: 9 INJECTION, SOLUTION INTRAVENOUS at 18:43

## 2019-03-27 RX ADMIN — ALUMINUM HYDROXIDE, MAGNESIUM HYDROXIDE, AND SIMETHICONE 30 ML: 200; 200; 20 SUSPENSION ORAL at 19:35

## 2019-03-27 RX ADMIN — IODIXANOL 100 ML: 320 INJECTION, SOLUTION INTRAVASCULAR at 19:44

## 2019-03-27 NOTE — ED PROVIDER NOTES
History  Chief Complaint   Patient presents with    Vomiting     Pt brought EMS for evaluation of nausea, vomiting, and diarhea starting 1500 today  This is a 53-year-old female who presents here with her daughter with reports of nausea and vomiting and diarrhea that started around 3 o'clock today  She denies any focal abdominal tenderness  She denies any fever chills  She has had episodes of vomiting in the past   She has not been able to keep anything down since it started at 3 o'clock today  She denies any recent exposure  Denies any bad food  No history of obstruction or previous abdominal surgeries  Prior to Admission Medications   Prescriptions Last Dose Informant Patient Reported? Taking? FLUAD 0 5 ML CORDELL   Yes No   cholestyramine (QUESTRAN) 4 g packet   No No   Sig: Take 1 packet (4 g total) by mouth 3 (three) times a day with meals   lisinopril (ZESTRIL) 5 mg tablet   No No   Sig: Take 1 tablet (5 mg total) by mouth daily   rivastigmine (EXELON) 9 5 mg/24 hr TD 24 hr patch   No No   Sig: APPLY ONE PATCH TO THE SKIN EVERY DAY      Facility-Administered Medications: None       Past Medical History:   Diagnosis Date    Aortic aneurysm (HCC)     4 1 cm on December 6, 2018    CKD (chronic kidney disease) stage 2, GFR 60-89 ml/min     Colon polyps     Dementia     Hyperlipidemia     Hypertension     Insomnia     Osteopenia with elevated frax score     Osteoporosis     Uterine fibroid        History reviewed  No pertinent surgical history  History reviewed  No pertinent family history  I have reviewed and agree with the history as documented  Social History     Tobacco Use    Smoking status: Never Smoker    Smokeless tobacco: Never Used   Substance Use Topics    Alcohol use: Yes     Alcohol/week: 4 2 oz     Types: 7 Glasses of wine per week    Drug use: No        Review of Systems   Constitutional: Negative for activity change, fatigue and fever     HENT: Negative for congestion, ear pain, rhinorrhea and sore throat  Eyes: Negative  Respiratory: Negative for cough, shortness of breath and wheezing  Gastrointestinal: Positive for diarrhea, nausea and vomiting  Negative for abdominal pain  Endocrine: Negative  Genitourinary: Negative for difficulty urinating, dyspareunia, dysuria, flank pain, frequency, menstrual problem, pelvic pain, urgency, vaginal bleeding, vaginal discharge and vaginal pain  Musculoskeletal: Negative for arthralgias and myalgias  Skin: Negative for color change and pallor  Neurological: Negative for dizziness, speech difficulty, weakness and headaches  Hematological: Negative for adenopathy  Psychiatric/Behavioral: Negative for confusion  Physical Exam  Physical Exam   Constitutional: She is oriented to person, place, and time  She appears well-developed and well-nourished  She is cooperative  Non-toxic appearance  She does not have a sickly appearance  She does not appear ill  No distress  HENT:   Head: Normocephalic and atraumatic  Right Ear: Tympanic membrane and external ear normal    Left Ear: Tympanic membrane and external ear normal    Nose: No rhinorrhea, sinus tenderness or nasal deformity  No epistaxis  Right sinus exhibits no maxillary sinus tenderness and no frontal sinus tenderness  Left sinus exhibits no maxillary sinus tenderness and no frontal sinus tenderness  Mouth/Throat: Oropharynx is clear and moist and mucous membranes are normal  Normal dentition  Eyes: Pupils are equal, round, and reactive to light  EOM are normal    Neck: Normal range of motion  Neck supple  Cardiovascular: Normal rate, regular rhythm and normal heart sounds  No murmur heard  Pulmonary/Chest: Effort normal and breath sounds normal  No accessory muscle usage  No respiratory distress  She has no wheezes  She has no rales  She exhibits no tenderness  Abdominal: Soft  She exhibits no distension  There is no tenderness   There is no guarding  Musculoskeletal: Normal range of motion  She exhibits no edema or tenderness  Lymphadenopathy:     She has no cervical adenopathy  Neurological: She is alert and oriented to person, place, and time  She exhibits normal muscle tone  Skin: Skin is warm and dry  No rash noted  No erythema  Psychiatric: She has a normal mood and affect  Nursing note and vitals reviewed        Vital Signs  ED Triage Vitals [03/27/19 1754]   Temperature Pulse Respirations Blood Pressure SpO2   98 7 °F (37 1 °C) 73 16 (!) 176/61 100 %      Temp Source Heart Rate Source Patient Position - Orthostatic VS BP Location FiO2 (%)   Oral Monitor Lying Right arm --      Pain Score       No Pain           Vitals:    03/27/19 1754 03/27/19 2007 03/27/19 2100 03/27/19 2154   BP: (!) 176/61 160/80 121/73 121/73   Pulse: 73 72 82 80   Patient Position - Orthostatic VS: Lying Lying  Sitting         Visual Acuity      ED Medications  Medications   ondansetron (FOR EMS ONLY) (ZOFRAN) 4 mg/2 mL injection 4 mg (0 mg Does not apply Given to EMS 3/27/19 1757)   sodium chloride 0 9 % bolus 1,000 mL (0 mL Intravenous Stopped 3/27/19 1943)   aluminum-magnesium hydroxide-simethicone (MYLANTA) 200-200-20 mg/5 mL oral suspension 30 mL (30 mL Oral Given 3/27/19 1935)   dicyclomine (BENTYL) tablet 20 mg (20 mg Oral Given 3/27/19 1935)   iodixanol (VISIPAQUE) 320 MG/ML injection 100 mL (100 mL Intravenous Given 3/27/19 1944)       Diagnostic Studies  Results Reviewed     Procedure Component Value Units Date/Time    Comprehensive metabolic panel [[id number]] Collected:  03/27/19 1832    Lab Status:  Final result Specimen:  Blood from Arm, Left Updated:  03/27/19 1915     Sodium 139 mmol/L      Potassium 3 7 mmol/L      Chloride 100 mmol/L      CO2 27 mmol/L      ANION GAP 12 mmol/L      BUN 20 mg/dL      Creatinine 1 08 mg/dL      Glucose 117 mg/dL      Calcium 9 7 mg/dL      AST 12 U/L      ALT 17 U/L      Alkaline Phosphatase 62 U/L      Total Protein 8 0 g/dL      Albumin 4 6 g/dL      Total Bilirubin 1 00 mg/dL      eGFR 48 ml/min/1 73sq m     Narrative:       National Kidney Disease Education Program recommendations are as follows:  GFR calculation is accurate only with a steady state creatinine  Chronic Kidney disease less than 60 ml/min/1 73 sq  meters  Kidney failure less than 15 ml/min/1 73 sq  meters  Lipase [[id number]]  (Abnormal) Collected:  03/27/19 1832    Lab Status:  Final result Specimen:  Blood from Arm, Left Updated:  03/27/19 1915     Lipase 892 u/L     Lactic acid, plasma [[id number]]  (Normal) Collected:  03/27/19 1832    Lab Status:  Final result Specimen:  Blood from Arm, Left Updated:  03/27/19 1909     LACTIC ACID 1 0 mmol/L     Narrative:       Result may be elevated if tourniquet was used during collection  Troponin I [[id number]]  (Normal) Collected:  03/27/19 1832    Lab Status:  Final result Specimen:  Blood from Arm, Left Updated:  03/27/19 1909     Troponin I <0 02 ng/mL     CBC and differential [[id number]] Collected:  03/27/19 1832    Lab Status:  Final result Specimen:  Blood from Arm, Left Updated:  03/27/19 1849     WBC 7 21 Thousand/uL      RBC 4 38 Million/uL      Hemoglobin 14 2 g/dL      Hematocrit 40 9 %      MCV 93 fL      MCH 32 4 pg      MCHC 34 7 g/dL      RDW 11 9 %      MPV 10 2 fL      Platelets 134 Thousands/uL      nRBC 0 /100 WBCs      Neutrophils Relative 69 %      Immat GRANS % 0 %      Lymphocytes Relative 23 %      Monocytes Relative 6 %      Eosinophils Relative 1 %      Basophils Relative 1 %      Neutrophils Absolute 4 94 Thousands/µL      Immature Grans Absolute 0 02 Thousand/uL      Lymphocytes Absolute 1 65 Thousands/µL      Monocytes Absolute 0 41 Thousand/µL      Eosinophils Absolute 0 09 Thousand/µL      Basophils Absolute 0 10 Thousands/µL                  CT abdomen pelvis with contrast   Final Result by Nishant Forrester MD (03/27 2022)         1    Mild hyperemia of the small and large bowel with mild small bowel wall thickening without evidence of obstruction  Small amount of pelvic ascites  Correlate for history of gastroenteritis/colitis  2   Colonic diverticulosis without diverticulitis  Workstation performed: [id number]                    Procedures  ECG 12 Lead Documentation  Date/Time: 3/27/2019 6:39 PM  Performed by: DASIA Rodriguez  Authorized by: DASIA Rodriguez     Indications / Diagnosis:  Nausea vomiting  ECG reviewed by me, the ED Provider: yes    Patient location:  ED  Interpretation:     Interpretation: normal    Rate:     ECG rate:  68    ECG rate assessment: normal    Rhythm:     Rhythm: sinus rhythm    Ectopy:     Ectopy: none    QRS:     QRS axis:  Normal  Conduction:     Conduction: normal    ST segments:     ST segments:  Normal  T waves:     T waves: normal             Phone Contacts  ED Phone Contact    ED Course                               MDM  Number of Diagnoses or Management Options  Nausea vomiting and diarrhea: new and requires workup  Diagnosis management comments: Patient feels better after medication administration  She has not had any vomiting or diarrhea since her time here in the department  Patient offered observation in the hospital but is okay with going home  Plan is to send her home with supportive therapy  Return precautions discussed  CT demonstrating thickened intestinal wall concerning for enteritis which would fit clinically         Amount and/or Complexity of Data Reviewed  Clinical lab tests: reviewed and ordered  Tests in the radiology section of CPT®: reviewed and ordered  Tests in the medicine section of CPT®: ordered and reviewed  Obtain history from someone other than the patient: yes  Independent visualization of images, tracings, or specimens: yes    Patient Progress  Patient progress: stable      Disposition  Final diagnoses:   Nausea vomiting and diarrhea     Time reflects when diagnosis was documented in both MDM as applicable and the Disposition within this note     Time User Action Codes Description Comment    3/27/2019  8:56 PM Joann Moore Add [R11 2,  R19 7] Nausea vomiting and diarrhea       ED Disposition     ED Disposition Condition Date/Time Comment    Discharge Stable Wed Mar 27, 2019  8:56 PM Vineetdarien Estrada discharge to home/self care  Follow-up Information     Follow up With Specialties Details Why Balaji, [id number], Nurse Practitioner In 2 days For [address]  [phone]            Discharge Medication List as of 3/27/2019  9:00 PM      START taking these medications    Details   famotidine-calcium carbonate-magnesium hydroxide (PEPCID COMPLETE) -165 MG CHEW Chew 1 tablet 2 (two) times a day for 25 doses, Starting Wed 3/27/2019, Until Tue 4/9/2019, Normal      ondansetron (ZOFRAN-ODT) 4 mg disintegrating tablet Take 1 tablet (4 mg total) by mouth every 8 (eight) hours as needed for nausea or vomiting for up to 5 days, Starting Wed 3/27/2019, Until Mon 4/1/2019, Normal         CONTINUE these medications which have NOT CHANGED    Details   cholestyramine (QUESTRAN) 4 g packet Take 1 packet (4 g total) by mouth 3 (three) times a day with meals, Starting Fri 1/4/2019, Normal      FLUAD 0 5 ML CORDELL Starting Thu 9/13/2018, Historical Med      lisinopril (ZESTRIL) 5 mg tablet Take 1 tablet (5 mg total) by mouth daily, Starting Mon 1/28/2019, Normal      rivastigmine (EXELON) 9 5 mg/24 hr TD 24 hr patch APPLY ONE PATCH TO THE SKIN EVERY DAY, Normal           No discharge procedures on file      ED Provider  Electronically Signed by           DASIA Nash  03/27/19 [id number] Bret Isaacs  03/27/19 1488

## 2019-03-27 NOTE — TELEPHONE ENCOUNTER
----- Message from [address] [place] [address]  sent at 3/27/2019  8:27 AM EDT -----  Dexa scan shows osteopenia, but her frax score is elevated  I suggest alendronate once weekly same time of day early in the am with no other meds  Must sit upright for 1/2 to 1 hour after this med  Also needs 1200 mg of calcium daily in the diet and vitamin d3 2000 units daily  If she is ok with this I will order  Let me know

## 2019-03-28 ENCOUNTER — TELEPHONE (OUTPATIENT)
Dept: FAMILY MEDICINE CLINIC | Facility: CLINIC | Age: 79
End: 2019-03-28

## 2019-03-28 LAB
ATRIAL RATE: 68 BPM
P AXIS: 86 DEGREES
PR INTERVAL: 198 MS
QRS AXIS: -16 DEGREES
QRSD INTERVAL: 76 MS
QT INTERVAL: 412 MS
QTC INTERVAL: 438 MS
T WAVE AXIS: 61 DEGREES
VENTRICULAR RATE: 68 BPM

## 2019-03-28 PROCEDURE — 93010 ELECTROCARDIOGRAM REPORT: CPT | Performed by: INTERNAL MEDICINE

## 2019-03-29 ENCOUNTER — OFFICE VISIT (OUTPATIENT)
Dept: FAMILY MEDICINE CLINIC | Facility: CLINIC | Age: 79
End: 2019-03-29
Payer: COMMERCIAL

## 2019-03-29 VITALS
RESPIRATION RATE: 16 BRPM | DIASTOLIC BLOOD PRESSURE: 80 MMHG | HEART RATE: 72 BPM | BODY MASS INDEX: 19.83 KG/M2 | TEMPERATURE: 98.6 F | SYSTOLIC BLOOD PRESSURE: 122 MMHG | HEIGHT: 61 IN | OXYGEN SATURATION: 100 % | WEIGHT: 105.04 LBS

## 2019-03-29 DIAGNOSIS — R11.2 NAUSEA, VOMITING AND DIARRHEA: Primary | ICD-10-CM

## 2019-03-29 DIAGNOSIS — R19.7 NAUSEA, VOMITING AND DIARRHEA: Primary | ICD-10-CM

## 2019-03-29 DIAGNOSIS — K92.89 GAS BLOAT SYNDROME: ICD-10-CM

## 2019-03-29 DIAGNOSIS — M85.80 OSTEOPENIA, UNSPECIFIED LOCATION: ICD-10-CM

## 2019-03-29 PROCEDURE — 99214 OFFICE O/P EST MOD 30 MIN: CPT | Performed by: FAMILY MEDICINE

## 2019-03-29 NOTE — PATIENT INSTRUCTIONS
Discussed all with patient and son  Since leave has had multiple episodes of nausea vomiting diarrhea and gas bloat I would prefer to have her evaluated by Gastroenterology  We have stopped her medications for dementia it numerous times and at some point she does well and then she gets nausea vomiting and diarrhea again would prefer to have Gastroenterology see her clear her and advised me of his suggestion  Also her DEXA scan shows that she could be at risk for fracture  I will hold on alendronate until she is cleared by GI  Follow-up here about 2 weeks after you see the gastroenterologist   Christophe Gross of liquids eat 3 meals a day and a little snack

## 2019-03-29 NOTE — PROGRESS NOTES
Assessment/Plan:     Chronic Problems:  No problem-specific Assessment & Plan notes found for this encounter  Visit Diagnosis:  Diagnoses and all orders for this visit:    Recurrent nausea, vomiting and diarrhea with elevated lipase  Comments: Will refer to gi as pt has had multiple episodes of n/v/d and gas bloat with elevated lipase  Orders:  -     Ambulatory referral to Gastroenterology; Future    Gas bloat syndrome  -     Ambulatory referral to Gastroenterology; Future    Osteopenia, unspecified location  Comments:  Discussed with patient and family that patient does have an elevated FRAX score  Will hold on alendronate until seen by GI          Subjective:    Patient ID: Fernanda Dior is a 78 y o  female  Pt is here for er f/u  Pt had been seen in [facility] er for nausea, vomiting and diarrhea  Had these sx for 1 day, but her daughter in law panicked and sent her to the er  Sent her home with meds for nausea and pepcid ac otc  Able to eat last night  Felt great last night and was able to sleep  Here with her son Jeff Clarke  Pt states she is much better, and able to eat today  Takes all other meds as directed  No side effects noted  The following portions of the patient's history were reviewed and updated as appropriate: allergies, current medications, past family history, past medical history, past social history, past surgical history and problem list     Review of Systems   Constitutional: Negative for chills, diaphoresis, fatigue and fever  HENT: Negative  Eyes: Negative  Respiratory: Positive for cough  Negative for shortness of breath and wheezing  Gastrointestinal: Positive for constipation  Negative for abdominal pain, diarrhea, nausea and vomiting  Has a lot of gas  H/O same recently with multiple discrete episodes of n/v/d  Now has meals on wheels, but some of the food is not appealing  Genitourinary: Negative      Musculoskeletal: Negative for arthralgias and myalgias  Has one rib on the right anterior lower rib area which is painful at times  Neurological: Negative for dizziness, light-headedness and headaches  Psychiatric/Behavioral: Negative for dysphoric mood  The patient is not nervous/anxious  /80   Pulse 72   Temp 98 6 °F (37 °C)   Resp 16   Ht 5' 1" (1 549 m)   Wt 47 6 kg (105 lb 0 6 oz)   SpO2 100%   BMI 19 85 kg/m²   Social History     Socioeconomic History    Marital status:      Spouse name: Not on file    Number of children: Not on file    Years of education: Not on file    Highest education level: Not on file   Occupational History    Not on file   Social Needs    Financial resource strain: Not on file    Food insecurity:     Worry: Not on file     Inability: Not on file    Transportation needs:     Medical: Not on file     Non-medical: Not on file   Tobacco Use    Smoking status: Never Smoker    Smokeless tobacco: Never Used   Substance and Sexual Activity    Alcohol use:  Yes     Alcohol/week: 4 2 oz     Types: 7 Glasses of wine per week    Drug use: No    Sexual activity: Never   Lifestyle    Physical activity:     Days per week: Not on file     Minutes per session: Not on file    Stress: Not on file   Relationships    Social connections:     Talks on phone: Not on file     Gets together: Not on file     Attends Tenri[employer] service: Not on file     Active member of club or organization: Not on file     Attends meetings of clubs or organizations: Not on file     Relationship status: Not on file    Intimate partner violence:     Fear of current or ex partner: Not on file     Emotionally abused: Not on file     Physically abused: Not on file     Forced sexual activity: Not on file   Other Topics Concern    Not on file   Social History Narrative    Not on file     Past Medical History:   Diagnosis Date    Aortic aneurysm ([facility] [address] )     4 1 cm on December 6, 2018    CKD (chronic kidney disease) stage 2, GFR 60-89 ml/min     Colon polyps     Dementia     Hyperlipidemia     Hypertension     Insomnia     Osteopenia with elevated frax score     Osteoporosis     Uterine fibroid      History reviewed  No pertinent family history  History reviewed  No pertinent surgical history      Current Outpatient Medications:     cholestyramine (QUESTRAN) 4 g packet, Take 1 packet (4 g total) by mouth 3 (three) times a day with meals, Disp: 90 packet, Rfl: 0    lisinopril (ZESTRIL) 5 mg tablet, Take 1 tablet (5 mg total) by mouth daily, Disp: 90 tablet, Rfl: 1    ondansetron (ZOFRAN-ODT) 4 mg disintegrating tablet, Take 1 tablet (4 mg total) by mouth every 8 (eight) hours as needed for nausea or vomiting for up to 5 days, Disp: 15 tablet, Rfl: 0    rivastigmine (EXELON) 9 5 mg/24 hr TD 24 hr patch, APPLY ONE PATCH TO THE SKIN EVERY DAY, Disp: 30 patch, Rfl: 3    famotidine-calcium carbonate-magnesium hydroxide (PEPCID COMPLETE) -165 MG CHEW, Chew 1 tablet 2 (two) times a day for 25 doses (Patient not taking: Reported on 3/29/2019), Disp: 25 tablet, Rfl: 0    FLUAD 0 5 ML CODRELL, , Disp: , Rfl:     Allergies   Allergen Reactions    Cefazolin      Other reaction(s): Unknown          Lab Review   Admission on 03/27/2019, Discharged on 03/27/2019   Component Date Value    WBC 03/27/2019 7 21     RBC 03/27/2019 4 38     Hemoglobin 03/27/2019 14 2     Hematocrit 03/27/2019 40 9     MCV 03/27/2019 93     MCH 03/27/2019 32 4     MCHC 03/27/2019 34 7     RDW 03/27/2019 11 9     MPV 03/27/2019 10 2     Platelets 44/36/4090 356     nRBC 03/27/2019 0     Neutrophils Relative 03/27/2019 69     Immat GRANS % 03/27/2019 0     Lymphocytes Relative 03/27/2019 23     Monocytes Relative 03/27/2019 6     Eosinophils Relative 03/27/2019 1     Basophils Relative 03/27/2019 1     Neutrophils Absolute 03/27/2019 4 94     Immature Grans Absolute 03/27/2019 0 02     Lymphocytes Absolute 03/27/2019 1 65     Monocytes Absolute 03/27/2019 0 41     Eosinophils Absolute 03/27/2019 0 09     Basophils Absolute 03/27/2019 0 10     Sodium 03/27/2019 139     Potassium 03/27/2019 3 7     Chloride 03/27/2019 100     CO2 03/27/2019 27     ANION GAP 03/27/2019 12     BUN 03/27/2019 20     Creatinine 03/27/2019 1 08     Glucose 03/27/2019 117     Calcium 03/27/2019 9 7     AST 03/27/2019 12     ALT 03/27/2019 17     Alkaline Phosphatase 03/27/2019 62     Total Protein 03/27/2019 8 0     Albumin 03/27/2019 4 6     Total Bilirubin 03/27/2019 1 00     eGFR 03/27/2019 48     LACTIC ACID 03/27/2019 1 0     Lipase 03/27/2019 892*    Troponin I 03/27/2019 <0 02     Ventricular Rate 03/27/2019 68     Atrial Rate 03/27/2019 68     WI Interval 03/27/2019 198     QRSD Interval 03/27/2019 76     QT Interval 03/27/2019 412     QTC Interval 03/27/2019 438     P Axis 03/27/2019 86     QRS Axis 03/27/2019 -16     T Wave Axis 03/27/2019 61         Imaging: Dxa Bone Density Spine Hip And Pelvis    Result Date: 3/27/2019  Narrative: CENTRAL  DXA SCAN CLINICAL HISTORY:   78year old post-menopausal  female with no significant past medical history  Calcium use  Osteoporosis screening  TECHNIQUE: Bone densitometry was performed using a Horizon C bone densitometer  Regions of interest appear properly placed  There are no obvious fractures or other confounding variables which could limit the study  Degenerative changes of the lumbar spine and hip  This will falsely elevate the bone mineral densities in these regions  COMPARISON:  None  RESULTS: LUMBAR SPINE:  L1-L4: BMD 0 879 gm/cm2 T-score -1 5 Z-score 1 2 LUMBAR SPINE L1-L3 (average) : BMD 0 803 gm/sq-cm T-score is -2 0 Z-score is 0 7 LEFT TOTAL HIP: BMD 0 862 gm/cm2 T-score -0 7 Z-score 1 5 LEFT FEMORAL NECK: BMD 0 740 gm/cm2 T-score -1 0 Z-score 1 4     Impression: 1    Based on the [facility] classification, the T-score of -2 0 in the lumbar spine is consistent with low bone mineral density (OSTEOPENIA)  2   Any secondary causes of low bone mineral density should be excluded prior to treatment, if clinically indicated  3   A daily intake of at least 1200 mg calcium and 800 - 1000 IU of Vitamin D, as well as weight bearing and muscle strengthening exercise, fall prevention and avoidance of tobacco and excessive alcohol intake as basic preventive measures are suggested  The 10 year risk of hip fracture is 3%, with the 10 year risk of major osteoporotic fracture being 14%, as calculated by the [facility] fracture risk assessment tool (FRAX)  The current NOF guidelines recommend treating patients with FRAX 10 year risk score of  >3% for hip fracture and >20% for major osteoporotic fracture  WHO CLASSIFICATION: Normal (a T-score of -1 0 or higher) Low bone mineral density (a T-score of less than -1 0 but higher than -2 5) Osteoporosis (a T-score of -2 5 or less) Severe osteoporosis (a T-score of -2 5 or less with a fragility fracture)    Workstation performed: [id number]     Ct Abdomen Pelvis With Contrast    Result Date: 3/27/2019  Narrative: CT ABDOMEN AND PELVIS WITH IV CONTRAST INDICATION:   vomiting and diarrhea  COMPARISON:  CT chest, abdomen and pelvis study of December 5, 2018  TECHNIQUE:  CT examination of the abdomen and pelvis was performed  Axial, sagittal, and coronal 2D reformatted images were created from the source data and submitted for interpretation  Radiation dose length product (DLP) for this visit:  441 mGy-cm   This examination, like all CT scans performed in the [facility], was performed utilizing techniques to minimize radiation dose exposure, including the use of iterative reconstruction and automated exposure control  IV Contrast:  100 mL of iodixanol (VISIPAQUE) Enteric Contrast:  Enteric contrast was not administered   FINDINGS: ABDOMEN LOWER CHEST:  No clinically significant abnormality identified in the visualized lower chest  LIVER/BILIARY TREE: Unremarkable  GALLBLADDER:  No calcified gallstones  No pericholecystic inflammatory change  SPLEEN:  Unremarkable  PANCREAS:  Unremarkable  ADRENAL GLANDS:  Unremarkable  KIDNEYS/URETERS:  Mild cortical volume loss  Both kidneys demonstrate symmetric uptake of intravenous contrast   There is no perinephric fluid collection  Extrarenal pelvises noted bilaterally  One or more sharply circumscribed subcentimeter renal hypodensities are noted  These lesions are too small to accurately characterize, but are statistically most likely to represent benign cortical renal cyst(s)  According to the guidelines published in the [facility] Paper of the ACR Incidental Findings Committee (Radiology 2010), no further workup of these lesions is recommended  STOMACH AND BOWEL:  The stomach is moderately distended and fluid filled without wall thickening  Mild enhancement of the small bowel wall with mild bowel wall thickening throughout much of the abdomen  There is also mild hyperemia of the right colonic  wall without significant wall thickening  The left colon and the rectosigmoid colon also demonstrates segmental areas of bowel wall enhancement  Redemonstrated is the mild herniation of the bowel within the right inguinal canal, unchanged from the prior study  There is no evidence of bowel obstruction  Few scattered cecal diverticula without diverticulitis  There is also suggestion of scattered diverticular disease in the left colon APPENDIX:  No findings to suggest appendicitis  ABDOMINOPELVIC CAVITY:  Small amount of free fluid within the cul-de-sac  VESSELS:  Unremarkable for patient's age  PELVIS REPRODUCTIVE ORGANS:  Retroverted uterus  URINARY BLADDER:  Moderately distended  No bladder wall thickening  ABDOMINAL WALL/INGUINAL REGIONS:  Unremarkable  OSSEOUS STRUCTURES:  No acute fracture or destructive osseous lesion  Degenerative discogenic disease    Disc bulge and ligamentum flavum thickening with bilateral facet arthropathy at L3-L4 resulting in moderate spinal stenosis  There is also mild to moderate degenerative spinal canal and foraminal stenosis at L4-L5  Impression: 1  Mild hyperemia of the small and large bowel with mild small bowel wall thickening without evidence of obstruction  Small amount of pelvic ascites  Correlate for history of gastroenteritis/colitis  2   Colonic diverticulosis without diverticulitis  Workstation performed: [id number]       Objective:     Physical Exam   Constitutional: She is oriented to person, place, and time  She appears well-developed and well-nourished  No distress  Thin looking  HENT:   Head: Normocephalic and atraumatic  Right Ear: External ear normal    Left Ear: External ear normal    Mouth/Throat: Oropharynx is clear and moist    Eyes: Pupils are equal, round, and reactive to light  Conjunctivae and EOM are normal  Right eye exhibits no discharge  Left eye exhibits no discharge  Neck: Normal range of motion  Neck supple  Cardiovascular: Normal rate, regular rhythm and normal heart sounds  Exam reveals no friction rub  No murmur heard  Pulmonary/Chest: Effort normal and breath sounds normal  No respiratory distress  She has no wheezes  Abdominal: Soft  Bowel sounds are normal  There is no tenderness  Musculoskeletal: Normal range of motion  She exhibits no edema, tenderness or deformity  Lymphadenopathy:     She has no cervical adenopathy  Neurological: She is alert and oriented to person, place, and time  No cranial nerve deficit  Skin: Skin is warm and dry  No rash noted  She is not diaphoretic  Psychiatric: She has a normal mood and affect  Her behavior is normal  Judgment and thought content normal          Patient Instructions   Discussed all with patient and son  Since leave has had multiple episodes of nausea vomiting diarrhea and gas bloat I would prefer to have her evaluated by Gastroenterology    We have stopped her medications for dementia it numerous times and at some point she does well and then she gets nausea vomiting and diarrhea again would prefer to have Gastroenterology see her clear her and advised me of his suggestion  Also her DEXA scan shows that she could be at risk for fracture  I will hold on alendronate until she is cleared by GI  Follow-up here about 2 weeks after you see the gastroenterologist   Nakul Jerez of liquids eat 3 meals a day and a little snack  DASIA Davies    Portions of the record may have been created with voice recognition software   Occasional wrong word or "sound a like" substitutions may have occurred due to the inherent limitations of voice recognition software   Read the chart carefully and recognize, using context, where substitutions have occurred

## 2019-04-01 ENCOUNTER — TELEPHONE (OUTPATIENT)
Dept: FAMILY MEDICINE CLINIC | Facility: CLINIC | Age: 79
End: 2019-04-01

## 2019-04-01 ENCOUNTER — HOSPITAL ENCOUNTER (OUTPATIENT)
Facility: HOSPITAL | Age: 79
Setting detail: OBSERVATION
Discharge: HOME | End: 2019-04-03
Attending: EMERGENCY MEDICINE | Admitting: INTERNAL MEDICINE
Payer: COMMERCIAL

## 2019-04-01 DIAGNOSIS — R11.10 VOMITING: ICD-10-CM

## 2019-04-01 DIAGNOSIS — K52.9 COLITIS: ICD-10-CM

## 2019-04-01 DIAGNOSIS — K52.9 GASTROENTERITIS: ICD-10-CM

## 2019-04-01 DIAGNOSIS — R11.2 NAUSEA & VOMITING: ICD-10-CM

## 2019-04-01 DIAGNOSIS — R77.8 ELEVATED TROPONIN: ICD-10-CM

## 2019-04-01 DIAGNOSIS — R11.0 NAUSEA: Primary | ICD-10-CM

## 2019-04-01 PROBLEM — R79.89 ELEVATED TROPONIN: Status: ACTIVE | Noted: 2019-04-01

## 2019-04-01 PROBLEM — R19.7 DIARRHEA: Status: ACTIVE | Noted: 2019-04-01

## 2019-04-01 LAB
ALBUMIN SERPL BCP-MCNC: 4.1 G/DL (ref 3.5–5)
ALP SERPL-CCNC: 57 U/L (ref 46–116)
ALT SERPL W P-5'-P-CCNC: 18 U/L (ref 12–78)
ANION GAP SERPL CALCULATED.3IONS-SCNC: 8 MMOL/L (ref 4–13)
AST SERPL W P-5'-P-CCNC: 14 U/L (ref 5–45)
ATRIAL RATE: 69 BPM
BASOPHILS # BLD AUTO: 0.07 THOUSANDS/ΜL (ref 0–0.1)
BASOPHILS NFR BLD AUTO: 1 % (ref 0–1)
BILIRUB SERPL-MCNC: 1 MG/DL (ref 0.2–1)
BILIRUB UR QL STRIP: NEGATIVE
BUN SERPL-MCNC: 18 MG/DL (ref 5–25)
CALCIUM SERPL-MCNC: 8.9 MG/DL (ref 8.3–10.1)
CHLORIDE SERPL-SCNC: 106 MMOL/L (ref 100–108)
CLARITY UR: CLEAR
CO2 SERPL-SCNC: 29 MMOL/L (ref 21–32)
COLOR UR: NORMAL
CREAT SERPL-MCNC: 1.03 MG/DL (ref 0.6–1.3)
EOSINOPHIL # BLD AUTO: 0.02 THOUSAND/ΜL (ref 0–0.61)
EOSINOPHIL NFR BLD AUTO: 0 % (ref 0–6)
ERYTHROCYTE [DISTWIDTH] IN BLOOD BY AUTOMATED COUNT: 11.9 % (ref 11.6–15.1)
GFR SERPL CREATININE-BSD FRML MDRD: 51 ML/MIN/1.73SQ M
GLUCOSE SERPL-MCNC: 128 MG/DL (ref 65–140)
GLUCOSE UR STRIP-MCNC: NEGATIVE MG/DL
HCT VFR BLD AUTO: 36.8 % (ref 34.8–46.1)
HGB BLD-MCNC: 12.7 G/DL (ref 11.5–15.4)
HGB UR QL STRIP.AUTO: NEGATIVE
IMM GRANULOCYTES # BLD AUTO: 0.03 THOUSAND/UL (ref 0–0.2)
IMM GRANULOCYTES NFR BLD AUTO: 0 % (ref 0–2)
KETONES UR STRIP-MCNC: NEGATIVE MG/DL
LEUKOCYTE ESTERASE UR QL STRIP: NEGATIVE
LIPASE SERPL-CCNC: 178 U/L (ref 73–393)
LYMPHOCYTES # BLD AUTO: 0.93 THOUSANDS/ΜL (ref 0.6–4.47)
LYMPHOCYTES NFR BLD AUTO: 11 % (ref 14–44)
MAGNESIUM SERPL-MCNC: 2 MG/DL (ref 1.6–2.6)
MCH RBC QN AUTO: 32.1 PG (ref 26.8–34.3)
MCHC RBC AUTO-ENTMCNC: 34.5 G/DL (ref 31.4–37.4)
MCV RBC AUTO: 93 FL (ref 82–98)
MONOCYTES # BLD AUTO: 0.35 THOUSAND/ΜL (ref 0.17–1.22)
MONOCYTES NFR BLD AUTO: 4 % (ref 4–12)
NEUTROPHILS # BLD AUTO: 7.36 THOUSANDS/ΜL (ref 1.85–7.62)
NEUTS SEG NFR BLD AUTO: 84 % (ref 43–75)
NITRITE UR QL STRIP: NEGATIVE
NRBC BLD AUTO-RTO: 0 /100 WBCS
P AXIS: 84 DEGREES
PH UR STRIP.AUTO: 8 [PH]
PLATELET # BLD AUTO: 297 THOUSANDS/UL (ref 149–390)
PMV BLD AUTO: 9.4 FL (ref 8.9–12.7)
POTASSIUM SERPL-SCNC: 3.8 MMOL/L (ref 3.5–5.3)
PR INTERVAL: 192 MS
PROT SERPL-MCNC: 7.3 G/DL (ref 6.4–8.2)
PROT UR STRIP-MCNC: NEGATIVE MG/DL
QRS AXIS: 6 DEGREES
QRSD INTERVAL: 90 MS
QT INTERVAL: 436 MS
QTC INTERVAL: 467 MS
RBC # BLD AUTO: 3.96 MILLION/UL (ref 3.81–5.12)
SODIUM SERPL-SCNC: 143 MMOL/L (ref 136–145)
SP GR UR STRIP.AUTO: 1.01 (ref 1–1.03)
T WAVE AXIS: 63 DEGREES
TROPONIN I SERPL-MCNC: 0.05 NG/ML
UROBILINOGEN UR QL STRIP.AUTO: 0.2 E.U./DL
VENTRICULAR RATE: 69 BPM
WBC # BLD AUTO: 8.76 THOUSAND/UL (ref 4.31–10.16)

## 2019-04-01 PROCEDURE — 99220 PR INITIAL OBSERVATION CARE/DAY 70 MINUTES: CPT | Performed by: NURSE PRACTITIONER

## 2019-04-01 PROCEDURE — 81003 URINALYSIS AUTO W/O SCOPE: CPT | Performed by: EMERGENCY MEDICINE

## 2019-04-01 PROCEDURE — 83735 ASSAY OF MAGNESIUM: CPT | Performed by: EMERGENCY MEDICINE

## 2019-04-01 PROCEDURE — 36415 COLL VENOUS BLD VENIPUNCTURE: CPT | Performed by: EMERGENCY MEDICINE

## 2019-04-01 PROCEDURE — 93010 ELECTROCARDIOGRAM REPORT: CPT | Performed by: INTERNAL MEDICINE

## 2019-04-01 PROCEDURE — 93005 ELECTROCARDIOGRAM TRACING: CPT

## 2019-04-01 PROCEDURE — 99285 EMERGENCY DEPT VISIT HI MDM: CPT | Performed by: EMERGENCY MEDICINE

## 2019-04-01 PROCEDURE — 84484 ASSAY OF TROPONIN QUANT: CPT | Performed by: EMERGENCY MEDICINE

## 2019-04-01 PROCEDURE — 99285 EMERGENCY DEPT VISIT HI MDM: CPT

## 2019-04-01 PROCEDURE — 83690 ASSAY OF LIPASE: CPT | Performed by: EMERGENCY MEDICINE

## 2019-04-01 PROCEDURE — 96361 HYDRATE IV INFUSION ADD-ON: CPT

## 2019-04-01 PROCEDURE — 85025 COMPLETE CBC W/AUTO DIFF WBC: CPT | Performed by: EMERGENCY MEDICINE

## 2019-04-01 PROCEDURE — 96374 THER/PROPH/DIAG INJ IV PUSH: CPT

## 2019-04-01 PROCEDURE — 80053 COMPREHEN METABOLIC PANEL: CPT | Performed by: EMERGENCY MEDICINE

## 2019-04-01 RX ORDER — ONDANSETRON 2 MG/ML
4 INJECTION INTRAMUSCULAR; INTRAVENOUS EVERY 6 HOURS PRN
Status: DISCONTINUED | OUTPATIENT
Start: 2019-04-01 | End: 2019-04-03 | Stop reason: HOSPADM

## 2019-04-01 RX ORDER — ACETAMINOPHEN 325 MG/1
650 TABLET ORAL EVERY 6 HOURS PRN
Status: DISCONTINUED | OUTPATIENT
Start: 2019-04-01 | End: 2019-04-03 | Stop reason: HOSPADM

## 2019-04-01 RX ORDER — HEPARIN SODIUM 5000 [USP'U]/ML
5000 INJECTION, SOLUTION INTRAVENOUS; SUBCUTANEOUS EVERY 8 HOURS SCHEDULED
Status: DISCONTINUED | OUTPATIENT
Start: 2019-04-02 | End: 2019-04-03 | Stop reason: HOSPADM

## 2019-04-01 RX ORDER — SODIUM CHLORIDE 9 MG/ML
100 INJECTION, SOLUTION INTRAVENOUS CONTINUOUS
Status: DISCONTINUED | OUTPATIENT
Start: 2019-04-02 | End: 2019-04-02

## 2019-04-01 RX ORDER — ONDANSETRON 2 MG/ML
4 INJECTION INTRAMUSCULAR; INTRAVENOUS ONCE
Status: COMPLETED | OUTPATIENT
Start: 2019-04-01 | End: 2019-04-01

## 2019-04-01 RX ORDER — LISINOPRIL 5 MG/1
5 TABLET ORAL DAILY
Status: DISCONTINUED | OUTPATIENT
Start: 2019-04-02 | End: 2019-04-03 | Stop reason: HOSPADM

## 2019-04-01 RX ORDER — RIVASTIGMINE 9.5 MG/24H
1 PATCH, EXTENDED RELEASE TRANSDERMAL DAILY
Status: DISCONTINUED | OUTPATIENT
Start: 2019-04-02 | End: 2019-04-03 | Stop reason: HOSPADM

## 2019-04-01 RX ORDER — CHOLESTYRAMINE LIGHT 4 G/5.7G
4 POWDER, FOR SUSPENSION ORAL 3 TIMES DAILY
Status: DISCONTINUED | OUTPATIENT
Start: 2019-04-02 | End: 2019-04-03 | Stop reason: HOSPADM

## 2019-04-01 RX ADMIN — ONDANSETRON 4 MG: 2 INJECTION INTRAMUSCULAR; INTRAVENOUS at 17:26

## 2019-04-01 RX ADMIN — SODIUM CHLORIDE 1000 ML: 0.9 INJECTION, SOLUTION INTRAVENOUS at 17:27

## 2019-04-02 PROBLEM — A08.4 GASTROENTERITIS AND COLITIS, VIRAL: Status: ACTIVE | Noted: 2019-04-01

## 2019-04-02 LAB
ANION GAP SERPL CALCULATED.3IONS-SCNC: 9 MMOL/L (ref 4–13)
BUN SERPL-MCNC: 13 MG/DL (ref 5–25)
CALCIUM SERPL-MCNC: 9.2 MG/DL (ref 8.3–10.1)
CHLORIDE SERPL-SCNC: 105 MMOL/L (ref 100–108)
CO2 SERPL-SCNC: 28 MMOL/L (ref 21–32)
CREAT SERPL-MCNC: 1.17 MG/DL (ref 0.6–1.3)
ERYTHROCYTE [DISTWIDTH] IN BLOOD BY AUTOMATED COUNT: 11.9 % (ref 11.6–15.1)
GFR SERPL CREATININE-BSD FRML MDRD: 44 ML/MIN/1.73SQ M
GLUCOSE P FAST SERPL-MCNC: 100 MG/DL (ref 65–99)
GLUCOSE SERPL-MCNC: 100 MG/DL (ref 65–140)
HCT VFR BLD AUTO: 38.6 % (ref 34.8–46.1)
HGB BLD-MCNC: 13.3 G/DL (ref 11.5–15.4)
MCH RBC QN AUTO: 32.2 PG (ref 26.8–34.3)
MCHC RBC AUTO-ENTMCNC: 34.5 G/DL (ref 31.4–37.4)
MCV RBC AUTO: 94 FL (ref 82–98)
PLATELET # BLD AUTO: 316 THOUSANDS/UL (ref 149–390)
PMV BLD AUTO: 9.3 FL (ref 8.9–12.7)
POTASSIUM SERPL-SCNC: 3.5 MMOL/L (ref 3.5–5.3)
RBC # BLD AUTO: 4.13 MILLION/UL (ref 3.81–5.12)
SODIUM SERPL-SCNC: 142 MMOL/L (ref 136–145)
TROPONIN I SERPL-MCNC: 0.06 NG/ML
TROPONIN I SERPL-MCNC: 0.06 NG/ML
WBC # BLD AUTO: 7.54 THOUSAND/UL (ref 4.31–10.16)

## 2019-04-02 PROCEDURE — 36415 COLL VENOUS BLD VENIPUNCTURE: CPT | Performed by: NURSE PRACTITIONER

## 2019-04-02 PROCEDURE — G8978 MOBILITY CURRENT STATUS: HCPCS

## 2019-04-02 PROCEDURE — G8980 MOBILITY D/C STATUS: HCPCS

## 2019-04-02 PROCEDURE — 97163 PT EVAL HIGH COMPLEX 45 MIN: CPT

## 2019-04-02 PROCEDURE — G8979 MOBILITY GOAL STATUS: HCPCS

## 2019-04-02 PROCEDURE — 85027 COMPLETE CBC AUTOMATED: CPT | Performed by: NURSE PRACTITIONER

## 2019-04-02 PROCEDURE — 80048 BASIC METABOLIC PNL TOTAL CA: CPT | Performed by: NURSE PRACTITIONER

## 2019-04-02 PROCEDURE — 99224 PR SBSQ OBSERVATION CARE/DAY 15 MINUTES: CPT | Performed by: FAMILY MEDICINE

## 2019-04-02 PROCEDURE — 84484 ASSAY OF TROPONIN QUANT: CPT | Performed by: NURSE PRACTITIONER

## 2019-04-02 RX ADMIN — CHOLESTYRAMINE 4 G: 4 POWDER, FOR SUSPENSION ORAL at 21:04

## 2019-04-02 RX ADMIN — CHOLESTYRAMINE 4 G: 4 POWDER, FOR SUSPENSION ORAL at 15:24

## 2019-04-02 RX ADMIN — CHOLESTYRAMINE 4 G: 4 POWDER, FOR SUSPENSION ORAL at 03:57

## 2019-04-02 RX ADMIN — HEPARIN SODIUM 5000 UNITS: 5000 INJECTION INTRAVENOUS; SUBCUTANEOUS at 21:04

## 2019-04-02 RX ADMIN — RIVASTIGMINE TRANSDERMAL SYSTEM 1 PATCH: 9.5 PATCH, EXTENDED RELEASE TRANSDERMAL at 09:31

## 2019-04-02 RX ADMIN — SODIUM CHLORIDE 100 ML/HR: 0.9 INJECTION, SOLUTION INTRAVENOUS at 03:13

## 2019-04-02 RX ADMIN — LISINOPRIL 5 MG: 5 TABLET ORAL at 09:30

## 2019-04-02 RX ADMIN — HEPARIN SODIUM 5000 UNITS: 5000 INJECTION INTRAVENOUS; SUBCUTANEOUS at 06:13

## 2019-04-03 ENCOUNTER — TELEPHONE (OUTPATIENT)
Dept: GASTROENTEROLOGY | Facility: CLINIC | Age: 79
End: 2019-04-03

## 2019-04-03 VITALS
OXYGEN SATURATION: 99 % | RESPIRATION RATE: 18 BRPM | DIASTOLIC BLOOD PRESSURE: 55 MMHG | TEMPERATURE: 98.7 F | HEIGHT: 61 IN | HEART RATE: 59 BPM | BODY MASS INDEX: 19.14 KG/M2 | WEIGHT: 101.4 LBS | SYSTOLIC BLOOD PRESSURE: 108 MMHG

## 2019-04-03 PROBLEM — R77.8 ELEVATED TROPONIN: Status: RESOLVED | Noted: 2019-04-01 | Resolved: 2019-04-03

## 2019-04-03 PROBLEM — R11.2 NAUSEA & VOMITING: Status: RESOLVED | Noted: 2018-12-05 | Resolved: 2019-04-03

## 2019-04-03 PROBLEM — R79.89 ELEVATED TROPONIN: Status: RESOLVED | Noted: 2019-04-01 | Resolved: 2019-04-03

## 2019-04-03 LAB
BASOPHILS # BLD AUTO: 0.1 THOUSANDS/ΜL (ref 0–0.1)
BASOPHILS NFR BLD AUTO: 2 % (ref 0–1)
CRP SERPL QL: <3 MG/L
EOSINOPHIL # BLD AUTO: 0.21 THOUSAND/ΜL (ref 0–0.61)
EOSINOPHIL NFR BLD AUTO: 3 % (ref 0–6)
ERYTHROCYTE [DISTWIDTH] IN BLOOD BY AUTOMATED COUNT: 12.1 % (ref 11.6–15.1)
HCT VFR BLD AUTO: 32.9 % (ref 34.8–46.1)
HGB BLD-MCNC: 11.3 G/DL (ref 11.5–15.4)
IMM GRANULOCYTES # BLD AUTO: 0.02 THOUSAND/UL (ref 0–0.2)
IMM GRANULOCYTES NFR BLD AUTO: 0 % (ref 0–2)
LYMPHOCYTES # BLD AUTO: 2.25 THOUSANDS/ΜL (ref 0.6–4.47)
LYMPHOCYTES NFR BLD AUTO: 34 % (ref 14–44)
MCH RBC QN AUTO: 32.8 PG (ref 26.8–34.3)
MCHC RBC AUTO-ENTMCNC: 34.3 G/DL (ref 31.4–37.4)
MCV RBC AUTO: 96 FL (ref 82–98)
MONOCYTES # BLD AUTO: 0.61 THOUSAND/ΜL (ref 0.17–1.22)
MONOCYTES NFR BLD AUTO: 9 % (ref 4–12)
NEUTROPHILS # BLD AUTO: 3.46 THOUSANDS/ΜL (ref 1.85–7.62)
NEUTS SEG NFR BLD AUTO: 52 % (ref 43–75)
NRBC BLD AUTO-RTO: 0 /100 WBCS
PLATELET # BLD AUTO: 273 THOUSANDS/UL (ref 149–390)
PMV BLD AUTO: 9.6 FL (ref 8.9–12.7)
RBC # BLD AUTO: 3.44 MILLION/UL (ref 3.81–5.12)
WBC # BLD AUTO: 6.65 THOUSAND/UL (ref 4.31–10.16)

## 2019-04-03 PROCEDURE — 86140 C-REACTIVE PROTEIN: CPT | Performed by: INTERNAL MEDICINE

## 2019-04-03 PROCEDURE — 99204 OFFICE O/P NEW MOD 45 MIN: CPT | Performed by: PHYSICIAN ASSISTANT

## 2019-04-03 PROCEDURE — 85025 COMPLETE CBC W/AUTO DIFF WBC: CPT | Performed by: FAMILY MEDICINE

## 2019-04-03 RX ORDER — PANTOPRAZOLE SODIUM 40 MG/1
40 TABLET, DELAYED RELEASE ORAL DAILY
Qty: 30 TABLET | Refills: 0 | Status: SHIPPED | OUTPATIENT
Start: 2019-04-03 | End: 2019-04-25 | Stop reason: SDUPTHER

## 2019-04-03 RX ADMIN — HEPARIN SODIUM 5000 UNITS: 5000 INJECTION INTRAVENOUS; SUBCUTANEOUS at 05:08

## 2019-04-03 RX ADMIN — CHOLESTYRAMINE 4 G: 4 POWDER, FOR SUSPENSION ORAL at 08:11

## 2019-04-03 RX ADMIN — RIVASTIGMINE TRANSDERMAL SYSTEM 1 PATCH: 9.5 PATCH, EXTENDED RELEASE TRANSDERMAL at 08:10

## 2019-04-04 ENCOUNTER — TELEPHONE (OUTPATIENT)
Dept: NEUROLOGY | Facility: CLINIC | Age: 79
End: 2019-04-04

## 2019-04-04 ENCOUNTER — TRANSITIONAL CARE MANAGEMENT (OUTPATIENT)
Dept: FAMILY MEDICINE CLINIC | Facility: CLINIC | Age: 79
End: 2019-04-04

## 2019-04-04 DIAGNOSIS — Z91.14 MEDICATION NONADHERENCE DUE TO INTOLERANCE: ICD-10-CM

## 2019-04-04 DIAGNOSIS — F03.90 DEMENTIA WITHOUT BEHAVIORAL DISTURBANCE, UNSPECIFIED DEMENTIA TYPE (HCC): Primary | ICD-10-CM

## 2019-04-11 ENCOUNTER — OFFICE VISIT (OUTPATIENT)
Dept: FAMILY MEDICINE CLINIC | Facility: CLINIC | Age: 79
End: 2019-04-11
Payer: COMMERCIAL

## 2019-04-11 VITALS
TEMPERATURE: 98.3 F | BODY MASS INDEX: 20.2 KG/M2 | HEIGHT: 61 IN | DIASTOLIC BLOOD PRESSURE: 64 MMHG | SYSTOLIC BLOOD PRESSURE: 112 MMHG | OXYGEN SATURATION: 96 % | WEIGHT: 107 LBS | HEART RATE: 68 BPM

## 2019-04-11 DIAGNOSIS — A08.4 GASTROENTERITIS AND COLITIS, VIRAL: ICD-10-CM

## 2019-04-11 DIAGNOSIS — N18.30 CKD (CHRONIC KIDNEY DISEASE) STAGE 3, GFR 30-59 ML/MIN (HCC): ICD-10-CM

## 2019-04-11 DIAGNOSIS — F02.80 DEMENTIA ASSOCIATED WITH OTHER UNDERLYING DISEASE WITHOUT BEHAVIORAL DISTURBANCE (HCC): Primary | ICD-10-CM

## 2019-04-11 PROBLEM — R42 DIZZINESS: Status: RESOLVED | Noted: 2018-12-05 | Resolved: 2019-04-11

## 2019-04-11 PROCEDURE — 1111F DSCHRG MED/CURRENT MED MERGE: CPT | Performed by: NURSE PRACTITIONER

## 2019-04-11 PROCEDURE — 99495 TRANSJ CARE MGMT MOD F2F 14D: CPT | Performed by: NURSE PRACTITIONER

## 2019-04-12 ENCOUNTER — TELEPHONE (OUTPATIENT)
Dept: FAMILY MEDICINE CLINIC | Facility: CLINIC | Age: 79
End: 2019-04-12

## 2019-04-15 ENCOUNTER — OFFICE VISIT (OUTPATIENT)
Dept: NEUROLOGY | Facility: CLINIC | Age: 79
End: 2019-04-15
Payer: COMMERCIAL

## 2019-04-15 ENCOUNTER — TELEPHONE (OUTPATIENT)
Dept: NEUROLOGY | Facility: CLINIC | Age: 79
End: 2019-04-15

## 2019-04-15 VITALS
HEART RATE: 60 BPM | WEIGHT: 105 LBS | BODY MASS INDEX: 19.83 KG/M2 | DIASTOLIC BLOOD PRESSURE: 62 MMHG | HEIGHT: 61 IN | SYSTOLIC BLOOD PRESSURE: 108 MMHG

## 2019-04-15 DIAGNOSIS — F02.80 DEMENTIA ASSOCIATED WITH OTHER UNDERLYING DISEASE WITHOUT BEHAVIORAL DISTURBANCE (HCC): Primary | ICD-10-CM

## 2019-04-15 PROCEDURE — 99204 OFFICE O/P NEW MOD 45 MIN: CPT | Performed by: PSYCHIATRY & NEUROLOGY

## 2019-04-17 ENCOUNTER — OFFICE VISIT (OUTPATIENT)
Dept: GASTROENTEROLOGY | Facility: CLINIC | Age: 79
End: 2019-04-17
Payer: COMMERCIAL

## 2019-04-17 VITALS
HEIGHT: 61 IN | SYSTOLIC BLOOD PRESSURE: 110 MMHG | HEART RATE: 74 BPM | WEIGHT: 106 LBS | BODY MASS INDEX: 20.01 KG/M2 | DIASTOLIC BLOOD PRESSURE: 64 MMHG | RESPIRATION RATE: 16 BRPM

## 2019-04-17 DIAGNOSIS — R11.2 NAUSEA, VOMITING AND DIARRHEA: ICD-10-CM

## 2019-04-17 DIAGNOSIS — F02.80 DEMENTIA ASSOCIATED WITH OTHER UNDERLYING DISEASE WITHOUT BEHAVIORAL DISTURBANCE (HCC): Primary | ICD-10-CM

## 2019-04-17 DIAGNOSIS — R19.7 NAUSEA, VOMITING AND DIARRHEA: ICD-10-CM

## 2019-04-17 PROBLEM — K29.70 GASTRITIS WITHOUT BLEEDING: Status: ACTIVE | Noted: 2019-04-17

## 2019-04-17 PROBLEM — K52.9 COLITIS: Status: ACTIVE | Noted: 2019-04-17

## 2019-04-17 PROCEDURE — 99214 OFFICE O/P EST MOD 30 MIN: CPT | Performed by: PHYSICIAN ASSISTANT

## 2019-04-23 ENCOUNTER — ANESTHESIA EVENT (OUTPATIENT)
Dept: PERIOP | Facility: HOSPITAL | Age: 79
End: 2019-04-23
Payer: COMMERCIAL

## 2019-04-23 RX ORDER — SODIUM CHLORIDE, SODIUM LACTATE, POTASSIUM CHLORIDE, CALCIUM CHLORIDE 600; 310; 30; 20 MG/100ML; MG/100ML; MG/100ML; MG/100ML
125 INJECTION, SOLUTION INTRAVENOUS CONTINUOUS
Status: CANCELLED | OUTPATIENT
Start: 2019-04-23

## 2019-04-24 ENCOUNTER — HOSPITAL ENCOUNTER (OUTPATIENT)
Facility: HOSPITAL | Age: 79
Setting detail: OUTPATIENT SURGERY
Discharge: HOME | End: 2019-04-24
Attending: INTERNAL MEDICINE | Admitting: INTERNAL MEDICINE
Payer: COMMERCIAL

## 2019-04-24 ENCOUNTER — TELEPHONE (OUTPATIENT)
Dept: OTHER | Facility: HOSPITAL | Age: 79
End: 2019-04-24

## 2019-04-24 ENCOUNTER — ANESTHESIA (OUTPATIENT)
Dept: PERIOP | Facility: HOSPITAL | Age: 79
End: 2019-04-24
Payer: COMMERCIAL

## 2019-04-24 VITALS
OXYGEN SATURATION: 96 % | TEMPERATURE: 97.6 F | DIASTOLIC BLOOD PRESSURE: 70 MMHG | SYSTOLIC BLOOD PRESSURE: 110 MMHG | WEIGHT: 102.73 LBS | BODY MASS INDEX: 20.17 KG/M2 | HEIGHT: 60 IN | HEART RATE: 54 BPM | RESPIRATION RATE: 18 BRPM

## 2019-04-24 DIAGNOSIS — K52.9 COLITIS: ICD-10-CM

## 2019-04-24 DIAGNOSIS — K29.70 GASTRITIS WITHOUT BLEEDING, UNSPECIFIED CHRONICITY, UNSPECIFIED GASTRITIS TYPE: ICD-10-CM

## 2019-04-24 DIAGNOSIS — B37.81 ESOPHAGEAL MONILIASIS (HCC): Primary | ICD-10-CM

## 2019-04-24 PROCEDURE — 45385 COLONOSCOPY W/LESION REMOVAL: CPT | Performed by: INTERNAL MEDICINE

## 2019-04-24 PROCEDURE — 88112 CYTOPATH CELL ENHANCE TECH: CPT | Performed by: PATHOLOGY

## 2019-04-24 PROCEDURE — 88305 TISSUE EXAM BY PATHOLOGIST: CPT | Performed by: PATHOLOGY

## 2019-04-24 PROCEDURE — 88342 IMHCHEM/IMCYTCHM 1ST ANTB: CPT | Performed by: PATHOLOGY

## 2019-04-24 PROCEDURE — 43239 EGD BIOPSY SINGLE/MULTIPLE: CPT | Performed by: INTERNAL MEDICINE

## 2019-04-24 RX ORDER — SODIUM CHLORIDE, SODIUM LACTATE, POTASSIUM CHLORIDE, CALCIUM CHLORIDE 600; 310; 30; 20 MG/100ML; MG/100ML; MG/100ML; MG/100ML
INJECTION, SOLUTION INTRAVENOUS CONTINUOUS PRN
Status: DISCONTINUED | OUTPATIENT
Start: 2019-04-24 | End: 2019-04-24 | Stop reason: SURG

## 2019-04-24 RX ORDER — PROPOFOL 10 MG/ML
INJECTION, EMULSION INTRAVENOUS AS NEEDED
Status: DISCONTINUED | OUTPATIENT
Start: 2019-04-24 | End: 2019-04-24 | Stop reason: SURG

## 2019-04-24 RX ORDER — LIDOCAINE HYDROCHLORIDE 10 MG/ML
INJECTION, SOLUTION INFILTRATION; PERINEURAL AS NEEDED
Status: DISCONTINUED | OUTPATIENT
Start: 2019-04-24 | End: 2019-04-24 | Stop reason: SURG

## 2019-04-24 RX ADMIN — SODIUM CHLORIDE, SODIUM LACTATE, POTASSIUM CHLORIDE, AND CALCIUM CHLORIDE: .6; .31; .03; .02 INJECTION, SOLUTION INTRAVENOUS at 10:39

## 2019-04-24 RX ADMIN — PROPOFOL 20 MG: 10 INJECTION, EMULSION INTRAVENOUS at 11:08

## 2019-04-24 RX ADMIN — PROPOFOL 30 MG: 10 INJECTION, EMULSION INTRAVENOUS at 11:13

## 2019-04-24 RX ADMIN — PROPOFOL 30 MG: 10 INJECTION, EMULSION INTRAVENOUS at 11:02

## 2019-04-24 RX ADMIN — LIDOCAINE HYDROCHLORIDE 50 MG: 10 INJECTION, SOLUTION INFILTRATION; PERINEURAL at 10:56

## 2019-04-24 RX ADMIN — PROPOFOL 70 MG: 10 INJECTION, EMULSION INTRAVENOUS at 10:55

## 2019-04-25 DIAGNOSIS — R11.2 NAUSEA & VOMITING: ICD-10-CM

## 2019-04-25 RX ORDER — PANTOPRAZOLE SODIUM 40 MG/1
40 TABLET, DELAYED RELEASE ORAL DAILY
Qty: 30 TABLET | Refills: 2 | Status: SHIPPED | OUTPATIENT
Start: 2019-04-25 | End: 2019-07-22 | Stop reason: SDUPTHER

## 2019-04-26 ENCOUNTER — OFFICE VISIT (OUTPATIENT)
Dept: FAMILY MEDICINE CLINIC | Facility: CLINIC | Age: 79
End: 2019-04-26
Payer: COMMERCIAL

## 2019-04-26 VITALS
SYSTOLIC BLOOD PRESSURE: 130 MMHG | BODY MASS INDEX: 20.34 KG/M2 | DIASTOLIC BLOOD PRESSURE: 72 MMHG | TEMPERATURE: 98.1 F | HEART RATE: 66 BPM | RESPIRATION RATE: 12 BRPM | WEIGHT: 103.6 LBS | HEIGHT: 60 IN

## 2019-04-26 DIAGNOSIS — I10 BENIGN HYPERTENSION: ICD-10-CM

## 2019-04-26 DIAGNOSIS — B37.81 MONILIAL ESOPHAGITIS (HCC): ICD-10-CM

## 2019-04-26 DIAGNOSIS — K29.70 GASTRITIS WITHOUT BLEEDING, UNSPECIFIED CHRONICITY, UNSPECIFIED GASTRITIS TYPE: ICD-10-CM

## 2019-04-26 DIAGNOSIS — F02.80 DEMENTIA ASSOCIATED WITH OTHER UNDERLYING DISEASE WITHOUT BEHAVIORAL DISTURBANCE (HCC): Primary | ICD-10-CM

## 2019-04-26 DIAGNOSIS — D36.9 TUBULAR ADENOMA: ICD-10-CM

## 2019-04-26 PROCEDURE — 3078F DIAST BP <80 MM HG: CPT | Performed by: FAMILY MEDICINE

## 2019-04-26 PROCEDURE — 99214 OFFICE O/P EST MOD 30 MIN: CPT | Performed by: FAMILY MEDICINE

## 2019-04-26 PROCEDURE — 3075F SYST BP GE 130 - 139MM HG: CPT | Performed by: FAMILY MEDICINE

## 2019-05-01 ENCOUNTER — OFFICE VISIT (OUTPATIENT)
Dept: OCCUPATIONAL THERAPY | Facility: CLINIC | Age: 79
End: 2019-05-01
Payer: COMMERCIAL

## 2019-05-01 DIAGNOSIS — F02.80 DEMENTIA ASSOCIATED WITH OTHER UNDERLYING DISEASE WITHOUT BEHAVIORAL DISTURBANCE (HCC): Primary | ICD-10-CM

## 2019-05-01 PROCEDURE — 97165 OT EVAL LOW COMPLEX 30 MIN: CPT

## 2019-05-01 PROCEDURE — 96125 COGNITIVE TEST BY HC PRO: CPT

## 2019-05-02 ENCOUNTER — OFFICE VISIT (OUTPATIENT)
Dept: GASTROENTEROLOGY | Facility: CLINIC | Age: 79
End: 2019-05-02
Payer: COMMERCIAL

## 2019-05-02 VITALS
SYSTOLIC BLOOD PRESSURE: 110 MMHG | BODY MASS INDEX: 20.42 KG/M2 | DIASTOLIC BLOOD PRESSURE: 70 MMHG | WEIGHT: 104 LBS | HEIGHT: 60 IN | HEART RATE: 62 BPM

## 2019-05-02 DIAGNOSIS — R19.5 LOOSE STOOLS: ICD-10-CM

## 2019-05-02 DIAGNOSIS — R15.1 FECAL SMEARING: Primary | ICD-10-CM

## 2019-05-02 PROCEDURE — 99213 OFFICE O/P EST LOW 20 MIN: CPT | Performed by: NURSE PRACTITIONER

## 2019-05-08 ENCOUNTER — APPOINTMENT (OUTPATIENT)
Dept: LAB | Facility: HOSPITAL | Age: 79
End: 2019-05-08
Attending: PSYCHIATRY & NEUROLOGY
Payer: COMMERCIAL

## 2019-05-08 ENCOUNTER — HOSPITAL ENCOUNTER (OUTPATIENT)
Dept: MRI IMAGING | Facility: HOSPITAL | Age: 79
Discharge: HOME | End: 2019-05-08
Attending: PSYCHIATRY & NEUROLOGY
Payer: COMMERCIAL

## 2019-05-08 DIAGNOSIS — F02.80 DEMENTIA ASSOCIATED WITH OTHER UNDERLYING DISEASE WITHOUT BEHAVIORAL DISTURBANCE (HCC): ICD-10-CM

## 2019-05-08 DIAGNOSIS — F02.80 DEMENTIA ASSOCIATED WITH OTHER UNDERLYING DISEASE WITHOUT BEHAVIORAL DISTURBANCE (HCC): Primary | ICD-10-CM

## 2019-05-08 LAB
FOLATE SERPL-MCNC: 13.4 NG/ML (ref 3.1–17.5)
VIT B12 SERPL-MCNC: 331 PG/ML (ref 100–900)

## 2019-05-08 PROCEDURE — 36415 COLL VENOUS BLD VENIPUNCTURE: CPT

## 2019-05-08 PROCEDURE — 70551 MRI BRAIN STEM W/O DYE: CPT

## 2019-05-08 PROCEDURE — 86618 LYME DISEASE ANTIBODY: CPT

## 2019-05-08 PROCEDURE — 82607 VITAMIN B-12: CPT

## 2019-05-08 PROCEDURE — 86592 SYPHILIS TEST NON-TREP QUAL: CPT

## 2019-05-08 PROCEDURE — 86617 LYME DISEASE ANTIBODY: CPT

## 2019-05-08 PROCEDURE — 82746 ASSAY OF FOLIC ACID SERUM: CPT

## 2019-05-09 LAB
B BURGDOR IGG SER IA-ACNC: 0.1
B BURGDOR IGM SER IA-ACNC: 2.07
RPR SER QL: NORMAL

## 2019-05-10 ENCOUNTER — TELEPHONE (OUTPATIENT)
Dept: NEUROLOGY | Facility: CLINIC | Age: 79
End: 2019-05-10

## 2019-05-10 DIAGNOSIS — R93.0 ABNORMAL MRI OF HEAD: Primary | ICD-10-CM

## 2019-05-10 LAB

## 2019-05-13 ENCOUNTER — TELEPHONE (OUTPATIENT)
Dept: NEUROLOGY | Facility: CLINIC | Age: 79
End: 2019-05-13

## 2019-05-13 DIAGNOSIS — G30.1 LATE ONSET ALZHEIMER'S DISEASE WITHOUT BEHAVIORAL DISTURBANCE (HCC): Primary | ICD-10-CM

## 2019-05-13 DIAGNOSIS — F02.80 LATE ONSET ALZHEIMER'S DISEASE WITHOUT BEHAVIORAL DISTURBANCE (HCC): Primary | ICD-10-CM

## 2019-05-13 RX ORDER — MEMANTINE HYDROCHLORIDE 5 MG-10 MG
KIT ORAL
Qty: 1 PACKAGE | Refills: 0 | Status: SHIPPED | OUTPATIENT
Start: 2019-05-13 | End: 2019-06-26 | Stop reason: ALTCHOICE

## 2019-05-13 RX ORDER — MEMANTINE HYDROCHLORIDE 10 MG/1
10 TABLET ORAL 2 TIMES DAILY
Qty: 60 TABLET | Refills: 5 | Status: SHIPPED | OUTPATIENT
Start: 2019-05-13 | End: 2019-06-27 | Stop reason: SDUPTHER

## 2019-05-14 ENCOUNTER — TELEPHONE (OUTPATIENT)
Dept: NEUROLOGY | Facility: CLINIC | Age: 79
End: 2019-05-14

## 2019-05-15 ENCOUNTER — TELEPHONE (OUTPATIENT)
Dept: FAMILY MEDICINE CLINIC | Facility: CLINIC | Age: 79
End: 2019-05-15

## 2019-06-02 DIAGNOSIS — I10 BENIGN HYPERTENSION: ICD-10-CM

## 2019-06-02 RX ORDER — LISINOPRIL 5 MG/1
TABLET ORAL
Qty: 90 TABLET | Refills: 1 | Status: SHIPPED | OUTPATIENT
Start: 2019-06-02 | End: 2019-07-26 | Stop reason: SDUPTHER

## 2019-06-03 ENCOUNTER — APPOINTMENT (OUTPATIENT)
Dept: LAB | Facility: HOSPITAL | Age: 79
End: 2019-06-03
Attending: PSYCHIATRY & NEUROLOGY
Payer: COMMERCIAL

## 2019-06-03 DIAGNOSIS — R93.0 ABNORMAL MRI OF HEAD: ICD-10-CM

## 2019-06-03 LAB
BUN SERPL-MCNC: 22 MG/DL (ref 5–25)
CREAT SERPL-MCNC: 1.14 MG/DL (ref 0.6–1.3)
GFR SERPL CREATININE-BSD FRML MDRD: 45 ML/MIN/1.73SQ M

## 2019-06-03 PROCEDURE — 84520 ASSAY OF UREA NITROGEN: CPT

## 2019-06-03 PROCEDURE — 36415 COLL VENOUS BLD VENIPUNCTURE: CPT

## 2019-06-03 PROCEDURE — 82565 ASSAY OF CREATININE: CPT

## 2019-06-12 ENCOUNTER — HOSPITAL ENCOUNTER (OUTPATIENT)
Dept: MRI IMAGING | Facility: HOSPITAL | Age: 79
Discharge: HOME | End: 2019-06-12
Attending: PSYCHIATRY & NEUROLOGY
Payer: COMMERCIAL

## 2019-06-12 DIAGNOSIS — R93.0 ABNORMAL MRI OF HEAD: ICD-10-CM

## 2019-06-12 PROCEDURE — 70553 MRI BRAIN STEM W/O & W/DYE: CPT

## 2019-06-12 PROCEDURE — A9585 GADOBUTROL INJECTION: HCPCS | Performed by: PSYCHIATRY & NEUROLOGY

## 2019-06-12 RX ADMIN — GADOBUTROL 4 ML: 604.72 INJECTION INTRAVENOUS at 17:02

## 2019-06-13 ENCOUNTER — TELEPHONE (OUTPATIENT)
Dept: NEUROLOGY | Facility: CLINIC | Age: 79
End: 2019-06-13

## 2019-06-14 DIAGNOSIS — R93.0 ABNORMAL MRI OF HEAD: Primary | ICD-10-CM

## 2019-06-26 ENCOUNTER — OFFICE VISIT (OUTPATIENT)
Dept: NEUROLOGY | Facility: CLINIC | Age: 79
End: 2019-06-26
Payer: COMMERCIAL

## 2019-06-26 VITALS
HEART RATE: 64 BPM | WEIGHT: 104.8 LBS | SYSTOLIC BLOOD PRESSURE: 110 MMHG | BODY MASS INDEX: 20.58 KG/M2 | DIASTOLIC BLOOD PRESSURE: 70 MMHG | HEIGHT: 60 IN

## 2019-06-26 DIAGNOSIS — F02.80 DEMENTIA ASSOCIATED WITH OTHER UNDERLYING DISEASE WITHOUT BEHAVIORAL DISTURBANCE (HCC): Primary | ICD-10-CM

## 2019-06-26 PROCEDURE — 99213 OFFICE O/P EST LOW 20 MIN: CPT | Performed by: PSYCHIATRY & NEUROLOGY

## 2019-06-27 DIAGNOSIS — F02.80 LATE ONSET ALZHEIMER'S DISEASE WITHOUT BEHAVIORAL DISTURBANCE (HCC): ICD-10-CM

## 2019-06-27 DIAGNOSIS — G30.1 LATE ONSET ALZHEIMER'S DISEASE WITHOUT BEHAVIORAL DISTURBANCE (HCC): ICD-10-CM

## 2019-06-27 RX ORDER — MEMANTINE HYDROCHLORIDE 10 MG/1
10 TABLET ORAL 2 TIMES DAILY
Qty: 60 TABLET | Refills: 5 | Status: SHIPPED | OUTPATIENT
Start: 2019-06-27 | End: 2019-10-28 | Stop reason: SDUPTHER

## 2019-07-01 ENCOUNTER — TELEPHONE (OUTPATIENT)
Dept: NEUROLOGY | Facility: CLINIC | Age: 79
End: 2019-07-01

## 2019-07-01 DIAGNOSIS — G30.1 LATE ONSET ALZHEIMER'S DISEASE WITHOUT BEHAVIORAL DISTURBANCE (HCC): Primary | ICD-10-CM

## 2019-07-01 DIAGNOSIS — F02.80 DEMENTIA ASSOCIATED WITH OTHER UNDERLYING DISEASE WITHOUT BEHAVIORAL DISTURBANCE (HCC): Primary | ICD-10-CM

## 2019-07-01 DIAGNOSIS — F02.80 LATE ONSET ALZHEIMER'S DISEASE WITHOUT BEHAVIORAL DISTURBANCE (HCC): Primary | ICD-10-CM

## 2019-07-01 NOTE — TELEPHONE ENCOUNTER
Call received from Jaci Alvarez at [facility]  They received referral but do not provide such services listed  They provide more skilled services and not necessarily "supervision" as noted  Patient would need services provided by private duty care services  Call placed to SEBASTIAN to inform her of this  Requested new referral be sent to Jan [facility]  Please re-enter so we can fax  Thank you

## 2019-07-01 NOTE — TELEPHONE ENCOUNTER
pt's daughter Jenna Malone called and is requesting a dr order for home health  as you had recommended that pt not live alone  she is requesting referral be sent to [facility]  She is requesting a call once referral sent    [phone]

## 2019-07-11 NOTE — TELEPHONE ENCOUNTER
Patient's daughter, Enma Rain called back  She didn't know the fax number to [facility]  Faxed home health referral to [facility] @ [phone]

## 2019-07-11 NOTE — TELEPHONE ENCOUNTER
Carline calls to ask new referral to be sent to [address]  Upon further review it looks like she was requesting then for it to be sent to [facility] asking for clarification on where new fax is to be sent

## 2019-07-17 ENCOUNTER — TELEPHONE (OUTPATIENT)
Dept: NEUROLOGY | Facility: CLINIC | Age: 79
End: 2019-07-17

## 2019-07-17 NOTE — TELEPHONE ENCOUNTER
pt's daughter called requesting home health referral be sent to [facility] [place] as [facility] can not service her due to insurance  I called and they state that I can use previous referral for [facility] and write there agency name  I printed referral and orders questions from [facility] that states what type of services we are looking for   Faxed referral  To [phone]  Daughter made aware that referral faxed

## 2019-07-17 NOTE — TELEPHONE ENCOUNTER
Received a call from Nydia Pugh from [facility]  Requesting most recent office notes with updated med list   Faxed to: [phone] as requested

## 2019-07-18 NOTE — TELEPHONE ENCOUNTER
Wendy Leung from [address] called to state that referral with office notes were received  She wanted to confirm that Dr Jeison Lacy will be signing home health progress note orders  Made aware that yes he'd be the once signing since he is the ordering doctor

## 2019-07-18 NOTE — TELEPHONE ENCOUNTER
Bernardino Ruano from [address] [place] [address] Drive called for Dr Saman Mendez NPI and License number to add him to their system  [employer] with that information

## 2019-07-22 DIAGNOSIS — R11.2 NAUSEA & VOMITING: ICD-10-CM

## 2019-07-22 RX ORDER — PANTOPRAZOLE SODIUM 40 MG/1
TABLET, DELAYED RELEASE ORAL
Qty: 30 TABLET | Refills: 2 | Status: SHIPPED | OUTPATIENT
Start: 2019-07-22 | End: 2019-10-22 | Stop reason: SDUPTHER

## 2019-07-26 ENCOUNTER — OFFICE VISIT (OUTPATIENT)
Dept: FAMILY MEDICINE CLINIC | Facility: CLINIC | Age: 79
End: 2019-07-26
Payer: COMMERCIAL

## 2019-07-26 VITALS
HEART RATE: 60 BPM | TEMPERATURE: 98.5 F | DIASTOLIC BLOOD PRESSURE: 70 MMHG | OXYGEN SATURATION: 98 % | HEIGHT: 60 IN | SYSTOLIC BLOOD PRESSURE: 110 MMHG | BODY MASS INDEX: 21.09 KG/M2 | WEIGHT: 107.4 LBS | RESPIRATION RATE: 14 BRPM

## 2019-07-26 DIAGNOSIS — F03.90 DEMENTIA WITHOUT BEHAVIORAL DISTURBANCE, UNSPECIFIED DEMENTIA TYPE (HCC): ICD-10-CM

## 2019-07-26 DIAGNOSIS — I71.2 ASCENDING AORTIC ANEURYSM (HCC): ICD-10-CM

## 2019-07-26 DIAGNOSIS — N18.30 CKD (CHRONIC KIDNEY DISEASE) STAGE 3, GFR 30-59 ML/MIN (HCC): ICD-10-CM

## 2019-07-26 DIAGNOSIS — I10 BENIGN HYPERTENSION: ICD-10-CM

## 2019-07-26 DIAGNOSIS — F02.80 DEMENTIA ASSOCIATED WITH OTHER UNDERLYING DISEASE WITHOUT BEHAVIORAL DISTURBANCE (HCC): Primary | ICD-10-CM

## 2019-07-26 PROBLEM — R19.5 LOOSE STOOLS: Status: RESOLVED | Noted: 2019-05-02 | Resolved: 2019-07-26

## 2019-07-26 PROBLEM — K29.70 GASTRITIS WITHOUT BLEEDING: Status: RESOLVED | Noted: 2019-04-17 | Resolved: 2019-07-26

## 2019-07-26 PROBLEM — A08.4 GASTROENTERITIS AND COLITIS, VIRAL: Status: RESOLVED | Noted: 2019-04-01 | Resolved: 2019-07-26

## 2019-07-26 PROBLEM — K52.9 COLITIS: Status: RESOLVED | Noted: 2019-04-17 | Resolved: 2019-07-26

## 2019-07-26 PROCEDURE — 3074F SYST BP LT 130 MM HG: CPT | Performed by: FAMILY MEDICINE

## 2019-07-26 PROCEDURE — 99214 OFFICE O/P EST MOD 30 MIN: CPT | Performed by: FAMILY MEDICINE

## 2019-07-26 RX ORDER — LISINOPRIL 5 MG/1
5 TABLET ORAL DAILY
Qty: 90 TABLET | Refills: 1 | Status: SHIPPED | OUTPATIENT
Start: 2019-07-26 | End: 2020-01-27

## 2019-07-26 NOTE — PROGRESS NOTES
Assessment/Plan:     Chronic Problems:  Dementia without behavioral disturbance  Stay on the namenda and keep the f/u with Dr Jeison Lacy    CKD (chronic kidney disease) stage 3, GFR 30-59 ml/min (HCC)  Avoid nsaid like motrin and aleve  Ascending aortic aneurysm ([facility] [address] )  Will continue to f/u     Benign hypertension  BP is at goal  Continue the current meds  Visit Diagnosis:  Diagnoses and all orders for this visit:    Dementia associated with other underlying disease without behavioral disturbance    Benign hypertension  -     lisinopril (ZESTRIL) 5 mg tablet; Take 1 tablet (5 mg total) by mouth daily    CKD (chronic kidney disease) stage 3, GFR 30-59 ml/min (HCC)    Dementia without behavioral disturbance, unspecified dementia type  -     aspirin 81 MG tablet; Take 1 tablet (81 mg total) by mouth daily    Ascending aortic aneurysm (HCC)          Subjective:    Patient ID: Hector Dewey is a 78 y o  female  Pt is here with Julee Munoz for f/u on her meds  Was taken off the exelon patch and the diarrhea and gastritis has been gone  No further gas problems the way it was  Was seen by Dr Jeison Lacy and placed on namenda  Now on 10 mg twice daily and feels fine  Slight improvement in the memory but nothing spectacular  Also had an mri of the brain and pituitary gland and 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 and has f/u with neurosurg on th 31st of this month  Feels her appetite is still poor but thinks she gained some weight  Takes all other meds as directed  No side effects noted  Checks her bp's at home and reports they are great  Pt is getting speech therapy  The following portions of the patient's history were reviewed and updated as appropriate: allergies, current medications, past family history, past medical history, past social history, past surgical history and problem list     Review of Systems   Constitutional: Negative for chills, diaphoresis, fatigue and fever  HENT: Negative  Eyes: Negative  Respiratory: Positive for cough (slight )  Negative for shortness of breath and wheezing  Cardiovascular: Negative for chest pain and palpitations  Gastrointestinal: Negative  Genitourinary: Negative  Allergic/Immunologic: Positive for environmental allergies (from the grass)  Neurological: Negative for dizziness, light-headedness and headaches  Psychiatric/Behavioral: Negative for dysphoric mood  The patient is not nervous/anxious  /70   Pulse 60   Temp 98 5 °F (36 9 °C)   Resp 14   Ht 5' (1 524 m)   Wt 48 7 kg (107 lb 6 4 oz)   LMP  (LMP Unknown)   SpO2 98%   BMI 20 98 kg/m²   Social History     Socioeconomic History    Marital status:      Spouse name: Not on file    Number of children: Not on file    Years of education: Not on file    Highest education level: Not on file   Occupational History    Not on file   Social Needs    Financial resource strain: Not on file    Food insecurity:     Worry: Not on file     Inability: Not on file    Transportation needs:     Medical: Not on file     Non-medical: Not on file   Tobacco Use    Smoking status: Never Smoker    Smokeless tobacco: Never Used   Substance and Sexual Activity    Alcohol use:  Yes     Alcohol/week: 7 0 standard drinks     Types: 7 Glasses of wine per week     Comment: socially     Drug use: No    Sexual activity: Never   Lifestyle    Physical activity:     Days per week: Not on file     Minutes per session: Not on file    Stress: Not on file   Relationships    Social connections:     Talks on phone: Not on file     Gets together: Not on file     Attends Muslim service: Not on file     Active member of club or organization: Not on file     Attends meetings of clubs or organizations: Not on file     Relationship status: Not on file    Intimate partner violence:     Fear of current or ex partner: Not on file     Emotionally abused: Not on file     Physically abused: Not on file     Forced sexual activity: Not on file   Other Topics Concern    Not on file   Social History Narrative    Not on file     Past Medical History:   Diagnosis Date    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     Aortic aneurysm (HCC)     4 1 cm on December 6, 2018    CKD (chronic kidney disease) stage 2, GFR 60-89 ml/min     Colon polyps     Dementia     Hyperlipidemia     Hypertension     Insomnia     Memory loss     Osteopenia with elevated frax score     Osteoporosis     Tubular adenoma of colon 04/2019    Uterine fibroid      Family History   Problem Relation Age of Onset    Cancer Mother     Cancer Father      Past Surgical History:   Procedure Laterality Date    COLONOSCOPY  7026    date not certain     FOOT SURGERY      AK COLONOSCOPY FLX DX W/COLLJ SPEC WHEN PFRMD N/A 4/24/2019    Procedure: COLONOSCOPY;  Surgeon: Stephane Miranda MD;  Location: [facility]; Service: Gastroenterology    AK ESOPHAGOGASTRODUODENOSCOPY TRANSORAL DIAGNOSTIC N/A 4/24/2019    Procedure: ESOPHAGOGASTRODUODENOSCOPY (EGD); Surgeon: Stephane Miranda MD;  Location: [facility]; Service: Gastroenterology       Current Outpatient Medications:     lisinopril (ZESTRIL) 5 mg tablet, Take 1 tablet (5 mg total) by mouth daily, Disp: 90 tablet, Rfl: 1    memantine (NAMENDA) 10 mg tablet, Take 1 tablet (10 mg total) by mouth 2 (two) times a day, Disp: 60 tablet, Rfl: 5    pantoprazole (PROTONIX) 40 mg tablet, TAKE ONE TABLET BY MOUTH EVERY DAY, Disp: 30 tablet, Rfl: 2    aspirin 81 MG tablet, Take 1 tablet (81 mg total) by mouth daily, Disp: , Rfl:     Allergies   Allergen Reactions    Cefazolin      Other reaction(s): Unknown          Lab Review   Appointment on 06/03/2019   Component Date Value    BUN 06/03/2019 22     Creatinine 06/03/2019 1 14     eGFR 06/03/2019 45         Imaging: No results found  Objective:     Physical Exam   Constitutional: She is oriented to person, place, and time  She appears well-developed and well-nourished  No distress  HENT:   Head: Normocephalic and atraumatic  Right Ear: External ear normal    Left Ear: External ear normal    Mouth/Throat: Oropharynx is clear and moist    Eyes: Pupils are equal, round, and reactive to light  Conjunctivae and EOM are normal  Right eye exhibits no discharge  Left eye exhibits no discharge  Neck: Normal range of motion  Neck supple  Cardiovascular: Normal rate, regular rhythm and normal heart sounds  Exam reveals no friction rub  No murmur heard  Pulmonary/Chest: Effort normal and breath sounds normal  No respiratory distress  She has no wheezes  She has no rales  Musculoskeletal: Normal range of motion  She exhibits no edema, tenderness or deformity  Lymphadenopathy:     She has no cervical adenopathy  Neurological: She is alert and oriented to person, place, and time  No cranial nerve deficit  Skin: Skin is warm and dry  No rash noted  She is not diaphoretic  Psychiatric: She has a normal mood and affect  Her behavior is normal  Judgment and thought content normal          Patient Instructions   Discussed all with patient and her stepdaughter  She seems to be doing very well in gained 4 lb however I would like to see an average weight of about 115 for her  I do not expect you to gain it all at once but slowly  the amount of calories your eating every day for some storage to fight infection  Continue on your current medications for now if all is well will see you back again in 4 months  DASIA Davies    Portions of the record may have been created with voice recognition software   Occasional wrong word or "sound a like" substitutions may have occurred due to the inherent limitations of voice recognition software   Read the chart carefully and recognize, using context, where substitutions have occurred

## 2019-07-26 NOTE — PATIENT INSTRUCTIONS
Discussed all with patient and her stepdaughter  She seems to be doing very well in gained 4 lb however I would like to see an average weight of about 115 for her  I do not expect you to gain it all at once but slowly  the amount of calories your eating every day for some storage to fight infection  Continue on your current medications for now if all is well will see you back again in 4 months

## 2019-07-31 ENCOUNTER — TRANSCRIBE ORDERS (OUTPATIENT)
Dept: NEUROSURGERY | Facility: CLINIC | Age: 79
End: 2019-07-31

## 2019-07-31 ENCOUNTER — OFFICE VISIT (OUTPATIENT)
Dept: NEUROSURGERY | Facility: CLINIC | Age: 79
End: 2019-07-31
Payer: COMMERCIAL

## 2019-07-31 VITALS
SYSTOLIC BLOOD PRESSURE: 102 MMHG | BODY MASS INDEX: 21.2 KG/M2 | HEIGHT: 60 IN | RESPIRATION RATE: 16 BRPM | HEART RATE: 68 BPM | DIASTOLIC BLOOD PRESSURE: 68 MMHG | TEMPERATURE: 98 F | WEIGHT: 108 LBS

## 2019-07-31 DIAGNOSIS — E23.7 PITUITARY ABNORMALITY (HCC): Primary | ICD-10-CM

## 2019-07-31 DIAGNOSIS — Z01.818 PRE-PROCEDURAL EXAMINATION: Primary | ICD-10-CM

## 2019-07-31 DIAGNOSIS — R93.0 ABNORMAL MRI OF HEAD: ICD-10-CM

## 2019-07-31 PROCEDURE — 99203 OFFICE O/P NEW LOW 30 MIN: CPT | Performed by: PHYSICIAN ASSISTANT

## 2019-07-31 NOTE — LETTER
July 31, 2019     Aura Peace, [address] [address]    Patient: Prince Cee   YOB: 1940   Date of Visit: 7/31/2019       Dear Dr Favio Rubio:    Thank you for referring Samir Brought to me for evaluation  Below are my notes for this consultation  If you have questions, please do not hesitate to call me  I look forward to following your patient along with you  Sincerely,        Lexi Barrios MD        CC: MD Maren Parks PA-C  7/31/2019  2:17 PM  Sign at close encounter  Patient ID: Prince Cee is a 78 y o  female  Diagnoses and all orders for this visit:    Pituitary abnormality ([facility] [address] )  -     MRI brain pituitary wo and w contrast; Future  -     Insulin-like growth factor 1 (IGF-1); Future  -     TSH, 3rd generation; Future  -     T4, free; Future  -     ACTH; Future  -     Cortisol Level, AM Specimen; Future  -     Alpha Subunit, Free; Future  -     Prolactin; Future  -     Follicle stimulating hormone; Future  -     Luteinizing hormone; Future  -     Basic metabolic panel; Future    Abnormal MRI of head  -     Ambulatory referral to Neurosurgery  -     MRI brain pituitary wo and w contrast; Future  -     Insulin-like growth factor 1 (IGF-1); Future  -     TSH, 3rd generation; Future  -     T4, free; Future  -     ACTH; Future  -     Cortisol Level, AM Specimen; Future  -     Alpha Subunit, Free; Future  -     Prolactin; Future  -     Follicle stimulating hormone; Future  -     Luteinizing hormone; Future  -     Basic metabolic panel; Future        Assessment/Plan:    Very pleasant 78-year-old female, accompanied by her daughter-in-law, referred by Neurology, Dr Richard Hobson for abnormal MRI, pituitary mass  Patient was being evaluated for late onset dementia/Alzheimer's  Patient underwent a NeuroQuant MRI, incidental note was made of a pituitary mass      She subsequently had a MRI brain with pituitary protocol 5/10/19, note was made of a well-circumscribed subcentimeter rounded lesion within the right posterior aspect of the pituitary gland  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 verses a 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  She denies prior history of MRI of the brain or CT scan of the brain, in her lifetime  She denies headache, vision changes and/or loss of peripheral vision, nausea, weakness, cold intolerance, change in urinary output, unintended weight loss or weight gain, tachycardia or hypertension change in skin texture or contour, elevated blood glucose, change in hair distribution or character, or change in bowel habits  She also denies gait or balance disturbance, motor or sensory difficulties in the upper or lower extremities, bowel or bladder incontinence, difficulty with executive function  On examination today there are no focal neurologic deficits identified; she has no pronator drift, rapid alternating movements are intact, motor exam of the upper and lower extremities are 5 x 5 for power, reflexes, triceps, biceps, patella and Achilles are +2 and symmetric, sensation to light touch is intact throughout the upper and lower extremities, finger-nose is intact, Romberg is negative  Chief Complaint  Referred by her neurologist to evaluate abnormal MRI of the brain, pituitary lesion  The following portions of the patient's history were reviewed and updated as appropriate: allergies, current medications, past family history, past medical history, past social history, past surgical history and problem list     Review of Systems   Constitutional: Negative  HENT: Negative  Eyes: Negative  Respiratory: Negative  Cardiovascular: Negative  Gastrointestinal: Negative  Endocrine: Negative  Genitourinary: Negative  Musculoskeletal: Negative  Skin: Negative  Allergic/Immunologic: Negative  Neurological: Negative  Hematological: Negative      Psychiatric/Behavioral: Positive for confusion (AND FORGETFULNESS) and decreased concentration  All other systems reviewed and are negative  Objective:    Physical Exam   Constitutional: She is oriented to person, place, and time  She appears well-developed and well-nourished  HENT:   Head: Normocephalic and atraumatic  Eyes: Pupils are equal, round, and reactive to light  EOM are normal    Cardiovascular: Normal rate  Pulmonary/Chest: Effort normal and breath sounds normal    Neurological: She is alert and oriented to person, place, and time  Skin: Skin is warm and dry  Psychiatric: She has a normal mood and affect  Vitals reviewed  Neurologic Exam     Mental Status   Oriented to person, place, and time  Cranial Nerves     CN III, IV, VI   Pupils are equal, round, and reactive to light  Extraocular motions are normal         MRI BRAIN AND SELLA  WITH AND WITHOUT CONTRAST  5/10/19     INDICATION:  R93 0: Abnormal findings on diagnostic imaging of skull and head, not elsewhere classified     COMPARISON: MRI from 5/8/2019      TECHNIQUE:  Brain: Axial diffusion-weighted imaging  Axial FLAIR and axial T2  Axial gradient  Axial T1 postcontrast Axial bravo postcontrast   Sella: Sagittal and coronal T1  Coronal T2  Sagittal and coronal T1 postcontrast with fat suppression  Coronal dynamic enhancement      Targeted images of the sella were performed requiring additional time at acquisition and interpretation of approximately 25%        IV Contrast:  4 mL of gadobutrol injection (MULTI-DOSE)      IMAGE QUALITY:  Diagnostic      FINDINGS:     BRAIN PARENCHYMA:  There is no evidence of recent infarction on diffusion-weighted imaging  There is no intracranial hemorrhage  There is no abnormal parenchymal or leptomeningeal enhancement identified      There is age-related involutional change  There is moderate periventricular, subcortical and deep white matter chronic microvascular ischemic change   There are prominent perivascular spaces bilaterally  There are chronic appearing lacunar infarcts within   bilateral cerebellar hemispheres      There is redemonstration of scattered foci of susceptibility within the cerebral hemispheres, primarily in a peripheral distribution      SELLA AND PITUITARY GLAND:  There is a well-circumscribed 6 x 6 mm rounded lesion within the right posterior aspect of the pituitary gland  The lesion is inseparable from the posterior pituitary bright spot and slightly off midline  There is T1   shortening noted within the lesion with corresponding hypoenhancement on dynamic and delayed imaging  Findings correspond to the abnormality identified on recent noncontrast MRI of the brain      ORBITS:  Normal      PARANASAL SINUSES:  Normal      VASCULATURE:  Evaluation of the major intracranial vasculature demonstrates appropriate flow voids      CALVARIUM AND SKULL BASE:  Normal      EXTRACRANIAL SOFT TISSUES:  Normal      IMPRESSION:     Well-circumscribed subcentimeter rounded lesion within the right posterior aspect of the pituitary gland, as described above  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

## 2019-07-31 NOTE — PATIENT INSTRUCTIONS
Proceed for blood test as discussed in the next few days  Will call with results of laboratory tests when completed  Further follow-up with Neurosurgery in approximately 6 months, Dr Bhumika Dozier [facility])  MRI brain with without contrast a week or so prior to follow-up visit in 6 months      From a neurosurgical perspective continue all usual activities without restriction

## 2019-07-31 NOTE — PROGRESS NOTES
Patient ID: Riccardo Odonnell is a 78 y o  female  Diagnoses and all orders for this visit:    Pituitary abnormality ([facility] [address] )  -     MRI brain pituitary wo and w contrast; Future  -     Insulin-like growth factor 1 (IGF-1); Future  -     TSH, 3rd generation; Future  -     T4, free; Future  -     ACTH; Future  -     Cortisol Level, AM Specimen; Future  -     Alpha Subunit, Free; Future  -     Prolactin; Future  -     Follicle stimulating hormone; Future  -     Luteinizing hormone; Future  -     Basic metabolic panel; Future    Abnormal MRI of head  -     Ambulatory referral to Neurosurgery  -     MRI brain pituitary wo and w contrast; Future  -     Insulin-like growth factor 1 (IGF-1); Future  -     TSH, 3rd generation; Future  -     T4, free; Future  -     ACTH; Future  -     Cortisol Level, AM Specimen; Future  -     Alpha Subunit, Free; Future  -     Prolactin; Future  -     Follicle stimulating hormone; Future  -     Luteinizing hormone; Future  -     Basic metabolic panel; Future        Assessment/Plan:    Very pleasant 66-year-old female, accompanied by her daughter-in-law, referred by Neurology, Dr Lencho Oro for abnormal MRI, pituitary mass  Patient was being evaluated for late onset dementia/Alzheimer's  Patient underwent a NeuroQuant MRI, incidental note was made of a pituitary mass  She subsequently had a MRI brain with pituitary protocol 5/10/19, note was made of a well-circumscribed subcentimeter rounded lesion within the right posterior aspect of the pituitary gland  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 verses a 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  She denies prior history of MRI of the brain or CT scan of the brain, in her lifetime      She denies headache, vision changes and/or loss of peripheral vision, nausea, weakness, cold intolerance, change in urinary output, unintended weight loss or weight gain, tachycardia or hypertension change in skin texture or contour, elevated blood glucose, change in hair distribution or character, or change in bowel habits  She also denies gait or balance disturbance, motor or sensory difficulties in the upper or lower extremities, bowel or bladder incontinence, difficulty with executive function  On examination today there are no focal neurologic deficits identified; she has no pronator drift, rapid alternating movements are intact, motor exam of the upper and lower extremities are 5 x 5 for power, reflexes, triceps, biceps, patella and Achilles are +2 and symmetric, sensation to light touch is intact throughout the upper and lower extremities, finger-nose is intact, Romberg is negative  Chief Complaint  Referred by her neurologist to evaluate abnormal MRI of the brain, pituitary lesion  The following portions of the patient's history were reviewed and updated as appropriate: allergies, current medications, past family history, past medical history, past social history, past surgical history and problem list     Review of Systems   Constitutional: Negative  HENT: Negative  Eyes: Negative  Respiratory: Negative  Cardiovascular: Negative  Gastrointestinal: Negative  Endocrine: Negative  Genitourinary: Negative  Musculoskeletal: Negative  Skin: Negative  Allergic/Immunologic: Negative  Neurological: Negative  Hematological: Negative  Psychiatric/Behavioral: Positive for confusion (AND FORGETFULNESS) and decreased concentration  All other systems reviewed and are negative  Objective:    Physical Exam   Constitutional: She is oriented to person, place, and time  She appears well-developed and well-nourished  HENT:   Head: Normocephalic and atraumatic  Eyes: Pupils are equal, round, and reactive to light  EOM are normal    Cardiovascular: Normal rate  Pulmonary/Chest: Effort normal and breath sounds normal    Neurological: She is alert and oriented to person, place, and time  Skin: Skin is warm and dry     Psychiatric: She has a normal mood and affect  Vitals reviewed  Neurologic Exam     Mental Status   Oriented to person, place, and time  Cranial Nerves     CN III, IV, VI   Pupils are equal, round, and reactive to light  Extraocular motions are normal         MRI BRAIN AND SELLA  WITH AND WITHOUT CONTRAST  5/10/19     INDICATION:  R93 0: Abnormal findings on diagnostic imaging of skull and head, not elsewhere classified     COMPARISON: MRI from 5/8/2019      TECHNIQUE:  Brain: Axial diffusion-weighted imaging  Axial FLAIR and axial T2  Axial gradient  Axial T1 postcontrast Axial bravo postcontrast   Sella: Sagittal and coronal T1  Coronal T2  Sagittal and coronal T1 postcontrast with fat suppression  Coronal dynamic enhancement      Targeted images of the sella were performed requiring additional time at acquisition and interpretation of approximately 25%        IV Contrast:  4 mL of gadobutrol injection (MULTI-DOSE)      IMAGE QUALITY:  Diagnostic      FINDINGS:     BRAIN PARENCHYMA:  There is no evidence of recent infarction on diffusion-weighted imaging  There is no intracranial hemorrhage  There is no abnormal parenchymal or leptomeningeal enhancement identified      There is age-related involutional change  There is moderate periventricular, subcortical and deep white matter chronic microvascular ischemic change  There are prominent perivascular spaces bilaterally  There are chronic appearing lacunar infarcts within   bilateral cerebellar hemispheres      There is redemonstration of scattered foci of susceptibility within the cerebral hemispheres, primarily in a peripheral distribution      SELLA AND PITUITARY GLAND:  There is a well-circumscribed 6 x 6 mm rounded lesion within the right posterior aspect of the pituitary gland  The lesion is inseparable from the posterior pituitary bright spot and slightly off midline   There is T1   shortening noted within the lesion with corresponding hypoenhancement on dynamic and delayed imaging  Findings correspond to the abnormality identified on recent noncontrast MRI of the brain      ORBITS:  Normal      PARANASAL SINUSES:  Normal      VASCULATURE:  Evaluation of the major intracranial vasculature demonstrates appropriate flow voids      CALVARIUM AND SKULL BASE:  Normal      EXTRACRANIAL SOFT TISSUES:  Normal      IMPRESSION:     Well-circumscribed subcentimeter rounded lesion within the right posterior aspect of the pituitary gland, as described above  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

## 2019-08-05 ENCOUNTER — APPOINTMENT (OUTPATIENT)
Dept: LAB | Facility: HOSPITAL | Age: 79
End: 2019-08-05
Payer: COMMERCIAL

## 2019-08-05 DIAGNOSIS — E23.7 PITUITARY ABNORMALITY (HCC): ICD-10-CM

## 2019-08-05 DIAGNOSIS — R93.0 ABNORMAL MRI OF HEAD: ICD-10-CM

## 2019-08-05 LAB
ANION GAP SERPL CALCULATED.3IONS-SCNC: 9 MMOL/L (ref 4–13)
BUN SERPL-MCNC: 22 MG/DL (ref 5–25)
CALCIUM SERPL-MCNC: 9.5 MG/DL (ref 8.3–10.1)
CHLORIDE SERPL-SCNC: 101 MMOL/L (ref 100–108)
CO2 SERPL-SCNC: 28 MMOL/L (ref 21–32)
CORTIS AM PEAK SERPL-MCNC: 18.8 UG/DL (ref 4.2–22.4)
CREAT SERPL-MCNC: 1.42 MG/DL (ref 0.6–1.3)
FSH SERPL-ACNC: 102.3 MIU/ML
GFR SERPL CREATININE-BSD FRML MDRD: 35 ML/MIN/1.73SQ M
GLUCOSE P FAST SERPL-MCNC: 143 MG/DL (ref 65–99)
LH SERPL-ACNC: 38 MIU/ML
POTASSIUM SERPL-SCNC: 4.3 MMOL/L (ref 3.5–5.3)
PROLACTIN SERPL-MCNC: 8 NG/ML
SODIUM SERPL-SCNC: 138 MMOL/L (ref 136–145)
T4 FREE SERPL-MCNC: 0.9 NG/DL (ref 0.76–1.46)
TSH SERPL DL<=0.05 MIU/L-ACNC: 4.47 UIU/ML (ref 0.36–3.74)

## 2019-08-05 PROCEDURE — 80048 BASIC METABOLIC PNL TOTAL CA: CPT

## 2019-08-05 PROCEDURE — 83002 ASSAY OF GONADOTROPIN (LH): CPT

## 2019-08-05 PROCEDURE — 84443 ASSAY THYROID STIM HORMONE: CPT

## 2019-08-05 PROCEDURE — 84146 ASSAY OF PROLACTIN: CPT

## 2019-08-05 PROCEDURE — 84305 ASSAY OF SOMATOMEDIN: CPT

## 2019-08-05 PROCEDURE — 36415 COLL VENOUS BLD VENIPUNCTURE: CPT

## 2019-08-05 PROCEDURE — 82533 TOTAL CORTISOL: CPT

## 2019-08-05 PROCEDURE — 82024 ASSAY OF ACTH: CPT

## 2019-08-05 PROCEDURE — 83520 IMMUNOASSAY QUANT NOS NONAB: CPT

## 2019-08-05 PROCEDURE — 84439 ASSAY OF FREE THYROXINE: CPT

## 2019-08-05 PROCEDURE — 83001 ASSAY OF GONADOTROPIN (FSH): CPT

## 2019-08-06 LAB — ACTH PLAS-MCNC: 39.1 PG/ML (ref 7.2–63.3)

## 2019-08-07 LAB — IGF-I SERPL-MCNC: 90 NG/ML (ref 34–172)

## 2019-08-08 ENCOUNTER — TELEPHONE (OUTPATIENT)
Dept: NEUROSURGERY | Facility: CLINIC | Age: 79
End: 2019-08-08

## 2019-08-08 NOTE — TELEPHONE ENCOUNTER
Spoke with Ms Misael Talavera relative to her mother-in-law has laboratory studies, reported that her pituitary laboratory panel was complete and essentially all within normal limits  Keep planned follow-up in approximately 6 months for repeat MRI brain with pituitary protocol

## 2019-08-11 LAB — ALPHA SUBUNIT FREE SERPL-MCNC: 0.77 NG/ML

## 2019-09-09 ENCOUNTER — TELEPHONE (OUTPATIENT)
Dept: NEUROLOGY | Facility: CLINIC | Age: 79
End: 2019-09-09

## 2019-10-22 DIAGNOSIS — R11.2 NAUSEA & VOMITING: ICD-10-CM

## 2019-10-22 RX ORDER — PANTOPRAZOLE SODIUM 40 MG/1
TABLET, DELAYED RELEASE ORAL
Qty: 30 TABLET | Refills: 2 | Status: SHIPPED | OUTPATIENT
Start: 2019-10-22 | End: 2019-11-19 | Stop reason: SDUPTHER

## 2019-10-28 ENCOUNTER — OFFICE VISIT (OUTPATIENT)
Dept: NEUROLOGY | Facility: CLINIC | Age: 79
End: 2019-10-28
Payer: COMMERCIAL

## 2019-10-28 VITALS
DIASTOLIC BLOOD PRESSURE: 60 MMHG | BODY MASS INDEX: 22.46 KG/M2 | SYSTOLIC BLOOD PRESSURE: 104 MMHG | HEART RATE: 72 BPM | WEIGHT: 114.4 LBS | HEIGHT: 60 IN

## 2019-10-28 DIAGNOSIS — F02.80 LATE ONSET ALZHEIMER'S DISEASE WITHOUT BEHAVIORAL DISTURBANCE (HCC): ICD-10-CM

## 2019-10-28 DIAGNOSIS — F02.80 DEMENTIA ASSOCIATED WITH OTHER UNDERLYING DISEASE WITHOUT BEHAVIORAL DISTURBANCE (HCC): Primary | ICD-10-CM

## 2019-10-28 DIAGNOSIS — G30.1 LATE ONSET ALZHEIMER'S DISEASE WITHOUT BEHAVIORAL DISTURBANCE (HCC): ICD-10-CM

## 2019-10-28 PROCEDURE — 99214 OFFICE O/P EST MOD 30 MIN: CPT | Performed by: PSYCHIATRY & NEUROLOGY

## 2019-10-28 RX ORDER — MEMANTINE HYDROCHLORIDE 10 MG/1
10 TABLET ORAL 2 TIMES DAILY
Qty: 180 TABLET | Refills: 1 | Status: SHIPPED | OUTPATIENT
Start: 2019-10-28 | End: 2020-02-28 | Stop reason: SDUPTHER

## 2019-10-28 NOTE — PROGRESS NOTES
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 and has a follow-up appointment and an MRI scan, she seems to be tolerating Namenda 10 mg twice a day well and her memory is stable, she has not tolerated Aricept and Exelon in the past, I discussed with her stepdaughter her regarding neuropsych testing, they would let me know if she is interested, also I discussed with them the importance that she should be with somebody all the time for safety reasons, family is going to look into that she is currently not driving as she failed the driving adopt ability test, she was advised mentally stimulating exercises, to go to the hospital if has any worsening symptoms and call me, otherwise to see me back in 2 months and follow up with her other physicians  Subjective:    HPI   Patient is here in follow-up for her memory difficulty, she is accompanied with her stepdaughter, since her last visit she is doing good, she is able to do her ADLs, she does not drive and does not use the Gas stove, she has gained a few lb, she denies any side effects to Namenda, no nausea or vomiting, no headaches no vision or speech difficulty, sleep is good, mood is good, no hallucinations, no other complaints      Vitals:    10/28/19 1114   BP: 104/60   BP Location: Left arm   Patient Position: Sitting   Cuff Size: Adult   Pulse: 72   Weight: 51 9 kg (114 lb 6 4 oz)   Height: 5' (1 524 m)       Current Medications    Current Outpatient Medications:     aspirin 81 MG tablet, Take 1 tablet (81 mg total) by mouth daily, Disp: , Rfl:     lisinopril (ZESTRIL) 5 mg tablet, Take 1 tablet (5 mg total) by mouth daily, Disp: 90 tablet, Rfl: 1    memantine (NAMENDA) 10 mg tablet, Take 1 tablet (10 mg total) by mouth 2 (two) times a day, Disp: 60 tablet, Rfl: 5    pantoprazole (PROTONIX) 40 mg tablet, TAKE ONE TABLET BY MOUTH EVERY DAY, Disp: 30 tablet, Rfl: 2      Allergies  Cefazolin    Past Medical History  Past Medical History:   Diagnosis Date    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     Aortic aneurysm (HCC)     4 1 cm on December 6, 2018    CKD (chronic kidney disease) stage 2, GFR 60-89 ml/min     Colon polyps     Dementia (HCC)     Hyperlipidemia     Hypertension     Insomnia     Memory loss     Osteopenia with elevated frax score     Osteoporosis     Tubular adenoma of colon 04/2019    Uterine fibroid          Past Surgical History:  Past Surgical History:   Procedure Laterality Date    COLONOSCOPY  1959    date not certain     FOOT SURGERY      [place] COLONOSCOPY FLX DX W/COLLJ SPEC WHEN PFRMD N/A 4/24/2019    Procedure: COLONOSCOPY;  Surgeon: Kelsey Floyd MD;  [facility]; Service: Gastroenterology    [place] ESOPHAGOGASTRODUODENOSCOPY TRANSORAL DIAGNOSTIC N/A 4/24/2019    Procedure: ESOPHAGOGASTRODUODENOSCOPY (EGD); Surgeon: Kelsey Floyd MD;  [facility]; Service: Gastroenterology         Family History:  Family History   Problem Relation Age of Onset    Cancer Mother     Cancer Father        Social History:   reports that she has never smoked  She has never used smokeless tobacco  She reports that she drinks about 7 0 standard drinks of alcohol per week  She reports that she does not use drugs  I have reviewed the past medical history, surgical history, social and family history, current medications, allergies vitals, review of systems, and updated this information as appropriate today  Objective:    Physical Exam    Neurological Exam    GENERAL:  Cooperative in no acute distress   Well-developed and well-nourished    HEAD and NECK   Head is atraumatic normocephalic with no lesions or masses  Neck is supple with full range of motion    CARDIOVASCULAR  Carotid Arteries-no carotid bruits  NEUROLOGIC:  Mental Status-the patient is awake alert and oriented without aphasia or apraxia, Garden is 22/30  Cranial Nerves: Visual fields are full to confrontation  Extraocular movements are full without nystagmus  Pupils are 2-1/2 mm and reactive  Face is symmetrical to light touch  Movements of facial expression move symmetrically  Hearing is normal to finger rub bilaterally  Soft palate lifts symmetrically  Shoulder shrug is symmetrical  Tongue is midline without atrophy  Motor: No drift is noted on arm extension  Strength is full in the upper and lower extremities with normal bulk and tone  Gait reveals a normal base with symmetrical arm swing  Reflexes:     1+ and symmetrical            ROS:  Review of Systems   Constitutional: Negative  Negative for appetite change, chills, fatigue and fever  HENT: Negative  Negative for hearing loss, tinnitus, trouble swallowing and voice change  Eyes: Negative  Negative for photophobia and pain  Respiratory: Negative  Negative for shortness of breath and wheezing  Cardiovascular: Negative  Negative for chest pain and palpitations  Gastrointestinal: Negative  Negative for nausea and vomiting  Endocrine: Negative  Negative for cold intolerance and heat intolerance  Genitourinary: Negative  Negative for dysuria, frequency and urgency  Musculoskeletal: Negative  Negative for arthralgias, back pain, gait problem, myalgias, neck pain and neck stiffness  Skin: Negative  Negative for rash  Allergic/Immunologic: Negative  Neurological: Negative  Negative for dizziness, tremors, seizures, syncope, facial asymmetry, speech difficulty, weakness, light-headedness, numbness and headaches  Hematological: Negative  Does not bruise/bleed easily  Psychiatric/Behavioral: Positive for decreased concentration (memory)  Negative for confusion, hallucinations and sleep disturbance

## 2019-11-19 ENCOUNTER — OFFICE VISIT (OUTPATIENT)
Dept: FAMILY MEDICINE CLINIC | Facility: CLINIC | Age: 79
End: 2019-11-19
Payer: COMMERCIAL

## 2019-11-19 VITALS
BODY MASS INDEX: 22.74 KG/M2 | RESPIRATION RATE: 14 BRPM | HEART RATE: 80 BPM | SYSTOLIC BLOOD PRESSURE: 114 MMHG | WEIGHT: 115.8 LBS | OXYGEN SATURATION: 98 % | HEIGHT: 60 IN | TEMPERATURE: 98.9 F | DIASTOLIC BLOOD PRESSURE: 70 MMHG

## 2019-11-19 DIAGNOSIS — I10 BENIGN HYPERTENSION: ICD-10-CM

## 2019-11-19 DIAGNOSIS — R11.2 NAUSEA & VOMITING: ICD-10-CM

## 2019-11-19 DIAGNOSIS — R79.89 ELEVATED TSH: ICD-10-CM

## 2019-11-19 DIAGNOSIS — I71.2 ASCENDING AORTIC ANEURYSM (HCC): Primary | ICD-10-CM

## 2019-11-19 DIAGNOSIS — E23.6 PITUITARY MASS (HCC): ICD-10-CM

## 2019-11-19 DIAGNOSIS — N18.30 CKD (CHRONIC KIDNEY DISEASE) STAGE 3, GFR 30-59 ML/MIN (HCC): ICD-10-CM

## 2019-11-19 DIAGNOSIS — Z13.220 SCREENING, LIPID: ICD-10-CM

## 2019-11-19 PROCEDURE — 1160F RVW MEDS BY RX/DR IN RCRD: CPT | Performed by: FAMILY MEDICINE

## 2019-11-19 PROCEDURE — 3725F SCREEN DEPRESSION PERFORMED: CPT | Performed by: FAMILY MEDICINE

## 2019-11-19 PROCEDURE — 99214 OFFICE O/P EST MOD 30 MIN: CPT | Performed by: FAMILY MEDICINE

## 2019-11-19 RX ORDER — PANTOPRAZOLE SODIUM 40 MG/1
40 TABLET, DELAYED RELEASE ORAL DAILY
Qty: 90 TABLET | Refills: 1 | Status: SHIPPED | OUTPATIENT
Start: 2019-11-19 | End: 2020-04-22

## 2019-11-19 NOTE — PATIENT INSTRUCTIONS
Discussed all with patient and her stepdaughter  She is doing exceptionally well continue all medications  Blood pressure is at goal   Please have the lab work done prior to the next appointment in 3-4 months  Keep your appointment with the neurosurgeon and Dr Fariba Tidwell  Had her flu shot

## 2019-11-19 NOTE — PROGRESS NOTES
Assessment/Plan:     Chronic Problems:  Ascending aortic aneurysm [facility])  Will repeat next December 2020  Benign hypertension  BP is at goal  Continue the current meds  Pituitary mass ([address] )  Keep the f/u with neurosurg    CKD (chronic kidney disease) stage 3, GFR 30-59 ml/min ([facility])  Will continue to monitor  Visit Diagnosis:  Diagnoses and all orders for this visit:    Ascending aortic aneurysm ([facility] [address] )    Benign hypertension    CKD (chronic kidney disease) stage 3, GFR 30-59 ml/min (HCC)  -     Microalbumin / creatinine urine ratio  -     Urinalysis with microscopic    Pituitary mass (HCC)    Elevated TSH  -     TSH, 3rd generation with Free T4 reflex; Future    Screening, lipid  -     Comprehensive metabolic panel; Future  -     Lipid panel; Future    Nausea & vomiting  -     pantoprazole (PROTONIX) 40 mg tablet; Take 1 tablet (40 mg total) by mouth daily          Subjective:    Patient ID: Mehrdad Lam is a 78 y o  female  Pt is here ac by her step daughter Lucía Marie for routine f/u appt  Pt is gaining weight and moved up her size from 4 to 6  Gained 8 lbs since the last visit  Thinks she may have pulled a groin muscle on the right leg  Pain is worse with the starirs  Started a while ago and happens intermittently  Takes tylenol for the pain  Feels the tylenol helps and she also takes 1 prior to bed which helps with the sleep  Pt has company from her sisters visiting nurses and also Christinfelicitas Cynthia will take her to her sisters house  Lucía Marie is working with [employer] to take her to adult   Meds reviewed with Lucía Marie and current  Takes all other meds as directed  No side effects noted  Had her flu shot  Pt has f/u with neurosurg in January for ? pituitary adenoma         The following portions of the patient's history were reviewed and updated as appropriate: allergies, current medications, past family history, past medical history, past social history, past surgical history and problem list     Review of Systems   Constitutional: Negative for chills, diaphoresis, fatigue and fever  HENT: Positive for rhinorrhea  Negative for congestion, ear pain, postnasal drip, sinus pressure, sinus pain and sore throat  Eyes: Negative  Respiratory: Positive for cough (very dry  Feels this is not an allergy  Sister has it also  )  Negative for shortness of breath and wheezing  Cardiovascular: Negative for chest pain and palpitations  Gastrointestinal: Negative  No further diarrhea   Genitourinary: Negative for dysuria, frequency and urgency  Sometimes has stress incontinence  Musculoskeletal: Negative for arthralgias, back pain and myalgias (in the right anterior thigh)  Neurological: Negative for dizziness, light-headedness and headaches  Psychiatric/Behavioral: Positive for sleep disturbance  Negative for dysphoric mood  The patient is not nervous/anxious  According to step daughter she can get confused at times but when she is out she is perfect  /70   Pulse 80   Temp 98 9 °F (37 2 °C)   Resp 14   Ht 5' (1 524 m)   Wt 52 5 kg (115 lb 12 8 oz)   LMP  (LMP Unknown)   SpO2 98%   BMI 22 62 kg/m²   Social History     Socioeconomic History    Marital status:      Spouse name: Not on file    Number of children: Not on file    Years of education: Not on file    Highest education level: Not on file   Occupational History    Not on file   Social Needs    Financial resource strain: Not on file    Food insecurity:     Worry: Not on file     Inability: Not on file    Transportation needs:     Medical: Not on file     Non-medical: Not on file   Tobacco Use    Smoking status: Never Smoker    Smokeless tobacco: Never Used   Substance and Sexual Activity    Alcohol use:  Yes     Alcohol/week: 7 0 standard drinks     Types: 7 Glasses of wine per week     Comment: socially     Drug use: No    Sexual activity: Never   Lifestyle    Physical activity:     Days per week: Not on file     Minutes per session: Not on file    Stress: Not on file   Relationships    Social connections:     Talks on phone: Not on file     Gets together: Not on file     Attends Hoahaoism service: Not on file     Active member of club or organization: Not on file     Attends meetings of clubs or organizations: Not on file     Relationship status: Not on file    Intimate partner violence:     Fear of current or ex partner: Not on file     Emotionally abused: Not on file     Physically abused: Not on file     Forced sexual activity: Not on file   Other Topics Concern    Not on file   Social History Narrative    Not on file     Past Medical History:   Diagnosis Date    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 [address] )     4 1 cm on December 6, 2018    CKD (chronic kidney disease) stage 2, GFR 60-89 ml/min     Colon polyps     Dementia ([facility] [address] )     Hyperlipidemia     Hypertension     Insomnia     Memory loss     Osteopenia with elevated frax score     Osteoporosis     Tubular adenoma of colon 04/2019    Uterine fibroid      Family History   Problem Relation Age of Onset    Cancer Mother     Cancer Father      Past Surgical History:   Procedure Laterality Date    COLONOSCOPY  0505    date not certain     FOOT SURGERY      [place] COLONOSCOPY FLX DX W/COLLJ SPEC WHEN PFRMD N/A 4/24/2019    Procedure: COLONOSCOPY;  Surgeon: Curtis Fisher MD;  [facility]; Service: Gastroenterology    [place] ESOPHAGOGASTRODUODENOSCOPY TRANSORAL DIAGNOSTIC N/A 4/24/2019    Procedure: ESOPHAGOGASTRODUODENOSCOPY (EGD); Surgeon: Curtis Fisher MD;  [facility];   Service: Gastroenterology       Current Outpatient Medications:     aspirin 81 MG tablet, Take 1 tablet (81 mg total) by mouth daily, Disp: , Rfl:     lisinopril (ZESTRIL) 5 mg tablet, Take 1 tablet (5 mg total) by mouth daily, Disp: 90 tablet, Rfl: 1    memantine (NAMENDA) 10 mg tablet, Take 1 tablet (10 mg total) by mouth 2 (two) times a day, Disp: 180 tablet, Rfl: 1    pantoprazole (PROTONIX) 40 mg tablet, Take 1 tablet (40 mg total) by mouth daily, Disp: 90 tablet, Rfl: 1    Allergies   Allergen Reactions    Cefazolin      Other reaction(s): Unknown          Lab Review   No visits with results within 2 Month(s) from this visit  Latest known visit with results is:   Appointment on 08/05/2019   Component Date Value    Insulin-Like GF-1 08/05/2019 90     TSH 3RD GENERATON 08/05/2019 4 468*    Free T4 08/05/2019 0 90     ACTH 08/05/2019 39 1     Cortisol - AM 08/05/2019 18 8     Alpha Subunit (Free) 08/05/2019 0 77     Prolactin 08/05/2019 8 0     FSH 08/05/2019 102 3     LH 08/05/2019 38 0     Sodium 08/05/2019 138     Potassium 08/05/2019 4 3     Chloride 08/05/2019 101     CO2 08/05/2019 28     ANION GAP 08/05/2019 9     BUN 08/05/2019 22     Creatinine 08/05/2019 1 42*    Glucose, Fasting 08/05/2019 143*    Calcium 08/05/2019 9 5     eGFR 08/05/2019 35         Imaging: No results found  Objective:     Physical Exam   Constitutional: She is oriented to person, place, and time  She appears well-developed and well-nourished  No distress  HENT:   Head: Normocephalic and atraumatic  Right Ear: External ear normal    Left Ear: External ear normal    Mouth/Throat: Oropharynx is clear and moist    Eyes: Pupils are equal, round, and reactive to light  Conjunctivae and EOM are normal  Right eye exhibits no discharge  Left eye exhibits no discharge  Neck: Normal range of motion  Neck supple  Cardiovascular: Normal rate, regular rhythm and normal heart sounds  Pulmonary/Chest: Effort normal and breath sounds normal  No respiratory distress  Abdominal: Soft  Bowel sounds are normal  There is no tenderness  Musculoskeletal: Normal range of motion  She exhibits no edema, tenderness or deformity     No tenderness with palpation to right groin, right hip but mild tenderness to the medial aspect of the right anterior thigh  Lymphadenopathy:     She has no cervical adenopathy  Neurological: She is alert and oriented to person, place, and time  No cranial nerve deficit  Skin: Skin is warm and dry  No rash noted  She is not diaphoretic  Psychiatric: She has a normal mood and affect  Her behavior is normal  Judgment and thought content normal          There are no Patient Instructions on file for this visit  DASIA Davies    Portions of the record may have been created with voice recognition software  Occasional wrong word or "sound a like" substitutions may have occurred due to the inherent limitations of voice recognition software  Read the chart carefully and recognize, using context, where substitutions have occurred

## 2019-12-18 ENCOUNTER — OFFICE VISIT (OUTPATIENT)
Dept: FAMILY MEDICINE CLINIC | Facility: CLINIC | Age: 79
End: 2019-12-18
Payer: COMMERCIAL

## 2019-12-18 VITALS
TEMPERATURE: 99.3 F | SYSTOLIC BLOOD PRESSURE: 115 MMHG | HEIGHT: 63 IN | DIASTOLIC BLOOD PRESSURE: 70 MMHG | RESPIRATION RATE: 12 BRPM | HEART RATE: 50 BPM | OXYGEN SATURATION: 97 % | WEIGHT: 114 LBS | BODY MASS INDEX: 20.2 KG/M2

## 2019-12-18 DIAGNOSIS — F02.80 DEMENTIA ASSOCIATED WITH OTHER UNDERLYING DISEASE WITHOUT BEHAVIORAL DISTURBANCE (HCC): ICD-10-CM

## 2019-12-18 DIAGNOSIS — I10 BENIGN HYPERTENSION: ICD-10-CM

## 2019-12-18 DIAGNOSIS — Z00.00 ANNUAL PHYSICAL EXAM: Primary | ICD-10-CM

## 2019-12-18 DIAGNOSIS — Z23 NEED FOR TUBERCULOSIS VACCINATION: ICD-10-CM

## 2019-12-18 PROCEDURE — 3078F DIAST BP <80 MM HG: CPT | Performed by: NURSE PRACTITIONER

## 2019-12-18 PROCEDURE — 1160F RVW MEDS BY RX/DR IN RCRD: CPT | Performed by: NURSE PRACTITIONER

## 2019-12-18 PROCEDURE — 86580 TB INTRADERMAL TEST: CPT

## 2019-12-18 PROCEDURE — 99214 OFFICE O/P EST MOD 30 MIN: CPT | Performed by: NURSE PRACTITIONER

## 2019-12-18 PROCEDURE — 3074F SYST BP LT 130 MM HG: CPT | Performed by: NURSE PRACTITIONER

## 2019-12-18 NOTE — ASSESSMENT & PLAN NOTE
Patient and daughter are starting the process for adult  with [facility] in [facility] pass

## 2019-12-18 NOTE — PROGRESS NOTES
Assessment/Plan:     Chronic Problems:  Benign hypertension  Stable at this time, continue current medications  Dementia without behavioral disturbance  Patient and daughter are starting the process for adult  with [facility] in [facility] pass  Visit Diagnosis:  Diagnoses and all orders for this visit:    Annual physical exam  Comments:  Paperwork filled out, pending PPD testing  Will complete paperwork when testing is complete and contact family  Dementia associated with other underlying disease without behavioral disturbance ([facility] [address] )    Benign hypertension          Subjective:    Patient ID: Fran Suero is a 78 y o  female  [facility]  Following with Neuro for dementia and NS for pituitary tumor  Living at home alone, she does have a life alert button that she uses when needed  Her stepdaughter is with her today  She says that the memory has been about the same with some confusion  She would like her to be an adult  to keep an eye on her  The following portions of the patient's history were reviewed and updated as appropriate: allergies, current medications, past family history, past medical history, past social history, past surgical history and problem list     Review of Systems   Constitutional: Negative for chills, fatigue and fever  HENT: Negative for congestion and postnasal drip  Respiratory: Positive for cough (dry)  Negative for chest tightness, shortness of breath and wheezing  Cardiovascular: Negative for chest pain and palpitations  Gastrointestinal: Negative  Genitourinary: Negative  Musculoskeletal: Negative  Neurological: Negative for dizziness, light-headedness and headaches  Psychiatric/Behavioral: Positive for confusion (some memory issues)  Negative for dysphoric mood and sleep disturbance  The patient is not nervous/anxious            /70 (BP Location: Right arm, Patient Position: Sitting, Cuff Size: Adult)   Pulse (!) 50   Temp 99 3 °F (37 4 °C)   Resp 12   Ht 5' 2 5" (1 588 m) Comment: with shoes  Wt 51 7 kg (114 lb)   LMP  (LMP Unknown)   SpO2 97%   BMI 20 52 kg/m²   Social History     Socioeconomic History    Marital status:      Spouse name: Not on file    Number of children: Not on file    Years of education: Not on file    Highest education level: Not on file   Occupational History    Not on file   Social Needs    Financial resource strain: Not on file    Food insecurity:     Worry: Not on file     Inability: Not on file    Transportation needs:     Medical: Not on file     Non-medical: Not on file   Tobacco Use    Smoking status: Never Smoker    Smokeless tobacco: Never Used   Substance and Sexual Activity    Alcohol use:  Yes     Alcohol/week: 7 0 standard drinks     Types: 7 Glasses of wine per week     Comment: socially     Drug use: No    Sexual activity: Never   Lifestyle    Physical activity:     Days per week: Not on file     Minutes per session: Not on file    Stress: Not on file   Relationships    Social connections:     Talks on phone: Not on file     Gets together: Not on file     Attends Gnosticist service: Not on file     Active member of club or organization: Not on file     Attends meetings of clubs or organizations: Not on file     Relationship status: Not on file    Intimate partner violence:     Fear of current or ex partner: Not on file     Emotionally abused: Not on file     Physically abused: Not on file     Forced sexual activity: Not on file   Other Topics Concern    Not on file   Social History Narrative    Not on file     Past Medical History:   Diagnosis Date    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     Aortic aneurysm ([facility] )     4 1 cm on December 6, 2018    CKD (chronic kidney disease) stage 2, GFR 60-89 ml/min     Colon polyps     Dementia ([facility] [address] )     Hyperlipidemia     Hypertension     Insomnia     Memory loss     Osteopenia with elevated frax score     Osteoporosis     Tubular adenoma of colon 04/2019    Uterine fibroid      Family History   Problem Relation Age of Onset    Cancer Mother     Cancer Father      Past Surgical History:   Procedure Laterality Date    COLONOSCOPY  5942    date not certain     FOOT SURGERY      [place] COLONOSCOPY FLX DX W/COLLJ SPEC WHEN PFRMD N/A 4/24/2019    Procedure: COLONOSCOPY;  Surgeon: Federico Tafoya MD;  [facility]; Service: Gastroenterology    [place] ESOPHAGOGASTRODUODENOSCOPY TRANSORAL DIAGNOSTIC N/A 4/24/2019    Procedure: ESOPHAGOGASTRODUODENOSCOPY (EGD); Surgeon: Federico Tafoya MD;  [facility]; Service: Gastroenterology       Current Outpatient Medications:     aspirin 81 MG tablet, Take 1 tablet (81 mg total) by mouth daily, Disp: , Rfl:     lisinopril (ZESTRIL) 5 mg tablet, Take 1 tablet (5 mg total) by mouth daily, Disp: 90 tablet, Rfl: 1    memantine (NAMENDA) 10 mg tablet, Take 1 tablet (10 mg total) by mouth 2 (two) times a day, Disp: 180 tablet, Rfl: 1    pantoprazole (PROTONIX) 40 mg tablet, Take 1 tablet (40 mg total) by mouth daily, Disp: 90 tablet, Rfl: 1    Allergies   Allergen Reactions    Cefazolin      Other reaction(s): Unknown          Lab Review   No visits with results within 2 Month(s) from this visit     Latest known visit with results is:   Appointment on 08/05/2019   Component Date Value    Insulin-Like GF-1 08/05/2019 90     TSH 3RD GENERATON 08/05/2019 4 468*    Free T4 08/05/2019 0 90     ACTH 08/05/2019 39 1     Cortisol - AM 08/05/2019 18 8     Alpha Subunit (Free) 08/05/2019 0 77     Prolactin 08/05/2019 8 0     FSH 08/05/2019 102 3     LH 08/05/2019 38 0     Sodium 08/05/2019 138     Potassium 08/05/2019 4 3     Chloride 08/05/2019 101     CO2 08/05/2019 28     ANION GAP 08/05/2019 9     BUN 08/05/2019 22     Creatinine 08/05/2019 1 42*    Glucose, Fasting 08/05/2019 143*    Calcium 08/05/2019 9 5     eGFR 08/05/2019 35         Imaging: No results found  Objective:     Physical Exam   Constitutional: She is oriented to person, place, and time  She appears well-developed and well-nourished  No distress  HENT:   Head: Normocephalic  Right Ear: External ear normal    Left Ear: External ear normal    Nose: Nose normal    Mouth/Throat: Oropharynx is clear and moist  No oropharyngeal exudate  Eyes: Pupils are equal, round, and reactive to light  Conjunctivae and EOM are normal  Right eye exhibits no discharge  Left eye exhibits no discharge  Neck: Normal range of motion  Neck supple  Cardiovascular: Normal rate, regular rhythm and normal heart sounds  Pulmonary/Chest: Effort normal and breath sounds normal  No respiratory distress  She has no wheezes  Abdominal: Soft  Bowel sounds are normal    Musculoskeletal: Normal range of motion  She exhibits no edema or tenderness  Lymphadenopathy:     She has no cervical adenopathy  Neurological: She is alert and oriented to person, place, and time  Skin: Skin is warm and dry  She is not diaphoretic  Psychiatric: She has a normal mood and affect  Vitals reviewed  There are no Patient Instructions on file for this visit  DASIA Martiens    Portions of the record may have been created with voice recognition software  Occasional wrong word or "sound a like" substitutions may have occurred due to the inherent limitations of voice recognition software  Read the chart carefully and recognize, using context, where substitutions have occurred

## 2019-12-20 ENCOUNTER — CLINICAL SUPPORT (OUTPATIENT)
Dept: FAMILY MEDICINE CLINIC | Facility: CLINIC | Age: 79
End: 2019-12-20

## 2019-12-20 DIAGNOSIS — Z23 NEED FOR TUBERCULOSIS VACCINATION: Primary | ICD-10-CM

## 2019-12-20 DIAGNOSIS — Z11.1 ENCOUNTER FOR PPD SKIN TEST READING: Primary | ICD-10-CM

## 2019-12-20 LAB
INDURATION: 0 MM
TB SKIN TEST: NEGATIVE

## 2020-01-07 ENCOUNTER — APPOINTMENT (OUTPATIENT)
Dept: LAB | Facility: HOSPITAL | Age: 80
End: 2020-01-07
Payer: COMMERCIAL

## 2020-01-07 DIAGNOSIS — Z13.220 SCREENING, LIPID: ICD-10-CM

## 2020-01-07 DIAGNOSIS — R79.89 ELEVATED TSH: ICD-10-CM

## 2020-01-07 LAB
ALBUMIN SERPL BCP-MCNC: 4.1 G/DL (ref 3.5–5)
ALP SERPL-CCNC: 70 U/L (ref 46–116)
ALT SERPL W P-5'-P-CCNC: 19 U/L (ref 12–78)
ANION GAP SERPL CALCULATED.3IONS-SCNC: 10 MMOL/L (ref 4–13)
AST SERPL W P-5'-P-CCNC: 13 U/L (ref 5–45)
BACTERIA UR QL AUTO: ABNORMAL /HPF
BILIRUB SERPL-MCNC: 1.1 MG/DL (ref 0.2–1)
BILIRUB UR QL STRIP: NEGATIVE
BUN SERPL-MCNC: 23 MG/DL (ref 5–25)
CALCIUM SERPL-MCNC: 9.6 MG/DL (ref 8.3–10.1)
CHLORIDE SERPL-SCNC: 103 MMOL/L (ref 100–108)
CHOLEST SERPL-MCNC: 261 MG/DL (ref 50–200)
CLARITY UR: CLEAR
CO2 SERPL-SCNC: 28 MMOL/L (ref 21–32)
COLOR UR: YELLOW
CREAT SERPL-MCNC: 1.17 MG/DL (ref 0.6–1.3)
CREAT UR-MCNC: 23.8 MG/DL
GFR SERPL CREATININE-BSD FRML MDRD: 44 ML/MIN/1.73SQ M
GLUCOSE P FAST SERPL-MCNC: 88 MG/DL (ref 65–99)
GLUCOSE UR STRIP-MCNC: NEGATIVE MG/DL
HDLC SERPL-MCNC: 58 MG/DL
HGB UR QL STRIP.AUTO: NEGATIVE
KETONES UR STRIP-MCNC: NEGATIVE MG/DL
LDLC SERPL CALC-MCNC: 167 MG/DL (ref 0–100)
LEUKOCYTE ESTERASE UR QL STRIP: ABNORMAL
MICROALBUMIN UR-MCNC: 6.4 MG/L (ref 0–20)
MICROALBUMIN/CREAT 24H UR: 27 MG/G CREATININE (ref 0–30)
NITRITE UR QL STRIP: NEGATIVE
NON-SQ EPI CELLS URNS QL MICRO: ABNORMAL /HPF
NONHDLC SERPL-MCNC: 203 MG/DL
PH UR STRIP.AUTO: 6 [PH]
POTASSIUM SERPL-SCNC: 4.1 MMOL/L (ref 3.5–5.3)
PROT SERPL-MCNC: 7.7 G/DL (ref 6.4–8.2)
PROT UR STRIP-MCNC: NEGATIVE MG/DL
RBC #/AREA URNS AUTO: ABNORMAL /HPF
SODIUM SERPL-SCNC: 141 MMOL/L (ref 136–145)
SP GR UR STRIP.AUTO: <=1.005 (ref 1–1.03)
TRIGL SERPL-MCNC: 178 MG/DL
TSH SERPL DL<=0.05 MIU/L-ACNC: 3.62 UIU/ML (ref 0.36–3.74)
UROBILINOGEN UR QL STRIP.AUTO: 0.2 E.U./DL
WBC #/AREA URNS AUTO: ABNORMAL /HPF

## 2020-01-07 PROCEDURE — 84443 ASSAY THYROID STIM HORMONE: CPT

## 2020-01-07 PROCEDURE — 80061 LIPID PANEL: CPT

## 2020-01-07 PROCEDURE — 82043 UR ALBUMIN QUANTITATIVE: CPT | Performed by: FAMILY MEDICINE

## 2020-01-07 PROCEDURE — 80053 COMPREHEN METABOLIC PANEL: CPT

## 2020-01-07 PROCEDURE — 36415 COLL VENOUS BLD VENIPUNCTURE: CPT

## 2020-01-07 PROCEDURE — 81001 URINALYSIS AUTO W/SCOPE: CPT | Performed by: FAMILY MEDICINE

## 2020-01-07 PROCEDURE — 82570 ASSAY OF URINE CREATININE: CPT | Performed by: FAMILY MEDICINE

## 2020-01-09 ENCOUNTER — TELEPHONE (OUTPATIENT)
Dept: FAMILY MEDICINE CLINIC | Facility: CLINIC | Age: 80
End: 2020-01-09

## 2020-01-09 NOTE — TELEPHONE ENCOUNTER
Called and spoke with mariya and she was at work   I did give the results but she was at work so she will call back tomorrow to discuss further

## 2020-01-09 NOTE — TELEPHONE ENCOUNTER
She would like to know what's the name of the medication? For the cholestrol and what side effects will she have? She stated she gets off at 4:30PM so shell call for more information after she gets off work

## 2020-01-09 NOTE — TELEPHONE ENCOUNTER
----- Message from [email or web address], [address] sent at 1/9/2020 11:27 AM EST -----  Let them know her cholesterol is high, but it is not clear in the literature if we should be treating cholesterol over [de-identified]years old  Would she like a trial of med? It would help, but there is always the potential for side effects  Otherwise looks good  Let me know

## 2020-01-09 NOTE — TELEPHONE ENCOUNTER
She would like a call back for further explanation about the cholesterol medicine  She wanted to know if Robert Reid is going to be put on it for sure  Also what the side effects are

## 2020-01-10 NOTE — TELEPHONE ENCOUNTER
Katie Shoulder her next visit is in March with you do you want something sooner or can she wait till then

## 2020-01-27 DIAGNOSIS — I10 BENIGN HYPERTENSION: ICD-10-CM

## 2020-01-27 RX ORDER — LISINOPRIL 5 MG/1
TABLET ORAL
Qty: 90 TABLET | Refills: 1 | Status: SHIPPED | OUTPATIENT
Start: 2020-01-27 | End: 2020-04-24 | Stop reason: SDUPTHER

## 2020-02-28 ENCOUNTER — OFFICE VISIT (OUTPATIENT)
Dept: NEUROLOGY | Facility: CLINIC | Age: 80
End: 2020-02-28
Payer: COMMERCIAL

## 2020-02-28 ENCOUNTER — TELEPHONE (OUTPATIENT)
Dept: FAMILY MEDICINE CLINIC | Facility: CLINIC | Age: 80
End: 2020-02-28

## 2020-02-28 VITALS
SYSTOLIC BLOOD PRESSURE: 124 MMHG | BODY MASS INDEX: 20.98 KG/M2 | DIASTOLIC BLOOD PRESSURE: 64 MMHG | WEIGHT: 114 LBS | HEIGHT: 62 IN | RESPIRATION RATE: 18 BRPM | HEART RATE: 50 BPM

## 2020-02-28 DIAGNOSIS — E23.6 PITUITARY MASS (HCC): ICD-10-CM

## 2020-02-28 DIAGNOSIS — G30.1 LATE ONSET ALZHEIMER'S DISEASE WITHOUT BEHAVIORAL DISTURBANCE (HCC): Primary | ICD-10-CM

## 2020-02-28 DIAGNOSIS — F02.80 LATE ONSET ALZHEIMER'S DISEASE WITHOUT BEHAVIORAL DISTURBANCE (HCC): Primary | ICD-10-CM

## 2020-02-28 DIAGNOSIS — N18.30 CKD (CHRONIC KIDNEY DISEASE) STAGE 3, GFR 30-59 ML/MIN (HCC): ICD-10-CM

## 2020-02-28 PROCEDURE — 3078F DIAST BP <80 MM HG: CPT | Performed by: PSYCHIATRY & NEUROLOGY

## 2020-02-28 PROCEDURE — 1160F RVW MEDS BY RX/DR IN RCRD: CPT | Performed by: PSYCHIATRY & NEUROLOGY

## 2020-02-28 PROCEDURE — 99214 OFFICE O/P EST MOD 30 MIN: CPT | Performed by: PSYCHIATRY & NEUROLOGY

## 2020-02-28 PROCEDURE — 3074F SYST BP LT 130 MM HG: CPT | Performed by: PSYCHIATRY & NEUROLOGY

## 2020-02-28 PROCEDURE — 4040F PNEUMOC VAC/ADMIN/RCVD: CPT | Performed by: PSYCHIATRY & NEUROLOGY

## 2020-02-28 PROCEDURE — 1036F TOBACCO NON-USER: CPT | Performed by: PSYCHIATRY & NEUROLOGY

## 2020-02-28 PROCEDURE — 3008F BODY MASS INDEX DOCD: CPT | Performed by: PSYCHIATRY & NEUROLOGY

## 2020-02-28 RX ORDER — MEMANTINE HYDROCHLORIDE 10 MG/1
10 TABLET ORAL 2 TIMES DAILY
Qty: 180 TABLET | Refills: 1 | Status: SHIPPED | OUTPATIENT
Start: 2020-02-28 | End: 2020-10-09 | Stop reason: SDUPTHER

## 2020-02-28 NOTE — PROGRESS NOTES
Bo Marin is a 78 y o  female  Chief Complaint   Patient presents with    Dementia       Assessment:  No diagnosis found  Discussion:      Subjective:    HPI       Vitals:    02/28/20 1021   BP: 124/64   BP Location: Left arm   Patient Position: Sitting   Cuff Size: Adult   Pulse: (!) 50   Resp: 18   Weight: 51 7 kg (114 lb)   Height: 5' 2" (1 575 m)       Current Medications    Current Outpatient Medications:     aspirin 81 MG tablet, Take 1 tablet (81 mg total) by mouth daily, Disp: , Rfl:     lisinopril (ZESTRIL) 5 mg tablet, TAKE ONE TABLET BY MOUTH EVERY DAY, Disp: 90 tablet, Rfl: 1    memantine (NAMENDA) 10 mg tablet, Take 1 tablet (10 mg total) by mouth 2 (two) times a day, Disp: 180 tablet, Rfl: 1    pantoprazole (PROTONIX) 40 mg tablet, Take 1 tablet (40 mg total) by mouth daily, Disp: 90 tablet, Rfl: 1      Allergies  Cefazolin    Past Medical History  Past Medical History:   Diagnosis Date    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     Aortic aneurysm (HCC)     4 1 cm on December 6, 2018    CKD (chronic kidney disease) stage 2, GFR 60-89 ml/min     Colon polyps     Dementia (HCC)     Hyperlipidemia     Hypertension     Insomnia     Memory loss     Osteopenia with elevated frax score     Osteoporosis     Tubular adenoma of colon 04/2019    Uterine fibroid          Past Surgical History:  Past Surgical History:   Procedure Laterality Date    COLONOSCOPY  3516    date not certain     FOOT SURGERY      UT COLONOSCOPY FLX DX W/COLLJ SPEC WHEN PFRMD N/A 4/24/2019    Procedure: COLONOSCOPY;  Surgeon: Deepika Henao MD;  [facility]; Service: Gastroenterology    UT ESOPHAGOGASTRODUODENOSCOPY TRANSORAL DIAGNOSTIC N/A 4/24/2019    Procedure: ESOPHAGOGASTRODUODENOSCOPY (EGD); Surgeon: Deepika Henao MD;  [facility];   Service: Gastroenterology         Family History:  Family History   Problem Relation Age of Onset    Cancer Mother     Cancer Father        Social History:   reports that she has never smoked  She has never used smokeless tobacco  She reports that she drinks about 7 0 standard drinks of alcohol per week  She reports that she does not use drugs  Objective:    Physical Exam    Neurological Exam      ROS:  Review of Systems   Constitutional: Negative  HENT: Negative  Eyes: Negative  Respiratory: Negative  Cardiovascular: Negative  Gastrointestinal: Negative  Endocrine: Negative  Genitourinary: Negative  Musculoskeletal: Negative  Skin: Negative  Allergic/Immunologic: Negative  Neurological: Negative  Hematological: Negative  Psychiatric/Behavioral: Negative  Memory about the same   No changes

## 2020-02-28 NOTE — PROGRESS NOTES
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, she has not tolerated Aricept and Exelon in the past, she was advised to continue with mentally stimulating exercises, they are not keen to go for neuropsych testing, also she was advised to be under constant supervision, to take fall and safety precautions, she was told not to drive and she is currently not driving as she failed the driving adopt ability test, to keep her blood pressure cholesterol and sugar under control, to go to the hospital if has any worsening symptoms and call me otherwise to see me back in 6 months and follow up with other physicians,    Subjective:    HPI   Patient is here in follow-up for memory difficulty, she is accompanied with her son, since her last visit she is doing good, she is able to do her ADLs, she lives by herself but her family members and the neighbors keep a watch on her, I have told her multiple times to the family that she should be with somebody all the time and they are looking into that, her mood is good, sleep is good, she is tolerating Namenda, no hallucination, no nausea or vomiting, no headache vision or speech difficulty, appetite is good, weight has been stable, no other complaints      Vitals:    02/28/20 1021   BP: 124/64   BP Location: Left arm   Patient Position: Sitting   Cuff Size: Adult   Pulse: (!) 50   Resp: 18   Weight: 51 7 kg (114 lb)   Height: 5' 2" (1 575 m)       Current Medications    Current Outpatient Medications:     aspirin 81 MG tablet, Take 1 tablet (81 mg total) by mouth daily, Disp: , Rfl:     lisinopril (ZESTRIL) 5 mg tablet, TAKE ONE TABLET BY MOUTH EVERY DAY, Disp: 90 tablet, Rfl: 1    memantine (NAMENDA) 10 mg tablet, Take 1 tablet (10 mg total) by mouth 2 (two) times a day, Disp: 180 tablet, Rfl: 1    pantoprazole (PROTONIX) 40 mg tablet, Take 1 tablet (40 mg total) by mouth daily, Disp: 90 tablet, Rfl: 1      Allergies  Cefazolin    Past Medical History  Past Medical History:   Diagnosis Date    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     Aortic aneurysm (HCC)     4 1 cm on December 6, 2018    CKD (chronic kidney disease) stage 2, GFR 60-89 ml/min     Colon polyps     Dementia ([facility] [address] )     Hyperlipidemia     Hypertension     Insomnia     Memory loss     Osteopenia with elevated frax score     Osteoporosis     Tubular adenoma of colon 04/2019    Uterine fibroid          Past Surgical History:  Past Surgical History:   Procedure Laterality Date    COLONOSCOPY  5829    date not certain     FOOT SURGERY      AL COLONOSCOPY FLX DX W/COLLJ SPEC WHEN PFRMD N/A 4/24/2019    Procedure: COLONOSCOPY;  Surgeon: Brant Guevara MD;  [facility]; Service: Gastroenterology    AL ESOPHAGOGASTRODUODENOSCOPY TRANSORAL DIAGNOSTIC N/A 4/24/2019    Procedure: ESOPHAGOGASTRODUODENOSCOPY (EGD); Surgeon: Brant Guevara MD;  [facility]; Service: Gastroenterology         Family History:  Family History   Problem Relation Age of Onset    Cancer Mother     Cancer Father        Social History:   reports that she has never smoked  She has never used smokeless tobacco  She reports that she drinks about 7 0 standard drinks of alcohol per week  She reports that she does not use drugs  I have reviewed the past medical history, surgical history, social and family history, current medications, allergies vitals, review of systems, and updated this information as appropriate today     Objective:    Physical Exam    Neurological Exam    GENERAL:  Cooperative in no acute distress  Well-developed and well-nourished    HEAD and NECK   Head is atraumatic normocephalic with no lesions or masses  Neck is supple with full range of motion    CARDIOVASCULAR  Carotid Arteries-no carotid bruits  NEUROLOGIC:  Mental Status-the patient is awake alert and oriented without aphasia or apraxia, Pomerene is 20/30  Cranial Nerves: Visual fields are full to confrontation  Extraocular movements are full without nystagmus  Pupils are 2-1/2 mm and reactive  Face is symmetrical to light touch  Movements of facial expression move symmetrically  Hearing is normal to finger rub bilaterally  Soft palate lifts symmetrically  Shoulder shrug is symmetrical  Tongue is midline without atrophy  Motor: No drift is noted on arm extension  Strength is full in the upper and lower extremities with normal bulk and tone  Coordination: Finger to nose testing is performed accurately  Gait reveals a normal base with symmetrical arm swing  Reflexes:  2+ and symmetrical          ROS:  Review of Systems   Constitutional: Negative  HENT: Negative  Eyes: Negative  Respiratory: Negative  Cardiovascular: Negative  Gastrointestinal: Negative  Endocrine: Negative  Genitourinary: Negative  Musculoskeletal: Negative  Allergic/Immunologic: Negative  Neurological: Negative  Hematological: Negative      Psychiatric/Behavioral:        No change in Memory

## 2020-02-28 NOTE — TELEPHONE ENCOUNTER
----- Message from [address] [place] [address]  sent at 2/28/2020  2:41 PM EST -----  Please advise mammo is negative

## 2020-04-22 DIAGNOSIS — R11.2 NAUSEA & VOMITING: ICD-10-CM

## 2020-04-22 RX ORDER — PANTOPRAZOLE SODIUM 40 MG/1
TABLET, DELAYED RELEASE ORAL
Qty: 90 TABLET | Refills: 1 | Status: SHIPPED | OUTPATIENT
Start: 2020-04-22 | End: 2020-08-03

## 2020-04-24 DIAGNOSIS — I10 BENIGN HYPERTENSION: ICD-10-CM

## 2020-04-24 RX ORDER — LISINOPRIL 5 MG/1
5 TABLET ORAL DAILY
Qty: 90 TABLET | Refills: 1 | Status: SHIPPED | OUTPATIENT
Start: 2020-04-24 | End: 2020-08-04

## 2020-08-01 DIAGNOSIS — R11.2 NAUSEA & VOMITING: ICD-10-CM

## 2020-08-03 RX ORDER — PANTOPRAZOLE SODIUM 40 MG/1
TABLET, DELAYED RELEASE ORAL
Qty: 90 TABLET | Refills: 1 | Status: SHIPPED | OUTPATIENT
Start: 2020-08-03 | End: 2021-02-03

## 2020-08-04 DIAGNOSIS — I10 BENIGN HYPERTENSION: ICD-10-CM

## 2020-08-04 RX ORDER — LISINOPRIL 5 MG/1
TABLET ORAL
Qty: 90 TABLET | Refills: 0 | Status: SHIPPED | OUTPATIENT
Start: 2020-08-04 | End: 2020-09-17 | Stop reason: ALTCHOICE

## 2020-08-05 NOTE — TELEPHONE ENCOUNTER
Called patient to let her know Rx was put in and to schedule apt  Spoke to step daughter and scheduled her an apt and let her know Rx was ready

## 2020-08-13 ENCOUNTER — OFFICE VISIT (OUTPATIENT)
Dept: FAMILY MEDICINE CLINIC | Facility: CLINIC | Age: 80
End: 2020-08-13
Payer: COMMERCIAL

## 2020-08-13 VITALS
RESPIRATION RATE: 16 BRPM | DIASTOLIC BLOOD PRESSURE: 70 MMHG | TEMPERATURE: 98.2 F | HEIGHT: 62 IN | SYSTOLIC BLOOD PRESSURE: 120 MMHG | OXYGEN SATURATION: 97 % | WEIGHT: 112.9 LBS | BODY MASS INDEX: 20.78 KG/M2 | HEART RATE: 74 BPM

## 2020-08-13 DIAGNOSIS — F02.80 LATE ONSET ALZHEIMER'S DISEASE WITHOUT BEHAVIORAL DISTURBANCE (HCC): ICD-10-CM

## 2020-08-13 DIAGNOSIS — Z00.00 MEDICARE ANNUAL WELLNESS VISIT, SUBSEQUENT: Primary | ICD-10-CM

## 2020-08-13 DIAGNOSIS — Z23 ENCOUNTER FOR IMMUNIZATION: ICD-10-CM

## 2020-08-13 DIAGNOSIS — G30.1 LATE ONSET ALZHEIMER'S DISEASE WITHOUT BEHAVIORAL DISTURBANCE (HCC): ICD-10-CM

## 2020-08-13 DIAGNOSIS — I71.2 ASCENDING AORTIC ANEURYSM (HCC): ICD-10-CM

## 2020-08-13 DIAGNOSIS — I10 BENIGN HYPERTENSION: ICD-10-CM

## 2020-08-13 DIAGNOSIS — F34.1 DYSTHYMIA: ICD-10-CM

## 2020-08-13 DIAGNOSIS — N18.30 CKD (CHRONIC KIDNEY DISEASE) STAGE 3, GFR 30-59 ML/MIN (HCC): ICD-10-CM

## 2020-08-13 DIAGNOSIS — E23.6 PITUITARY MASS (HCC): ICD-10-CM

## 2020-08-13 DIAGNOSIS — R63.4 WEIGHT LOSS: ICD-10-CM

## 2020-08-13 PROCEDURE — 99214 OFFICE O/P EST MOD 30 MIN: CPT | Performed by: FAMILY MEDICINE

## 2020-08-13 PROCEDURE — 1125F AMNT PAIN NOTED PAIN PRSNT: CPT | Performed by: FAMILY MEDICINE

## 2020-08-13 PROCEDURE — 3008F BODY MASS INDEX DOCD: CPT | Performed by: FAMILY MEDICINE

## 2020-08-13 PROCEDURE — 1036F TOBACCO NON-USER: CPT | Performed by: FAMILY MEDICINE

## 2020-08-13 PROCEDURE — G0439 PPPS, SUBSEQ VISIT: HCPCS | Performed by: FAMILY MEDICINE

## 2020-08-13 PROCEDURE — 3074F SYST BP LT 130 MM HG: CPT | Performed by: FAMILY MEDICINE

## 2020-08-13 PROCEDURE — 1160F RVW MEDS BY RX/DR IN RCRD: CPT | Performed by: FAMILY MEDICINE

## 2020-08-13 PROCEDURE — 1170F FXNL STATUS ASSESSED: CPT | Performed by: FAMILY MEDICINE

## 2020-08-13 PROCEDURE — 3078F DIAST BP <80 MM HG: CPT | Performed by: FAMILY MEDICINE

## 2020-08-13 PROCEDURE — 4040F PNEUMOC VAC/ADMIN/RCVD: CPT | Performed by: FAMILY MEDICINE

## 2020-08-13 RX ORDER — ZOSTER VACCINE RECOMBINANT, ADJUVANTED 50 MCG/0.5
KIT INTRAMUSCULAR
Qty: 1 EACH | Refills: 1 | Status: SHIPPED | OUTPATIENT
Start: 2020-08-13 | End: 2021-03-09 | Stop reason: ALTCHOICE

## 2020-08-13 RX ORDER — MIRTAZAPINE 15 MG/1
15 TABLET, FILM COATED ORAL
Qty: 30 TABLET | Refills: 5 | Status: SHIPPED | OUTPATIENT
Start: 2020-08-13 | End: 2021-01-21

## 2020-08-13 NOTE — PROGRESS NOTES
Assessment and Plan:     Problem List Items Addressed This Visit     None      Visit Diagnoses     Medicare annual wellness visit, subsequent    -  Primary    Encounter for immunization        Relevant Medications    Zoster Vac Recomb Adjuvanted (Shingrix) 50 MCG/0 5ML SUSR           Preventive health issues were discussed with patient, and age appropriate screening tests were ordered as noted in patient's After Visit Summary  Personalized health advice and appropriate referrals for health education or preventive services given if needed, as noted in patient's After Visit Summary  History of Present Illness:     Patient presents for Medicare Annual Wellness visit    Patient Care Team:  [address] [place] [address]  as PCP - General (Family Medicine)  Yareli Esposito DO as PCP - [address] [place], [place] [id number] 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     Aortic aneurysm (HCC)     4 1 cm on December 6, 2018    CKD (chronic kidney disease) stage 2, GFR 60-89 ml/min     Colon polyps     Dementia ([facility] [address] )     Hyperlipidemia     Hypertension     Insomnia     Memory loss     Osteopenia with elevated frax score     Osteoporosis     Tubular adenoma of colon 04/2019    Uterine fibroid      Past Surgical History:   Procedure Laterality Date    COLONOSCOPY  2950    date not certain     FOOT SURGERY      IA COLONOSCOPY FLX DX W/COLLJ SPEC WHEN PFRMD N/A 4/24/2019    Procedure: COLONOSCOPY;  Surgeon: Estevan Mcdonough MD;  [facility];   Service: Gastroenterology    IA ESOPHAGOGASTRODUODENOSCOPY TRANSORAL DIAGNOSTIC N/A 4/24/2019    Procedure: ESOPHAGOGASTRODUODENOSCOPY (EGD); Surgeon: Deepak Avalos MD;  [facility]; Service: Gastroenterology      Family History:     Family History   Problem Relation Age of Onset    Cancer Mother     Cancer Father       Social History:        Social History     Socioeconomic History    Marital status:      Spouse name: None    Number of children: None    Years of education: None    Highest education level: None   Occupational History    None   Social Needs    Financial resource strain: None    Food insecurity     Worry: None     Inability: None    Transportation needs     Medical: None     Non-medical: None   Tobacco Use    Smoking status: Never Smoker    Smokeless tobacco: Never Used   Substance and Sexual Activity    Alcohol use:  Yes     Alcohol/week: 7 0 standard drinks     Types: 7 Glasses of wine per week     Frequency: Monthly or less     Comment: socially     Drug use: No    Sexual activity: Never   Lifestyle    Physical activity     Days per week: None     Minutes per session: None    Stress: None   Relationships    Social connections     Talks on phone: None     Gets together: None     Attends Jain service: None     Active member of club or organization: None     Attends meetings of clubs or organizations: None     Relationship status: None    Intimate partner violence     Fear of current or ex partner: None     Emotionally abused: None     Physically abused: None     Forced sexual activity: None   Other Topics Concern    None   Social History Narrative    None      Medications and Allergies:     Current Outpatient Medications   Medication Sig Dispense Refill    aspirin 81 MG tablet Take 1 tablet (81 mg total) by mouth daily      lisinopril (ZESTRIL) 5 mg tablet TAKE 1 TABLET DAILY 90 tablet 0    memantine (NAMENDA) 10 mg tablet Take 1 tablet (10 mg total) by mouth 2 (two) times a day 180 tablet 1    pantoprazole (PROTONIX) 40 mg tablet TAKE ONE TABLET BY MOUTH EVERY DAY 90 tablet 1    Zoster Vac Recomb Adjuvanted (Shingrix) 50 MCG/0 5ML SUSR 0 5mL IM for one dose, followed by 0 5mL IM 2-6 months after first dose 1 each 1     No current facility-administered medications for this visit  Allergies   Allergen Reactions    Cefazolin      Other reaction(s): Unknown      Immunizations:     Immunization History   Administered Date(s) Administered    Influenza, high dose seasonal 0 5 mL 09/13/2018    Pneumococcal Conjugate 13-Valent 09/12/2015    Pneumococcal Polysaccharide PPV23 11/08/2007    Tdap 11/16/2009    Tuberculin Skin Test-PPD Intradermal 12/18/2019    Zoster 07/30/2008    influenza, trivalent, adjuvanted 09/27/2019      Health Maintenance: There are no preventive care reminders to display for this patient  Topic Date Due    DTaP,Tdap,and Td Vaccines (2 - Td) 11/16/2019    Influenza Vaccine  07/01/2020      Medicare Health Risk Assessment:     /70   Pulse 74   Temp 98 2 °F (36 8 °C)   Resp 16   Ht 5' 2" (1 575 m)   Wt 51 2 kg (112 lb 14 4 oz)   LMP  (LMP Unknown)   SpO2 97%   BMI 20 65 kg/m²      Marcelo Soria is here for her Subsequent Wellness visit  Health Risk Assessment:   Patient rates overall health as very good  Patient feels that their physical health rating is same  Eyesight was rated as same  Hearing was rated as same  Patient feels that their emotional and mental health rating is same  Pain experienced in the last 7 days has been some  Patient's pain rating has been 5/10  Patient states that she has experienced no weight loss or gain in last 6 months  Depression Screening:   PHQ-2 Score: 1      Fall Risk Screening: In the past year, patient has experienced: no history of falling in past year      Urinary Incontinence Screening:   Patient has leaked urine accidently in the last six months       Home Safety:  Patient does not have trouble with stairs inside or outside of their home  Patient has working smoke alarms and has working carbon monoxide detector  Home safety hazards include: none  Nutrition:   Current diet is Regular  Medications:   Patient is not currently taking any over-the-counter supplements  Patient is able to manage medications  Activities of Daily Living (ADLs)/Instrumental Activities of Daily Living (IADLs):   Walk and transfer into and out of bed and chair?: Yes  Dress and groom yourself?: Yes    Bathe or shower yourself?: Yes    Feed yourself? Yes  Do your laundry/housekeeping?: Yes  Manage your money, pay your bills and track your expenses?: Yes  Make your own meals?: Yes    Do your own shopping?: Yes    Previous Hospitalizations:   Any hospitalizations or ED visits within the last 12 months?: No      Advance Care Planning:   Living will: Yes    Durable POA for healthcare:  Yes    Advanced directive: Yes    Advanced directive counseling given: Yes    Five wishes given: Yes    Patient declined ACP directive: No    End of Life Decisions reviewed with patient: Yes    Provider agrees with end of life decisions: Yes      Comments: Pt would not want resuscitation if no hope for recovery    Cognitive Screening:   Provider or family/friend/caregiver concerned regarding cognition?: Yes    Cognition Comments: Pt follows with neurology for dementia    PREVENTIVE SCREENINGS      Cardiovascular Screening:    General: Screening Current      Diabetes Screening:     General: Screening Current      Colorectal Cancer Screening:     General: Risks and Benefits Discussed and Screening Current      Breast Cancer Screening:     General: Screening Current      Cervical Cancer Screening:    General: Screening Not Indicated      Abdominal Aortic Aneurysm (AAA) Screening:        General: Risks and Benefits Discussed and Screening Not Indicated      Lung Cancer Screening:     General: Screening Not Indicated      Hepatitis C Screening:    General: Risks and Benefits Discussed and Screening Not Indicated      DASIA Davies

## 2020-08-13 NOTE — PROGRESS NOTES
Assessment/Plan:     Chronic Problems:  Benign hypertension  BP today is perfect  Continue current meds  Dementia without behavioral disturbance  Stay on the current meds  Make the f/u appt with her neurologist      Pituitary mass [facility])  Message sent to Dr Vishal Petit re: need for repeat mri of the brain  CKD (chronic kidney disease) stage 3, GFR 30-59 ml/min (HCC)  Will recheck labs and call with results  Ascending aortic aneurysm ([address] )  Will repeat echo now  Dysthymia  Will start mirtazapine 15 mg prior to bed and f/u here in 3 weeks  Visit Diagnosis:  Diagnoses and all orders for this visit:    Medicare annual wellness visit, subsequent    Encounter for immunization  -     Zoster Vac Recomb Adjuvanted (Shingrix) 50 MCG/0 5ML SUSR; 0 5mL IM for one dose, followed by 0 5mL IM 2-6 months after first dose    Benign hypertension    Late onset Alzheimer's disease without behavioral disturbance (HCC)    Pituitary mass (HCC)    CKD (chronic kidney disease) stage 3, GFR 30-59 ml/min (HCC)  -     Comprehensive metabolic panel; Future  -     Microalbumin / creatinine urine ratio  -     Urinalysis with microscopic    Ascending aortic aneurysm (HCC)  -     Echo complete with contrast if indicated; Future    Weight loss   Comments: Will start mirtazapine 15 mg hs  This will make her drowsy, but will also help with her depression  Orders:  -     Echo complete with contrast if indicated; Future  -     TSH, 3rd generation with Free T4 reflex; Future  -     mirtazapine (REMERON) 15 mg tablet; Take 1 tablet (15 mg total) by mouth daily at bedtime    Dysthymia  -     TSH, 3rd generation with Free T4 reflex; Future  -     mirtazapine (REMERON) 15 mg tablet; Take 1 tablet (15 mg total) by mouth daily at bedtime          Subjective:    Patient ID: Hussain You is a 78 y o  female  Pt is here ac by step daughter  Feels she is doing well  No complaints today  Enjoying  Her home and her pond   Pt is overdue with her neuro f/u r/t covid  Does not need renewals on meds  Seen by neurosurg and was scheduled to have a f/u on her pituitary in 6 months which was not done  Pt was told it is their choice to f/u with Dr Tenisha Jones or not  All meds reviewed with pt and current  Takes all other meds as directed  No side effects noted  Family feels she is depressed at times as she is home by herselft  They also are requesting a med to help her with her apptetite  Lost 3 lbs since the last visit  The following portions of the patient's history were reviewed and updated as appropriate: allergies, current medications, past family history, past medical history, past social history, past surgical history and problem list     Review of Systems   Constitutional: Negative for chills, diaphoresis, fatigue and fever  HENT: Negative  Eyes: Negative  Respiratory: Positive for cough (from the dry air in the house  Cough is non productive, but feels gurgling  )  Negative for shortness of breath and wheezing  Cardiovascular: Negative for chest pain and palpitations  Gastrointestinal: Negative for abdominal pain, constipation, diarrhea, nausea and vomiting  Has gas in the am's and takes gas ex with relief  Genitourinary: Negative for dysuria, frequency and urgency  Sometimes has urinary incontinence  Musculoskeletal: Negative for arthralgias and myalgias  Every now and then has a pulled groin on the right  Neurological: Negative for dizziness, light-headedness and headaches  Psychiatric/Behavioral: Positive for dysphoric mood (mild per pt  )  The patient is not nervous/anxious  Forgetful when it comes to the date  Has to make f/u appt with her neurologist           /70   Pulse 74   Temp 98 2 °F (36 8 °C)   Resp 16   Ht 5' 2" (1 575 m)   Wt 51 2 kg (112 lb 14 4 oz)   LMP  (LMP Unknown)   SpO2 97%   BMI 20 65 kg/m²   Social History     Socioeconomic History    Marital status:   Spouse name: Not on file    Number of children: Not on file    Years of education: Not on file    Highest education level: Not on file   Occupational History    Not on file   Social Needs    Financial resource strain: Not on file    Food insecurity     Worry: Not on file     Inability: Not on file    Transportation needs     Medical: Not on file     Non-medical: Not on file   Tobacco Use    Smoking status: Never Smoker    Smokeless tobacco: Never Used   Substance and Sexual Activity    Alcohol use:  Yes     Alcohol/week: 7 0 standard drinks     Types: 7 Glasses of wine per week     Frequency: Monthly or less     Comment: socially     Drug use: No    Sexual activity: Never   Lifestyle    Physical activity     Days per week: Not on file     Minutes per session: Not on file    Stress: Not on file   Relationships    Social connections     Talks on phone: Not on file     Gets together: Not on file     Attends Gnosticism service: Not on file     Active member of club or organization: Not on file     Attends meetings of clubs or organizations: Not on file     Relationship status: Not on file    Intimate partner violence     Fear of current or ex partner: Not on file     Emotionally abused: Not on file     Physically abused: Not on file     Forced sexual activity: Not on file   Other Topics Concern    Not on file   Social History Narrative    Not on file     Past Medical History:   Diagnosis Date    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     Aortic aneurysm ([facility] [address] )     4 1 cm on December 6, 2018    CKD (chronic kidney disease) stage 2, GFR 60-89 ml/min     Colon polyps     Dementia ([facility] [address] )     Hyperlipidemia     Hypertension     Insomnia     Memory loss     Osteopenia with elevated frax score     Osteoporosis     Tubular adenoma of colon 04/2019    Uterine fibroid      Family History   Problem Relation Age of Onset    Cancer Mother     Cancer Father      Past Surgical History: Procedure Laterality Date    COLONOSCOPY  8321    date not certain     FOOT SURGERY      [place] COLONOSCOPY FLX DX W/COLLJ SPEC WHEN PFRMD N/A 4/24/2019    Procedure: COLONOSCOPY;  Surgeon: Silvana Ambrocio MD;  Location: [facility]; Service: Gastroenterology    [place] ESOPHAGOGASTRODUODENOSCOPY TRANSORAL DIAGNOSTIC N/A 4/24/2019    Procedure: ESOPHAGOGASTRODUODENOSCOPY (EGD); Surgeon: Silvana Ambrocio MD;  Location: [facility]; Service: Gastroenterology       Current Outpatient Medications:     aspirin 81 MG tablet, Take 1 tablet (81 mg total) by mouth daily, Disp: , Rfl:     lisinopril (ZESTRIL) 5 mg tablet, TAKE 1 TABLET DAILY, Disp: 90 tablet, Rfl: 0    memantine (NAMENDA) 10 mg tablet, Take 1 tablet (10 mg total) by mouth 2 (two) times a day, Disp: 180 tablet, Rfl: 1    pantoprazole (PROTONIX) 40 mg tablet, TAKE ONE TABLET BY MOUTH EVERY DAY, Disp: 90 tablet, Rfl: 1    mirtazapine (REMERON) 15 mg tablet, Take 1 tablet (15 mg total) by mouth daily at bedtime, Disp: 30 tablet, Rfl: 5    Zoster Vac Recomb Adjuvanted (Shingrix) 50 MCG/0 5ML SUSR, 0 5mL IM for one dose, followed by 0 5mL IM 2-6 months after first dose, Disp: 1 each, Rfl: 1    Allergies   Allergen Reactions    Cefazolin      Other reaction(s): Unknown          Lab Review   No visits with results within 2 Month(s) from this visit     Latest known visit with results is:   Appointment on 01/07/2020   Component Date Value    Sodium 01/07/2020 141     Potassium 01/07/2020 4 1     Chloride 01/07/2020 103     CO2 01/07/2020 28     ANION GAP 01/07/2020 10     BUN 01/07/2020 23     Creatinine 01/07/2020 1 17     Glucose, Fasting 01/07/2020 88     Calcium 01/07/2020 9 6     AST 01/07/2020 13     ALT 01/07/2020 19     Alkaline Phosphatase 01/07/2020 70     Total Protein 01/07/2020 7 7     Albumin 01/07/2020 4 1     Total Bilirubin 01/07/2020 1 10*    eGFR 01/07/2020 44     Cholesterol 01/07/2020 261*    Triglycerides 01/07/2020 178*  HDL, Direct 01/07/2020 58     LDL Calculated 01/07/2020 167*    Non-HDL-Chol (CHOL-HDL) 01/07/2020 203     TSH 3RD [facility] 01/07/2020 3 618         Imaging: No results found  Objective:     Physical Exam  Constitutional:       General: She is not in acute distress  Appearance: Normal appearance  She is not ill-appearing, toxic-appearing or diaphoretic  HENT:      Head: Normocephalic and atraumatic  Right Ear: Tympanic membrane, ear canal and external ear normal       Left Ear: Tympanic membrane, ear canal and external ear normal       Nose: Nose normal  No congestion or rhinorrhea  Mouth/Throat:      Mouth: Mucous membranes are moist       Pharynx: No oropharyngeal exudate or posterior oropharyngeal erythema  Eyes:      General:         Right eye: No discharge  Left eye: No discharge  Extraocular Movements: Extraocular movements intact  Conjunctiva/sclera: Conjunctivae normal       Pupils: Pupils are equal, round, and reactive to light  Neck:      Musculoskeletal: Normal range of motion and neck supple  No neck rigidity or muscular tenderness  Cardiovascular:      Rate and Rhythm: Normal rate and regular rhythm  Heart sounds: Murmur (Grade 1/6 systolic murmur) present  Pulmonary:      Effort: Pulmonary effort is normal  No respiratory distress  Breath sounds: Normal breath sounds  No wheezing or rales  Abdominal:      General: Abdomen is flat  Bowel sounds are normal       Palpations: Abdomen is soft  Musculoskeletal: Normal range of motion  General: No swelling or tenderness  Right lower leg: No edema  Left lower leg: No edema  Skin:     General: Skin is warm and dry  Capillary Refill: Capillary refill takes less than 2 seconds  Neurological:      General: No focal deficit present  Mental Status: She is alert and oriented to person, place, and time     Psychiatric:         Mood and Affect: Mood normal  Behavior: Behavior normal          Thought Content: Thought content normal          Judgment: Judgment normal            Patient Instructions      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  For now start the mirtazapine about a half an hour prior to bed  This will help with anxiety it will help with depression and it will also give her an appetite but she will be drowsy and she will sleep  Continue all current medications  Follow-up here 3-4 weeks  Call if you have a problem on this medicine  You will be drowsy the 1st few days your on this so be careful  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  Medicare Preventive Visit Patient Instructions  Thank you for completing your Welcome to Medicare Visit or Medicare Annual Wellness Visit today  Your next wellness visit will be due in one year (8/13/2021)  The screening/preventive services that you may require over the next 5-10 years are detailed below  Some tests may not apply to you based off risk factors and/or age  Screening tests ordered at today's visit but not completed yet may show as past due  Also, please note that scanned in results may not display below    Preventive Screenings:  Service Recommendations Previous Testing/Comments   Colorectal Cancer Screening  * Colonoscopy    * Fecal Occult Blood Test (FOBT)/Fecal Immunochemical Test (FIT)  * Fecal DNA/Cologuard Test  * Flexible Sigmoidoscopy Age: 54-65 years old   Colonoscopy: every 10 years (may be performed more frequently if at higher risk)  OR  FOBT/FIT: every 1 year OR  Cologuard: every 3 years  OR  Sigmoidoscopy: every 5 years  Screening may be recommended earlier than age 48 if at higher risk for colorectal cancer  Also, an individualized decision between you and your healthcare provider will decide whether screening between the ages of 74-80 would be appropriate  Colonoscopy: Not on file  FOBT/FIT: Not on file  Cologuard: Not on file  Sigmoidoscopy: Not on file         Breast Cancer Screening Age: 36 years old  Frequency: every 1-2 years  Not required if history of left and right mastectomy Mammogram: 02/25/2020    Screening Current   Cervical Cancer Screening Between the ages of 21-29, pap smear recommended once every 3 years  Between the ages of 33-67, can perform pap smear with HPV co-testing every 5 years  Recommendations may differ for women with a history of total hysterectomy, cervical cancer, or abnormal pap smears in past  Pap Smear: Not on file    Screening Not Indicated   Hepatitis C Screening Once for adults born between 1945 and 1965  More frequently in patients at high risk for Hepatitis C Hep C Antibody: Not on file       Diabetes Screening 1-2 times per year if you're at risk for diabetes or have pre-diabetes Fasting glucose: 88 mg/dL   A1C: No results in last 5 years    Screening Current   Cholesterol Screening Once every 5 years if you don't have a lipid disorder  May order more often based on risk factors  Lipid panel: 01/07/2020    Screening Current     Other Preventive Screenings Covered by Medicare:  1  Abdominal Aortic Aneurysm (AAA) Screening: covered once if your at risk  You're considered to be at risk if you have a family history of AAA    2  Lung Cancer Screening: covers low dose CT scan once per year if you meet all of the following conditions: (1) Age 50-69; (2) No signs or symptoms of lung cancer; (3) Current smoker or have quit smoking within the last 15 years; (4) You have a tobacco smoking history of at least 30 pack years (packs per day multiplied by number of years you smoked); (5) You get a written order from a healthcare provider  3  Glaucoma Screening: covered annually if you're considered high risk: (1) You have diabetes OR (2) Family history of glaucoma OR (3)  aged 48 and older OR (3)  American aged 72 and older  3  Osteoporosis Screening: covered every 2 years if you meet one of the following conditions: (1) You're estrogen deficient and at risk for osteoporosis based off medical history and other findings; (2) Have a vertebral abnormality; (3) On glucocorticoid therapy for more than 3 months; (4) Have primary hyperparathyroidism; (5) On osteoporosis medications and need to assess response to drug therapy  · Last bone density test (DXA Scan): 03/26/2019   5  HIV Screening: covered annually if you're between the age of 15-65  Also covered annually if you are younger than 13 and older than 72 with risk factors for HIV infection  For pregnant patients, it is covered up to 3 times per pregnancy  Immunizations:  Immunization Recommendations   Influenza Vaccine Annual influenza vaccination during flu season is recommended for all persons aged >= 6 months who do not have contraindications   Pneumococcal Vaccine (Prevnar and Pneumovax)  * Prevnar = PCV13  * Pneumovax = PPSV23   Adults 25-60 years old: 1-3 doses may be recommended based on certain risk factors  Adults 72 years old: Prevnar (PCV13) vaccine recommended followed by Pneumovax (PPSV23) vaccine  If already received PPSV23 since turning 65, then PCV13 recommended at least one year after PPSV23 dose  Hepatitis B Vaccine 3 dose series if at intermediate or high risk (ex: diabetes, end stage renal disease, liver disease)   Tetanus (Td) Vaccine - COST NOT COVERED BY MEDICARE PART B Following completion of primary series, a booster dose should be given every 10 years to maintain immunity against tetanus  Td may also be given as tetanus wound prophylaxis     Tdap Vaccine - COST NOT COVERED BY MEDICARE PART B Recommended at least once for all adults  For pregnant patients, recommended with each pregnancy  Shingles Vaccine (Shingrix) - COST NOT COVERED BY MEDICARE PART B  2 shot series recommended in those aged 48 and above     Health Maintenance Due:  There are no preventive care reminders to display for this patient  Immunizations Due:      Topic Date Due    DTaP,Tdap,and Td Vaccines (2 - Td) 11/16/2019    Influenza Vaccine  07/01/2020     Advance Directives   What are advance directives? Advance directives are legal documents that state your wishes and plans for medical care  These plans are made ahead of time in case you lose your ability to make decisions for yourself  Advance directives can apply to any medical decision, such as the treatments you want, and if you want to donate organs  What are the types of advance directives? There are many types of advance directives, and each state has rules about how to use them  You may choose a combination of any of the following:  · Living will: This is a written record of the treatment you want  You can also choose which treatments you do not want, which to limit, and which to stop at a certain time  This includes surgery, medicine, IV fluid, and tube feedings  · Durable power of  for healthcare [facility]): This is a written record that states who you want to make healthcare choices for you when you are unable to make them for yourself  This person, called a proxy, is usually a family member or a friend  You may choose more than 1 proxy  · Do not resuscitate (DNR) order:  A DNR order is used in case your heart stops beating or you stop breathing  It is a request not to have certain forms of treatment, such as CPR  A DNR order may be included in other types of advance directives  · Medical directive: This covers the care that you want if you are in a coma, near death, or unable to make decisions for yourself  You can list the treatments you want for each condition  Treatment may include pain medicine, surgery, blood transfusions, dialysis, IV or tube feedings, and a ventilator (breathing machine)  · Values history: This document has questions about your views, beliefs, and how you feel and think about life  This information can help others choose the care that you would choose  Why are advance directives important? An advance directive helps you control your care  Although spoken wishes may be used, it is better to have your wishes written down  Spoken wishes can be misunderstood, or not followed  Treatments may be given even if you do not want them  An advance directive may make it easier for your family to make difficult choices about your care  Urinary Incontinence   Urinary incontinence (UI)  is when you lose control of your bladder  UI develops because your bladder cannot store or empty urine properly  The 3 most common types of UI are stress incontinence, urge incontinence, or both  Medicines:   · May be given to help strengthen your bladder control  Report any side effects of medication to your healthcare provider  Do pelvic muscle exercises often:  Your pelvic muscles help you stop urinating  Squeeze these muscles tight for 5 seconds, then relax for 5 seconds  Gradually work up to squeezing for 10 seconds  Do 3 sets of 15 repetitions a day, or as directed  This will help strengthen your pelvic muscles and improve bladder control  Train your bladder:  Go to the bathroom at set times, such as every 2 hours, even if you do not feel the urge to go  You can also try to hold your urine when you feel the urge to go  For example, hold your urine for 5 minutes when you feel the urge to go  As that becomes easier, hold your urine for 10 minutes  Self-care:   · Keep a UI record  Write down how often you leak urine and how much you leak  Make a note of what you were doing when you leaked urine    · Drink liquids as directed  You may need to limit the amount of liquid you drink to help control your urine leakage  Do not drink any liquid right before you go to bed  Limit or do not have drinks that contain caffeine or alcohol  · Prevent constipation  Eat a variety of high-fiber foods  Good examples are high-fiber cereals, beans, vegetables, and whole-grain breads  Walking is the best way to trigger your intestines to have a bowel movement  · Exercise regularly and maintain a healthy weight  Weight loss and exercise will decrease pressure on your bladder and help you control your leakage  · Use a catheter as directed  to help empty your bladder  A catheter is a tiny, plastic tube that is put into your bladder to drain your urine  · Go to behavior therapy as directed  Behavior therapy may be used to help you learn to control your urge to urinate  © Copyright [employer] 2018 Information is for End User's use only and may not be sold, redistributed or otherwise used for commercial purposes  All illustrations and images included in CareNotes® are the copyrighted property of [employer]  or [address] [place] [address]    Portions of the record may have been created with voice recognition software  Occasional wrong word or "sound a like" substitutions may have occurred due to the inherent limitations of voice recognition software  Read the chart carefully and recognize, using context, where substitutions have occurred

## 2020-08-13 NOTE — PROGRESS NOTES
There is a note in chart review from that doctor stating that she was to f/u up with him in 6 months with an repeat MRI which should have been done back in Feb 2020 but that was right when Kushal started

## 2020-08-13 NOTE — PATIENT INSTRUCTIONS
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  For now start the mirtazapine about a half an hour prior to bed  This will help with anxiety it will help with depression and it will also give her an appetite but she will be drowsy and she will sleep  Continue all current medications  Follow-up here 3-4 weeks  Call if you have a problem on this medicine  You will be drowsy the 1st few days your on this so be careful  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  Medicare Preventive Visit Patient Instructions  Thank you for completing your Welcome to Medicare Visit or Medicare Annual Wellness Visit today  Your next wellness visit will be due in one year (8/13/2021)  The screening/preventive services that you may require over the next 5-10 years are detailed below  Some tests may not apply to you based off risk factors and/or age  Screening tests ordered at today's visit but not completed yet may show as past due  Also, please note that scanned in results may not display below    Preventive Screenings:  Service Recommendations Previous Testing/Comments   Colorectal Cancer Screening  * Colonoscopy    * Fecal Occult Blood Test (FOBT)/Fecal Immunochemical Test (FIT)  * Fecal DNA/Cologuard Test  * Flexible Sigmoidoscopy Age: 54-65 years old   Colonoscopy: every 10 years (may be performed more frequently if at higher risk)  OR  FOBT/FIT: every 1 year  OR  Cologuard: every 3 years  OR  Sigmoidoscopy: every 5 years  Screening may be recommended earlier than age 48 if at higher risk for colorectal cancer  Also, an individualized decision between you and your healthcare provider will decide whether screening between the ages of 74-80 would be appropriate  Colonoscopy: Not on file  FOBT/FIT: Not on file  Cologuard: Not on file  Sigmoidoscopy: Not on file         Breast Cancer Screening Age: 36 years old  Frequency: every 1-2 years  Not required if history of left and right mastectomy Mammogram: 02/25/2020    Screening Current   Cervical Cancer Screening Between the ages of 21-29, pap smear recommended once every 3 years  Between the ages of 33-67, can perform pap smear with HPV co-testing every 5 years  Recommendations may differ for women with a history of total hysterectomy, cervical cancer, or abnormal pap smears in past  Pap Smear: Not on file    Screening Not Indicated   Hepatitis C Screening Once for adults born between 1945 and 1965  More frequently in patients at high risk for Hepatitis C Hep C Antibody: Not on file       Diabetes Screening 1-2 times per year if you're at risk for diabetes or have pre-diabetes Fasting glucose: 88 mg/dL   A1C: No results in last 5 years    Screening Current   Cholesterol Screening Once every 5 years if you don't have a lipid disorder  May order more often based on risk factors  Lipid panel: 01/07/2020    Screening Current     Other Preventive Screenings Covered by Medicare:  1  Abdominal Aortic Aneurysm (AAA) Screening: covered once if your at risk  You're considered to be at risk if you have a family history of AAA  2  Lung Cancer Screening: covers low dose CT scan once per year if you meet all of the following conditions: (1) Age 50-69; (2) No signs or symptoms of lung cancer; (3) Current smoker or have quit smoking within the last 15 years; (4) You have a tobacco smoking history of at least 30 pack years (packs per day multiplied by number of years you smoked); (5) You get a written order from a healthcare provider    3  Glaucoma Screening: covered annually if you're considered high risk: (1) You have diabetes OR (2) Family history of glaucoma OR (3)  aged 48 and older OR (4)  American aged 72 and older  3  Osteoporosis Screening: covered every 2 years if you meet one of the following conditions: (1) You're estrogen deficient and at risk for osteoporosis based off medical history and other findings; (2) Have a vertebral abnormality; (3) On glucocorticoid therapy for more than 3 months; (4) Have primary hyperparathyroidism; (5) On osteoporosis medications and need to assess response to drug therapy  · Last bone density test (DXA Scan): 03/26/2019   5  HIV Screening: covered annually if you're between the age of 15-65  Also covered annually if you are younger than 13 and older than 72 with risk factors for HIV infection  For pregnant patients, it is covered up to 3 times per pregnancy  Immunizations:  Immunization Recommendations   Influenza Vaccine Annual influenza vaccination during flu season is recommended for all persons aged >= 6 months who do not have contraindications   Pneumococcal Vaccine (Prevnar and Pneumovax)  * Prevnar = PCV13  * Pneumovax = PPSV23   Adults 25-60 years old: 1-3 doses may be recommended based on certain risk factors  Adults 72 years old: Prevnar (PCV13) vaccine recommended followed by Pneumovax (PPSV23) vaccine  If already received PPSV23 since turning 65, then PCV13 recommended at least one year after PPSV23 dose  Hepatitis B Vaccine 3 dose series if at intermediate or high risk (ex: diabetes, end stage renal disease, liver disease)   Tetanus (Td) Vaccine - COST NOT COVERED BY MEDICARE PART B Following completion of primary series, a booster dose should be given every 10 years to maintain immunity against tetanus  Td may also be given as tetanus wound prophylaxis  Tdap Vaccine - COST NOT COVERED BY MEDICARE PART B Recommended at least once for all adults  For pregnant patients, recommended with each pregnancy  Shingles Vaccine (Shingrix) - COST NOT COVERED BY MEDICARE PART B  2 shot series recommended in those aged 48 and above     Health Maintenance Due:  There are no preventive care reminders to display for this patient  Immunizations Due:      Topic Date Due    DTaP,Tdap,and Td Vaccines (2 - Td) 11/16/2019    Influenza Vaccine  07/01/2020     Advance Directives   What are advance directives? Advance directives are legal documents that state your wishes and plans for medical care  These plans are made ahead of time in case you lose your ability to make decisions for yourself  Advance directives can apply to any medical decision, such as the treatments you want, and if you want to donate organs  What are the types of advance directives? There are many types of advance directives, and each state has rules about how to use them  You may choose a combination of any of the following:  · Living will: This is a written record of the treatment you want  You can also choose which treatments you do not want, which to limit, and which to stop at a certain time  This includes surgery, medicine, IV fluid, and tube feedings  · Durable power of  for healthcare [place] [facility]): This is a written record that states who you want to make healthcare choices for you when you are unable to make them for yourself  This person, called a proxy, is usually a family member or a friend  You may choose more than 1 proxy  · Do not resuscitate (DNR) order:  A DNR order is used in case your heart stops beating or you stop breathing  It is a request not to have certain forms of treatment, such as CPR  A DNR order may be included in other types of advance directives  · Medical directive: This covers the care that you want if you are in a coma, near death, or unable to make decisions for yourself  You can list the treatments you want for each condition   Treatment may include pain medicine, surgery, blood transfusions, dialysis, IV or tube feedings, and a ventilator (breathing machine)  · Values history: This document has questions about your views, beliefs, and how you feel and think about life  This information can help others choose the care that you would choose  Why are advance directives important? An advance directive helps you control your care  Although spoken wishes may be used, it is better to have your wishes written down  Spoken wishes can be misunderstood, or not followed  Treatments may be given even if you do not want them  An advance directive may make it easier for your family to make difficult choices about your care  Urinary Incontinence   Urinary incontinence (UI)  is when you lose control of your bladder  UI develops because your bladder cannot store or empty urine properly  The 3 most common types of UI are stress incontinence, urge incontinence, or both  Medicines:   · May be given to help strengthen your bladder control  Report any side effects of medication to your healthcare provider  Do pelvic muscle exercises often:  Your pelvic muscles help you stop urinating  Squeeze these muscles tight for 5 seconds, then relax for 5 seconds  Gradually work up to squeezing for 10 seconds  Do 3 sets of 15 repetitions a day, or as directed  This will help strengthen your pelvic muscles and improve bladder control  Train your bladder:  Go to the bathroom at set times, such as every 2 hours, even if you do not feel the urge to go  You can also try to hold your urine when you feel the urge to go  For example, hold your urine for 5 minutes when you feel the urge to go  As that becomes easier, hold your urine for 10 minutes  Self-care:   · Keep a UI record  Write down how often you leak urine and how much you leak  Make a note of what you were doing when you leaked urine  · Drink liquids as directed  You may need to limit the amount of liquid you drink to help control your urine leakage   Do not drink any liquid right before you go to bed  Limit or do not have drinks that contain caffeine or alcohol  · Prevent constipation  Eat a variety of high-fiber foods  Good examples are high-fiber cereals, beans, vegetables, and whole-grain breads  Walking is the best way to trigger your intestines to have a bowel movement  · Exercise regularly and maintain a healthy weight  Weight loss and exercise will decrease pressure on your bladder and help you control your leakage  · Use a catheter as directed  to help empty your bladder  A catheter is a tiny, plastic tube that is put into your bladder to drain your urine  · Go to behavior therapy as directed  Behavior therapy may be used to help you learn to control your urge to urinate  © Copyright [employer] 2018 Information is for End User's use only and may not be sold, redistributed or otherwise used for commercial purposes   All illustrations and images included in CareNotes® are the copyrighted property of A D A M , Inc  or [address] [place] [address] [employer]

## 2020-08-13 NOTE — Clinical Note
Can you put out a call to Dr Kennedy [facility] office and find out if he still wants this pt to have another mri of her pituitary? The note says yes, but there is no request in the emr

## 2020-08-14 ENCOUNTER — TELEPHONE (OUTPATIENT)
Dept: FAMILY MEDICINE CLINIC | Facility: CLINIC | Age: 80
End: 2020-08-14

## 2020-08-14 NOTE — TELEPHONE ENCOUNTER
----- Message from [address] [place] [address]  sent at 8/13/2020  6:29 PM EDT -----  From the telephone encounter, he just wanted her to have a repeat mri and her step daughter also said they would not have to see him again  Best thing to do is have her step daughter call the office and let them know they are ready for the repeat mri of the brain as per his telephone encounter and where will it be scheduled  ty  ----- Message -----  From: Wilber Thornton MA  Sent: 8/13/2020   1:53 PM EDT  To: [address] [place] [address]         ----- Message -----  From: DASIA Zhu  Sent: 8/13/2020   9:57 AM EDT  To: Wilber Thornton MA    Can you put out a call to Dr Byrd Look office and find out if he still wants this pt to have another mri of her pituitary? The note says yes, but there is no request in the emr

## 2020-08-14 NOTE — TELEPHONE ENCOUNTER
She is going to call to schedule it next Wednesday, but she thinks it won't be until September that she gets it done  I told her to call us next week to let us know when it is scheduled for, so we can start the prior authorization process

## 2020-08-19 ENCOUNTER — TELEPHONE (OUTPATIENT)
Dept: FAMILY MEDICINE CLINIC | Facility: CLINIC | Age: 80
End: 2020-08-19

## 2020-08-19 NOTE — TELEPHONE ENCOUNTER
Metamucil may bind her up--- miralax and stool softener  Continue the mirtazapine for now and let us now if the constipation does not improve

## 2020-08-19 NOTE — TELEPHONE ENCOUNTER
Patient has been constipated for days from Mirtazapine that she was put on to help her eat more  What can she do?

## 2020-08-19 NOTE — TELEPHONE ENCOUNTER
Increase hydration, lots of water  Miralax as needed and stool softeners such as colace, can take twice a day

## 2020-08-27 ENCOUNTER — TELEPHONE (OUTPATIENT)
Dept: NEUROLOGY | Facility: CLINIC | Age: 80
End: 2020-08-27

## 2020-08-27 NOTE — TELEPHONE ENCOUNTER
Spoke with patient's daughter in law to see if she could come in today or tomorrow with Dr Guy Shannan  She declined but would like to be called if anything opens in September

## 2020-09-02 ENCOUNTER — TELEPHONE (OUTPATIENT)
Dept: NEUROLOGY | Facility: CLINIC | Age: 80
End: 2020-09-02

## 2020-09-02 NOTE — TELEPHONE ENCOUNTER
lvm to schedule sooner followup with dr Roblero Litter - availability today, Wednesday 9/2 @ 10"30 or 2p - please assist if still available

## 2020-09-11 ENCOUNTER — TELEPHONE (OUTPATIENT)
Dept: FAMILY MEDICINE CLINIC | Facility: CLINIC | Age: 80
End: 2020-09-11

## 2020-09-11 ENCOUNTER — OFFICE VISIT (OUTPATIENT)
Dept: FAMILY MEDICINE CLINIC | Facility: CLINIC | Age: 80
End: 2020-09-11
Payer: COMMERCIAL

## 2020-09-11 ENCOUNTER — APPOINTMENT (OUTPATIENT)
Dept: LAB | Facility: HOSPITAL | Age: 80
End: 2020-09-11
Payer: COMMERCIAL

## 2020-09-11 VITALS
OXYGEN SATURATION: 98 % | DIASTOLIC BLOOD PRESSURE: 70 MMHG | HEART RATE: 70 BPM | BODY MASS INDEX: 20.8 KG/M2 | WEIGHT: 113 LBS | SYSTOLIC BLOOD PRESSURE: 108 MMHG | TEMPERATURE: 97.6 F | HEIGHT: 62 IN

## 2020-09-11 DIAGNOSIS — F34.1 DYSTHYMIA: ICD-10-CM

## 2020-09-11 DIAGNOSIS — R06.2 WHEEZE: ICD-10-CM

## 2020-09-11 DIAGNOSIS — R63.4 WEIGHT LOSS: ICD-10-CM

## 2020-09-11 DIAGNOSIS — R42 DIZZINESS: Primary | ICD-10-CM

## 2020-09-11 DIAGNOSIS — N18.30 CKD (CHRONIC KIDNEY DISEASE) STAGE 3, GFR 30-59 ML/MIN (HCC): ICD-10-CM

## 2020-09-11 DIAGNOSIS — R42 DIZZINESS: ICD-10-CM

## 2020-09-11 LAB
ALBUMIN SERPL BCP-MCNC: 4.2 G/DL (ref 3.5–5)
ALP SERPL-CCNC: 68 U/L (ref 46–116)
ALT SERPL W P-5'-P-CCNC: 21 U/L (ref 12–78)
ANION GAP SERPL CALCULATED.3IONS-SCNC: 7 MMOL/L (ref 4–13)
AST SERPL W P-5'-P-CCNC: 15 U/L (ref 5–45)
BASOPHILS # BLD AUTO: 0.1 THOUSANDS/ΜL (ref 0–0.1)
BASOPHILS NFR BLD AUTO: 2 % (ref 0–1)
BILIRUB SERPL-MCNC: 0.9 MG/DL (ref 0.2–1)
BUN SERPL-MCNC: 26 MG/DL (ref 5–25)
CALCIUM SERPL-MCNC: 9.4 MG/DL (ref 8.3–10.1)
CHLORIDE SERPL-SCNC: 105 MMOL/L (ref 100–108)
CO2 SERPL-SCNC: 28 MMOL/L (ref 21–32)
CREAT SERPL-MCNC: 1.64 MG/DL (ref 0.6–1.3)
EOSINOPHIL # BLD AUTO: 0.21 THOUSAND/ΜL (ref 0–0.61)
EOSINOPHIL NFR BLD AUTO: 3 % (ref 0–6)
ERYTHROCYTE [DISTWIDTH] IN BLOOD BY AUTOMATED COUNT: 12.1 % (ref 11.6–15.1)
GFR SERPL CREATININE-BSD FRML MDRD: 29 ML/MIN/1.73SQ M
GLUCOSE P FAST SERPL-MCNC: 95 MG/DL (ref 65–99)
HCT VFR BLD AUTO: 37.5 % (ref 34.8–46.1)
HGB BLD-MCNC: 12.1 G/DL (ref 11.5–15.4)
IMM GRANULOCYTES # BLD AUTO: 0.02 THOUSAND/UL (ref 0–0.2)
IMM GRANULOCYTES NFR BLD AUTO: 0 % (ref 0–2)
LYMPHOCYTES # BLD AUTO: 1.52 THOUSANDS/ΜL (ref 0.6–4.47)
LYMPHOCYTES NFR BLD AUTO: 24 % (ref 14–44)
MCH RBC QN AUTO: 31.9 PG (ref 26.8–34.3)
MCHC RBC AUTO-ENTMCNC: 32.3 G/DL (ref 31.4–37.4)
MCV RBC AUTO: 99 FL (ref 82–98)
MONOCYTES # BLD AUTO: 0.43 THOUSAND/ΜL (ref 0.17–1.22)
MONOCYTES NFR BLD AUTO: 7 % (ref 4–12)
NEUTROPHILS # BLD AUTO: 3.94 THOUSANDS/ΜL (ref 1.85–7.62)
NEUTS SEG NFR BLD AUTO: 64 % (ref 43–75)
NRBC BLD AUTO-RTO: 0 /100 WBCS
PLATELET # BLD AUTO: 358 THOUSANDS/UL (ref 149–390)
PMV BLD AUTO: 9.5 FL (ref 8.9–12.7)
POTASSIUM SERPL-SCNC: 5.5 MMOL/L (ref 3.5–5.3)
PROT SERPL-MCNC: 7.7 G/DL (ref 6.4–8.2)
RBC # BLD AUTO: 3.79 MILLION/UL (ref 3.81–5.12)
SODIUM SERPL-SCNC: 140 MMOL/L (ref 136–145)
TSH SERPL DL<=0.05 MIU/L-ACNC: 3.45 UIU/ML (ref 0.36–3.74)
WBC # BLD AUTO: 6.22 THOUSAND/UL (ref 4.31–10.16)

## 2020-09-11 PROCEDURE — 80053 COMPREHEN METABOLIC PANEL: CPT

## 2020-09-11 PROCEDURE — 84443 ASSAY THYROID STIM HORMONE: CPT

## 2020-09-11 PROCEDURE — 99214 OFFICE O/P EST MOD 30 MIN: CPT | Performed by: NURSE PRACTITIONER

## 2020-09-11 PROCEDURE — 36415 COLL VENOUS BLD VENIPUNCTURE: CPT

## 2020-09-11 PROCEDURE — 85025 COMPLETE CBC W/AUTO DIFF WBC: CPT

## 2020-09-11 RX ORDER — METHYLPREDNISOLONE 4 MG/1
TABLET ORAL
Qty: 21 EACH | Refills: 0 | Status: SHIPPED | OUTPATIENT
Start: 2020-09-11 | End: 2020-12-14 | Stop reason: ALTCHOICE

## 2020-09-11 NOTE — PROGRESS NOTES
Assessment/Plan:     Dizziness   Orthostatic  Blood pressure within normal limits in office  Advised patient to get up slowly  Continue medication as ordered  Will get labs  Advised to maintain safety  Increase fluid hydration    Wheeze   Bilateral upper lobe wheezes heard on assessment  Patient is not febrile  Denies any shortness of breath  Will treat with Medrol dose pack  Encouraged to take deep breaths  Discussed with patient this may also cause dizziness         Problem List Items Addressed This Visit        Other    Dizziness - Primary      Orthostatic  Blood pressure within normal limits in office  Advised patient to get up slowly  Continue medication as ordered  Will get labs  Advised to maintain safety  Increase fluid hydration         Relevant Orders    CBC and differential (Completed)    Comprehensive metabolic panel (Completed)    Wheeze      Bilateral upper lobe wheezes heard on assessment  Patient is not febrile  Denies any shortness of breath  Will treat with Medrol dose pack  Encouraged to take deep breaths  Discussed with patient this may also cause dizziness         Relevant Medications    methylPREDNISolone 4 MG tablet therapy pack            Subjective:      Patient ID: Keesha Brasher is a 78 y o  female  Patient is here with complaints of dizziness  Reports that has been she woke up and got out of bed she was very dizzy  When she took her blood pressures it was 140s over 85 was very concerned  No fevers or chills  Denies any shortness of breath  Has been taking her blood pressure medication as ordered  The following portions of the patient's history were reviewed and updated as appropriate: allergies, current medications, past family history, past medical history, past social history, past surgical history and problem list     Review of Systems   Constitutional: Negative  HENT: Negative  Eyes: Negative  Respiratory: Negative      Cardiovascular: Negative  Gastrointestinal: Positive for constipation (  Intermittent)  Endocrine: Negative  Genitourinary: Negative  Musculoskeletal: Negative  Allergic/Immunologic: Negative  Neurological: Positive for dizziness and light-headedness  Psychiatric/Behavioral: Negative  Objective:      /70 Comment: standing  Pulse 70   Temp 97 6 °F (36 4 °C) (Tympanic)   Ht 5' 2" (1 575 m)   Wt 51 3 kg (113 lb)   LMP  (LMP Unknown)   SpO2 98%   BMI 20 67 kg/m²          Physical Exam  Vitals signs and nursing note reviewed  Constitutional:       Appearance: She is well-developed  HENT:      Head: Normocephalic and atraumatic  Right Ear: External ear normal       Left Ear: External ear normal    Eyes:      Pupils: Pupils are equal, round, and reactive to light  Neck:      Musculoskeletal: Normal range of motion  Cardiovascular:      Rate and Rhythm: Normal rate and regular rhythm  Pulmonary:      Effort: Pulmonary effort is normal       Breath sounds: Wheezing ( bilateral upper lobes) present  Abdominal:      General: Bowel sounds are normal       Palpations: Abdomen is soft  Musculoskeletal: Normal range of motion  Skin:     General: Skin is warm and dry  Neurological:      General: No focal deficit present  Mental Status: She is alert and oriented to person, place, and time  Mental status is at baseline  Coordination: Coordination normal       Gait: Gait normal    Psychiatric:         Mood and Affect: Mood normal          Behavior: Behavior normal          Thought Content:  Thought content normal          Judgment: Judgment normal            Labs:    Lab Results   Component Value Date    WBC 6 22 09/11/2020    HGB 12 1 09/11/2020    HCT 37 5 09/11/2020    MCV 99 (H) 09/11/2020     09/11/2020     Lab Results   Component Value Date    K 5 5 (H) 09/11/2020     09/11/2020    CO2 28 09/11/2020    BUN 26 (H) 09/11/2020    CREATININE 1 64 (H) 09/11/2020 GLUF 95 09/11/2020    CALCIUM 9 4 09/11/2020    AST 15 09/11/2020    ALT 21 09/11/2020    ALKPHOS 68 09/11/2020    EGFR 29 09/11/2020     Lab Results   Component Value Date    CALCIUM 9 4 09/11/2020    K 5 5 (H) 09/11/2020    CO2 28 09/11/2020     09/11/2020    BUN 26 (H) 09/11/2020    CREATININE 1 64 (H) 09/11/2020

## 2020-09-11 NOTE — ASSESSMENT & PLAN NOTE
Orthostatic  Blood pressure within normal limits in office  Advised patient to get up slowly  Continue medication as ordered  Will get labs  Advised to maintain safety    Increase fluid hydration

## 2020-09-11 NOTE — PATIENT INSTRUCTIONS
Medrol Dosepak   get labs done  When getting up in the morning sit for few minutes and then stands slowly maintain safety  Increase fluid and fiber intake    Hypotension   WHAT YOU NEED TO KNOW:   Hypotension is a condition that causes your blood pressure (BP) to drop lower than it should be  Hypotension may be mild, serious, or life-threatening  DISCHARGE INSTRUCTIONS:   Medicines:   · Alpha-adrenoreceptor agonists: These medicines may increase your BP and decrease your symptoms  Take these medicines as directed  · Steroids: This medicine helps prevent salt loss from your body  Steroids may also help increase the amount of fluid in your body and raise your BP  · Vasopressors: These medicines help constrict (make smaller) your blood vessels and increase your BP  Vasopressor medicines may increase the blood flow to your brain and help decrease your symptoms  · Antidiuretic hormone: This medicine helps control your BP and helps decrease your need to urinate during the night  · Antiparkinson medicine: This medicine may help increase your standing BP and decrease your symptoms  · Take your medicine as directed  Contact your healthcare provider if you think your medicine is not helping or if you have side effects  Tell him of her if you are allergic to any medicine  Keep a list of the medicines, vitamins, and herbs you take  Include the amounts, and when and why you take them  Bring the list or the pill bottles to follow-up visits  Carry your medicine list with you in case of an emergency  Follow up with your healthcare provider or specialist as directed:  Write down your questions so you remember to ask them during your visits  Check your blood pressure: You may need to check your BP at home  Record the results and bring them with you to follow-up visits  Ask when and how often to check your BP  You may need to wear a BP monitor for up to 24 hours   This will record your blood pressure during your normal daily activities  The monitor will take your BP every 15 to 30 minutes  Try to keep still while your BP is taken  Avoid heavy activity, such as exercise, while you are wearing the monitor  Manage your symptoms:   · Change positions slowly:  When you get out of bed, sit up first, then slowly move your legs to the side of the bed  If you are not having any symptoms, slowly stand up  If you have symptoms, sit down right away  · Exercise and do physical counter maneuvers:  Ask your healthcare provider or specialist about the best exercise plan for you  Physical counter maneuvers may help to increase your BP and increase blood flow to your heart  They include crossing your legs, squatting, and bending at the waist  You can also rise up on your toes while you are standing, and tighten your thigh muscles  · Drink liquids as directed:  Ask your healthcare provider or specialist how much liquid to drink each day and which liquids are best for you  Drink 500 milliliters (½ liter) of liquid quickly in the morning or before meals to help increase your BP  Your healthcare provider may tell you to drink 2 cups of coffee with, or after, breakfast and lunch  The caffeine in the coffee can help prevent a drop in your BP  Your healthcare provider may also give you caffeine pills  · Change how you eat meals: If your BP drops after eating large meals, try to eat smaller meals more often  Eat foods low in carbohydrates and cholesterol to help prevent BP drops after you eat  Ask if you need to increase the amount of sodium (salt) you eat each day  · Raise the head of your bed:  Raise the head of your bed 4 to 8 inches  This may help prevent morning BP drops and decrease the need to urinate during the night  Avoid things that make your hypotension worse:   · Do not drink alcohol:  Alcohol can make your symptoms worse  Ask for information if you need help quitting      · Avoid straining:  Activities and movements that cause you to strain can cause a drop in your BP  Activities to avoid include lifting, coughing, and other movements that increase the feeling of pressure in your chest     · Avoid the heat: This can cause a decrease in your BP  Stay inside during very hot days, or limit the amount of time you are outside  Do not take hot baths  Contact your healthcare provider or specialist if:   · You vomit several times or have diarrhea, and you cannot drink liquid  · You have a fever  · You have new or increased symptoms, such as dizziness, weakness, or fainting  · Your legs, ankles, and feet are swollen, or you gain weight for no known reason  · You have questions or concerns about your condition or care  Return to the emergency department if:   · You become confused or cannot speak  · You urinate very little or not at all  · You have a seizure  · You have chest pain or trouble breathing  · You have changes in vision or cannot see  © 2017 [address] Information is for End User's use only and may not be sold, redistributed or otherwise used for commercial purposes  All illustrations and images included in CareNotes® are the copyrighted property of [employer]  or [employer]  The above information is an  only  It is not intended as medical advice for individual conditions or treatments  Talk to your doctor, nurse or pharmacist before following any medical regimen to see if it is safe and effective for you

## 2020-09-11 NOTE — TELEPHONE ENCOUNTER
----- Message from Emigd[facility] 2 sent at 9/11/2020  1:16 PM EDT -----  Please call patient's stepdaughter regarding her labs  Take the MiraLax or Metamucil for the constipation because her potassium level was slightly elevated  Increase her fluid intake  Because of her  Her kidney function  Otherwise everything else looks good  Maintain safety

## 2020-09-11 NOTE — ASSESSMENT & PLAN NOTE
Bilateral upper lobe wheezes heard on assessment  Patient is not febrile  Denies any shortness of breath  Will treat with Medrol dose pack  Encouraged to take deep breaths    Discussed with patient this may also cause dizziness

## 2020-09-17 ENCOUNTER — OFFICE VISIT (OUTPATIENT)
Dept: FAMILY MEDICINE CLINIC | Facility: CLINIC | Age: 80
End: 2020-09-17
Payer: COMMERCIAL

## 2020-09-17 ENCOUNTER — APPOINTMENT (OUTPATIENT)
Dept: LAB | Facility: HOSPITAL | Age: 80
End: 2020-09-17
Payer: COMMERCIAL

## 2020-09-17 ENCOUNTER — TELEPHONE (OUTPATIENT)
Dept: FAMILY MEDICINE CLINIC | Facility: CLINIC | Age: 80
End: 2020-09-17

## 2020-09-17 VITALS
HEIGHT: 62 IN | OXYGEN SATURATION: 98 % | TEMPERATURE: 98.2 F | SYSTOLIC BLOOD PRESSURE: 114 MMHG | WEIGHT: 117 LBS | DIASTOLIC BLOOD PRESSURE: 64 MMHG | BODY MASS INDEX: 21.53 KG/M2 | HEART RATE: 89 BPM

## 2020-09-17 DIAGNOSIS — F34.1 DYSTHYMIA: Primary | ICD-10-CM

## 2020-09-17 DIAGNOSIS — E87.5 HYPERKALEMIA: ICD-10-CM

## 2020-09-17 DIAGNOSIS — N18.4 CKD (CHRONIC KIDNEY DISEASE) STAGE 4, GFR 15-29 ML/MIN (HCC): ICD-10-CM

## 2020-09-17 DIAGNOSIS — E87.5 HYPERKALEMIA: Primary | ICD-10-CM

## 2020-09-17 DIAGNOSIS — R01.1 HEART MURMUR: ICD-10-CM

## 2020-09-17 DIAGNOSIS — L85.3 DRY SKIN: ICD-10-CM

## 2020-09-17 DIAGNOSIS — R63.5 WEIGHT GAIN: ICD-10-CM

## 2020-09-17 DIAGNOSIS — N18.30 CKD (CHRONIC KIDNEY DISEASE) STAGE 3, GFR 30-59 ML/MIN (HCC): ICD-10-CM

## 2020-09-17 LAB
ANION GAP SERPL CALCULATED.3IONS-SCNC: 2 MMOL/L (ref 4–13)
BACTERIA UR QL AUTO: NORMAL /HPF
BILIRUB UR QL STRIP: NEGATIVE
BUN SERPL-MCNC: 30 MG/DL (ref 5–25)
CALCIUM SERPL-MCNC: 9.8 MG/DL (ref 8.3–10.1)
CHLORIDE SERPL-SCNC: 103 MMOL/L (ref 100–108)
CLARITY UR: CLEAR
CO2 SERPL-SCNC: 33 MMOL/L (ref 21–32)
COLOR UR: YELLOW
CREAT SERPL-MCNC: 1.49 MG/DL (ref 0.6–1.3)
CREAT UR-MCNC: 25.1 MG/DL
GFR SERPL CREATININE-BSD FRML MDRD: 32 ML/MIN/1.73SQ M
GLUCOSE P FAST SERPL-MCNC: 97 MG/DL (ref 65–99)
GLUCOSE UR STRIP-MCNC: NEGATIVE MG/DL
HGB UR QL STRIP.AUTO: NEGATIVE
KETONES UR STRIP-MCNC: NEGATIVE MG/DL
LEUKOCYTE ESTERASE UR QL STRIP: NEGATIVE
MICROALBUMIN UR-MCNC: <5 MG/L (ref 0–20)
MICROALBUMIN/CREAT 24H UR: <20 MG/G CREATININE (ref 0–30)
NITRITE UR QL STRIP: NEGATIVE
NON-SQ EPI CELLS URNS QL MICRO: NORMAL /HPF
PH UR STRIP.AUTO: 5.5 [PH]
POTASSIUM SERPL-SCNC: 5.6 MMOL/L (ref 3.5–5.3)
PROT UR STRIP-MCNC: NEGATIVE MG/DL
RBC #/AREA URNS AUTO: NORMAL /HPF
SODIUM SERPL-SCNC: 138 MMOL/L (ref 136–145)
SP GR UR STRIP.AUTO: <=1.005 (ref 1–1.03)
UROBILINOGEN UR QL STRIP.AUTO: 0.2 E.U./DL
WBC #/AREA URNS AUTO: NORMAL /HPF

## 2020-09-17 PROCEDURE — 81001 URINALYSIS AUTO W/SCOPE: CPT | Performed by: FAMILY MEDICINE

## 2020-09-17 PROCEDURE — 82570 ASSAY OF URINE CREATININE: CPT | Performed by: FAMILY MEDICINE

## 2020-09-17 PROCEDURE — 36415 COLL VENOUS BLD VENIPUNCTURE: CPT

## 2020-09-17 PROCEDURE — 82043 UR ALBUMIN QUANTITATIVE: CPT | Performed by: FAMILY MEDICINE

## 2020-09-17 PROCEDURE — 99214 OFFICE O/P EST MOD 30 MIN: CPT | Performed by: FAMILY MEDICINE

## 2020-09-17 PROCEDURE — 80048 BASIC METABOLIC PNL TOTAL CA: CPT

## 2020-09-17 NOTE — TELEPHONE ENCOUNTER
----- Message from [address] [place] [address]  sent at 9/17/2020 12:49 PM EDT -----  Please let Kota Yadav know that Lirdarien's K+ is still elevated and I want her to stop the lisinopril as discussed and will repeat the bmp in 1 week  I am also setting her up with nephrology

## 2020-09-17 NOTE — TELEPHONE ENCOUNTER
called patient and n/a lmov for her to call back also give her the number to [facility]  office for nephrology [phone]

## 2020-09-17 NOTE — PROGRESS NOTES
Assessment/Plan:     Chronic Problems:  CKD (chronic kidney disease) stage 4, GFR 15-29 ml/min ([facility])  Suspect this may have been related to possible dehydration at the time  Will repeat the BMP now  Patient was also advised to stop her lisinopril and check the blood pressures at home  Call here in about 3 weeks with blood pressures from home  If the new blood test shows CKD 4 I will repeat in 2 weeks off the lisinopril and patient was advised to hydrate with fluids  Dysthymia  Better on meds  Heart murmur  Having her echo on Monday  Nodule of left lung  No further w/u needed as pt is low risk for lung cancer  Visit Diagnosis:  Diagnoses and all orders for this visit:    Dysthymia    Heart murmur    CKD (chronic kidney disease) stage 4, GFR 15-29 ml/min ([facility])  -     Basic metabolic panel; Future    Hyperkalemia  Comments:  Suspect hemolyzed  will recheck now and if still with ckd with recheck in 2 weeks off lisinopril  Orders:  -     Basic metabolic panel; Future    Weight gain  Comments:  Gained 5 lbs since the last appt on Mirtazapine  Continue the same  Dry skin  Comments:    Must use Aquaphor or Eucerin on the skin twice daily  If no better will consider Lac-Hydrin  Subjective:    Patient ID: Isaac Graham is a 78 y o  female  Pt is here ac by her step daughter Julio C Marley with c/o feeling dizzy when getting out of bed  No syncope  Did not fall, and when she tried to get out of bed letter she states she was fine and has been good since  Very careful in the am's now and doing well  She also just finished prednisone for wheezing but pt states she had no sob  Today feels fine with no concerns  Had labs done and was told that her potassium was high  Has been constipated since on mirtazapine and now on metamucil powder and cookies and now having a bm  Feels she is less sad, sleeping better, eating more  Gained 5 lbs since the last appt  Takes all other meds as directed   No side effects noted  Pt is taking her bp's at home and reports 111/72  The following portions of the patient's history were reviewed and updated as appropriate: allergies, current medications, past family history, past medical history, past social history, past surgical history and problem list     Review of Systems   Constitutional: Negative for chills, diaphoresis, fatigue and fever  HENT: Negative  Eyes: Negative  Respiratory: Positive for cough (from the dry air pumps in the home  Feels the cough is a little wet  Ac to LEPPEN she has had this cough forever  )  Negative for shortness of breath and wheezing  Cardiovascular: Negative for chest pain and palpitations  Gastrointestinal: Positive for constipation (better on fiber bars  )  Negative for abdominal pain, diarrhea, nausea and vomiting  Genitourinary: Negative  Neurological: Negative for dizziness, light-headedness and headaches  Psychiatric/Behavioral: Negative for dysphoric mood and sleep disturbance  The patient is not nervous/anxious  Feels she is doing better on meds  /64   Pulse 89   Temp 98 2 °F (36 8 °C)   Ht 5' 2" (1 575 m)   Wt 53 1 kg (117 lb)   LMP  (LMP Unknown)   SpO2 98%   BMI 21 40 kg/m²   Social History     Socioeconomic History    Marital status:      Spouse name: Not on file    Number of children: Not on file    Years of education: Not on file    Highest education level: Not on file   Occupational History    Not on file   Social Needs    Financial resource strain: Not on file    Food insecurity     Worry: Not on file     Inability: Not on file    Transportation needs     Medical: Not on file     Non-medical: Not on file   Tobacco Use    Smoking status: Never Smoker    Smokeless tobacco: Never Used   Substance and Sexual Activity    Alcohol use:  Yes     Alcohol/week: 7 0 standard drinks     Types: 7 Glasses of wine per week     Frequency: Monthly or less     Comment: socially     Drug use: No    Sexual activity: Never   Lifestyle    Physical activity     Days per week: Not on file     Minutes per session: Not on file    Stress: Not on file   Relationships    Social connections     Talks on phone: Not on file     Gets together: Not on file     Attends [employer] service: Not on file     Active member of club or organization: Not on file     Attends meetings of clubs or organizations: Not on file     Relationship status: Not on file    Intimate partner violence     Fear of current or ex partner: Not on file     Emotionally abused: Not on file     Physically abused: Not on file     Forced sexual activity: Not on file   Other Topics Concern    Not on file   Social History Narrative    Not on file     Past Medical History:   Diagnosis Date    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     Aortic aneurysm ([facility] )     4 1 cm on December 6, 2018    CKD (chronic kidney disease) stage 2, GFR 60-89 ml/min     Colon polyps     Dementia ([facility] [address] )     Hyperlipidemia     Hypertension     Insomnia     Memory loss     Osteopenia with elevated frax score     Osteoporosis     Tubular adenoma of colon 04/2019    Uterine fibroid      Family History   Problem Relation Age of Onset    Cancer Mother     Cancer Father      Past Surgical History:   Procedure Laterality Date    COLONOSCOPY  9928    date not certain     FOOT SURGERY      MI COLONOSCOPY FLX DX W/COLLJ SPEC WHEN PFRMD N/A 4/24/2019    Procedure: COLONOSCOPY;  Surgeon: Miroslava Suero MD;  Location: [facility]; Service: Gastroenterology    MI ESOPHAGOGASTRODUODENOSCOPY TRANSORAL DIAGNOSTIC N/A 4/24/2019    Procedure: ESOPHAGOGASTRODUODENOSCOPY (EGD); Surgeon: Miroslava Suero MD;  Location: [facility];   Service: Gastroenterology       Current Outpatient Medications:     aspirin 81 MG tablet, Take 1 tablet (81 mg total) by mouth daily, Disp: , Rfl:     memantine (NAMENDA) 10 mg tablet, Take 1 tablet (10 mg total) by mouth 2 (two) times a day, Disp: 180 tablet, Rfl: 1    methylPREDNISolone 4 MG tablet therapy pack, Use as directed on package, Disp: 21 each, Rfl: 0    mirtazapine (REMERON) 15 mg tablet, Take 1 tablet (15 mg total) by mouth daily at bedtime, Disp: 30 tablet, Rfl: 5    pantoprazole (PROTONIX) 40 mg tablet, TAKE ONE TABLET BY MOUTH EVERY DAY, Disp: 90 tablet, Rfl: 1    Zoster Vac Recomb Adjuvanted (Shingrix) 50 MCG/0 5ML SUSR, 0 5mL IM for one dose, followed by 0 5mL IM 2-6 months after first dose (Patient not taking: Reported on 9/11/2020), Disp: 1 each, Rfl: 1    Allergies   Allergen Reactions    Cefazolin      Other reaction(s): Unknown          Lab Review   Appointment on 09/11/2020   Component Date Value    WBC 09/11/2020 6 22     RBC 09/11/2020 3 79*    Hemoglobin 09/11/2020 12 1     Hematocrit 09/11/2020 37 5     MCV 09/11/2020 99*    MCH 09/11/2020 31 9     MCHC 09/11/2020 32 3     RDW 09/11/2020 12 1     MPV 09/11/2020 9 5     Platelets 23/15/0851 358     nRBC 09/11/2020 0     Neutrophils Relative 09/11/2020 64     Immat GRANS % 09/11/2020 0     Lymphocytes Relative 09/11/2020 24     Monocytes Relative 09/11/2020 7     Eosinophils Relative 09/11/2020 3     Basophils Relative 09/11/2020 2*    Neutrophils Absolute 09/11/2020 3 94     Immature Grans Absolute 09/11/2020 0 02     Lymphocytes Absolute 09/11/2020 1 52     Monocytes Absolute 09/11/2020 0 43     Eosinophils Absolute 09/11/2020 0 21     Basophils Absolute 09/11/2020 0 10     Sodium 09/11/2020 140     Potassium 09/11/2020 5 5*    Chloride 09/11/2020 105     CO2 09/11/2020 28     ANION GAP 09/11/2020 7     BUN 09/11/2020 26*    Creatinine 09/11/2020 1 64*    Glucose, Fasting 09/11/2020 95     Calcium 09/11/2020 9 4     AST 09/11/2020 15     ALT 09/11/2020 21     Alkaline Phosphatase 09/11/2020 68     Total Protein 09/11/2020 7 7     Albumin 09/11/2020 4 2     Total Bilirubin 09/11/2020 0 90     eGFR 09/11/2020 29     TSH 3RD [facility] 09/11/2020 3 452         Imaging: No results found  Objective:     Physical Exam  Constitutional:       General: She is not in acute distress  Appearance: Normal appearance  She is normal weight  She is not ill-appearing, toxic-appearing or diaphoretic  HENT:      Head: Normocephalic and atraumatic  Right Ear: Tympanic membrane, ear canal and external ear normal  There is no impacted cerumen  Left Ear: Tympanic membrane and ear canal normal  There is no impacted cerumen  Nose: Nose normal       Mouth/Throat:      Mouth: Mucous membranes are moist       Pharynx: No oropharyngeal exudate  Eyes:      General:         Right eye: No discharge  Left eye: No discharge  Conjunctiva/sclera: Conjunctivae normal       Pupils: Pupils are equal, round, and reactive to light  Neck:      Musculoskeletal: Normal range of motion and neck supple  Cardiovascular:      Rate and Rhythm: Normal rate and regular rhythm  Heart sounds: Murmur (Grade 1/6 systolic murmur) present  Pulmonary:      Effort: Pulmonary effort is normal  No respiratory distress  Breath sounds: Normal breath sounds  No wheezing  Musculoskeletal: Normal range of motion  General: No swelling or tenderness  Skin:     General: Skin is warm and dry  Capillary Refill: Capillary refill takes less than 2 seconds  Comments: Very dry flaky skin on arms and legs  Neurological:      General: No focal deficit present  Mental Status: She is alert and oriented to person, place, and time  Psychiatric:         Mood and Affect: Mood normal          Behavior: Behavior normal          Thought Content: Thought content normal          Judgment: Judgment normal            Patient Instructions     Discussed all with patient and her [address] [place] [address] Road  For now will stop the lisinopril as blood pressure here and at home is too low    Make sure your hydrating to help with the constipation and to help with the kidneys  I will repeat the blood test now as the potassium was elevated and the kidney function was down  I suspect the potassium was elevated related to hemolysis which has nothing to do with you but how the blood was drawn  If she still has CKD 4 I will recheck the BMP in 2 weeks off lisinopril  Continue all your current medications  Let us know if you are able to get the high-dose flu shot if not return in October for high-dose low   either Eucerin or Aquaphor and use on the body twice daily  If the skin is still flaky and scaly call here in about a week and I will try to order DASIA Santos    Portions of the record may have been created with voice recognition software  Occasional wrong word or "sound a like" substitutions may have occurred due to the inherent limitations of voice recognition software  Read the chart carefully and recognize, using context, where substitutions have occurred

## 2020-09-17 NOTE — TELEPHONE ENCOUNTER
I offered to give her that phone number  It sounded like she was driving and she wanted to speak to you  She said you can call her back after 9 tomorrow

## 2020-09-17 NOTE — ASSESSMENT & PLAN NOTE
Suspect this may have been related to possible dehydration at the time  Will repeat the BMP now  Patient was also advised to stop her lisinopril and check the blood pressures at home  Call here in about 3 weeks with blood pressures from home  If the new blood test shows CKD 4 I will repeat in 2 weeks off the lisinopril and patient was advised to hydrate with fluids

## 2020-09-17 NOTE — PATIENT INSTRUCTIONS
Discussed all with patient and her [address] [place] [address] Road  For now will stop the lisinopril as blood pressure here and at home is too low  Make sure your hydrating to help with the constipation and to help with the kidneys  I will repeat the blood test now as the potassium was elevated and the kidney function was down  I suspect the potassium was elevated related to hemolysis which has nothing to do with you but how the blood was drawn  If she still has CKD 4 I will recheck the BMP in 2 weeks off lisinopril  Continue all your current medications  Let us know if you are able to get the high-dose flu shot if not return in October for high-dose low   either Eucerin or Aquaphor and use on the body twice daily  If the skin is still flaky and scaly call here in about a week and I will try to order Lac hydrin

## 2020-09-18 NOTE — TELEPHONE ENCOUNTER
Spoke with patient grand daughter and she is aware  I did explain to her everything  She is concerned if she might be starting in kidney failure and should she dae worried  I let her know not to worry because if it was urgency you would of had me schedule her like yesterday  I did let her know that she still should see a specialist in regards to this though   She will have her repeat her bmp in 1 week as well

## 2020-09-18 NOTE — TELEPHONE ENCOUNTER
Please call back Robert Guerrero [phone] she responded she is not calling another doctor until she speaks with you

## 2020-09-21 ENCOUNTER — TELEPHONE (OUTPATIENT)
Dept: NEUROLOGY | Facility: CLINIC | Age: 80
End: 2020-09-21

## 2020-09-21 ENCOUNTER — TELEPHONE (OUTPATIENT)
Dept: NEPHROLOGY | Facility: CLINIC | Age: 80
End: 2020-09-21

## 2020-09-21 ENCOUNTER — HOSPITAL ENCOUNTER (OUTPATIENT)
Dept: NON INVASIVE DIAGNOSTICS | Facility: CLINIC | Age: 80
Discharge: HOME | End: 2020-09-21
Payer: COMMERCIAL

## 2020-09-21 DIAGNOSIS — I71.2 ASCENDING AORTIC ANEURYSM (HCC): ICD-10-CM

## 2020-09-21 DIAGNOSIS — R63.4 WEIGHT LOSS: ICD-10-CM

## 2020-09-21 PROCEDURE — 93306 TTE W/DOPPLER COMPLETE: CPT

## 2020-09-21 PROCEDURE — 93306 TTE W/DOPPLER COMPLETE: CPT | Performed by: INTERNAL MEDICINE

## 2020-09-21 NOTE — TELEPHONE ENCOUNTER
Called patient's daughter, Mary Lou Mack, to schedule consult   Kirby Patient stated she was only able to do any appts between 10 -12 and only Dr Shaniqua Starks  Patient was scheduled for 12/3 with Dr Shaniqua Starks and added tot he wait list

## 2020-09-25 ENCOUNTER — APPOINTMENT (OUTPATIENT)
Dept: LAB | Facility: HOSPITAL | Age: 80
End: 2020-09-25
Payer: COMMERCIAL

## 2020-09-25 DIAGNOSIS — N18.30 CKD (CHRONIC KIDNEY DISEASE) STAGE 3, GFR 30-59 ML/MIN (HCC): ICD-10-CM

## 2020-09-25 DIAGNOSIS — E87.5 HYPERKALEMIA: ICD-10-CM

## 2020-09-25 LAB
ANION GAP SERPL CALCULATED.3IONS-SCNC: 7 MMOL/L (ref 4–13)
BUN SERPL-MCNC: 23 MG/DL (ref 5–25)
CALCIUM SERPL-MCNC: 9.4 MG/DL (ref 8.3–10.1)
CHLORIDE SERPL-SCNC: 103 MMOL/L (ref 100–108)
CO2 SERPL-SCNC: 30 MMOL/L (ref 21–32)
CREAT SERPL-MCNC: 1.45 MG/DL (ref 0.6–1.3)
GFR SERPL CREATININE-BSD FRML MDRD: 34 ML/MIN/1.73SQ M
GLUCOSE P FAST SERPL-MCNC: 108 MG/DL (ref 65–99)
POTASSIUM SERPL-SCNC: 3.9 MMOL/L (ref 3.5–5.3)
SODIUM SERPL-SCNC: 140 MMOL/L (ref 136–145)

## 2020-09-25 PROCEDURE — 80048 BASIC METABOLIC PNL TOTAL CA: CPT

## 2020-09-25 PROCEDURE — 36415 COLL VENOUS BLD VENIPUNCTURE: CPT

## 2020-10-09 ENCOUNTER — OFFICE VISIT (OUTPATIENT)
Dept: NEUROLOGY | Facility: CLINIC | Age: 80
End: 2020-10-09
Payer: COMMERCIAL

## 2020-10-09 VITALS
TEMPERATURE: 99.8 F | DIASTOLIC BLOOD PRESSURE: 78 MMHG | WEIGHT: 117 LBS | BODY MASS INDEX: 21.53 KG/M2 | HEIGHT: 62 IN | SYSTOLIC BLOOD PRESSURE: 148 MMHG | HEART RATE: 75 BPM

## 2020-10-09 DIAGNOSIS — G30.1 LATE ONSET ALZHEIMER'S DISEASE WITHOUT BEHAVIORAL DISTURBANCE (HCC): Primary | ICD-10-CM

## 2020-10-09 DIAGNOSIS — N18.4 CKD (CHRONIC KIDNEY DISEASE) STAGE 4, GFR 15-29 ML/MIN (HCC): ICD-10-CM

## 2020-10-09 DIAGNOSIS — F02.80 LATE ONSET ALZHEIMER'S DISEASE WITHOUT BEHAVIORAL DISTURBANCE (HCC): Primary | ICD-10-CM

## 2020-10-09 DIAGNOSIS — E23.6 PITUITARY MASS (HCC): ICD-10-CM

## 2020-10-09 PROCEDURE — 99214 OFFICE O/P EST MOD 30 MIN: CPT | Performed by: PSYCHIATRY & NEUROLOGY

## 2020-10-09 RX ORDER — MEMANTINE HYDROCHLORIDE 10 MG/1
10 TABLET ORAL 2 TIMES DAILY
Qty: 180 TABLET | Refills: 1 | Status: SHIPPED | OUTPATIENT
Start: 2020-10-09 | End: 2021-02-04 | Stop reason: SDUPTHER

## 2020-12-01 ENCOUNTER — TELEPHONE (OUTPATIENT)
Dept: FAMILY MEDICINE CLINIC | Facility: CLINIC | Age: 80
End: 2020-12-01

## 2020-12-01 ENCOUNTER — TELEPHONE (OUTPATIENT)
Dept: NEPHROLOGY | Facility: CLINIC | Age: 80
End: 2020-12-01

## 2020-12-14 ENCOUNTER — LAB (OUTPATIENT)
Dept: LAB | Facility: HOSPITAL | Age: 80
End: 2020-12-14
Payer: COMMERCIAL

## 2020-12-14 ENCOUNTER — OFFICE VISIT (OUTPATIENT)
Dept: FAMILY MEDICINE CLINIC | Facility: CLINIC | Age: 80
End: 2020-12-14
Payer: COMMERCIAL

## 2020-12-14 ENCOUNTER — TELEPHONE (OUTPATIENT)
Dept: FAMILY MEDICINE CLINIC | Facility: CLINIC | Age: 80
End: 2020-12-14

## 2020-12-14 VITALS
TEMPERATURE: 97.1 F | SYSTOLIC BLOOD PRESSURE: 116 MMHG | HEART RATE: 84 BPM | WEIGHT: 116 LBS | OXYGEN SATURATION: 97 % | BODY MASS INDEX: 21.35 KG/M2 | DIASTOLIC BLOOD PRESSURE: 70 MMHG | HEIGHT: 62 IN

## 2020-12-14 DIAGNOSIS — I10 BENIGN HYPERTENSION: Primary | ICD-10-CM

## 2020-12-14 DIAGNOSIS — I10 BENIGN HYPERTENSION: ICD-10-CM

## 2020-12-14 DIAGNOSIS — N18.32 STAGE 3B CHRONIC KIDNEY DISEASE (HCC): ICD-10-CM

## 2020-12-14 DIAGNOSIS — I35.8 AORTIC VALVE SCLEROSIS: ICD-10-CM

## 2020-12-14 DIAGNOSIS — I71.2 ASCENDING AORTIC ANEURYSM (HCC): ICD-10-CM

## 2020-12-14 LAB
ANION GAP SERPL CALCULATED.3IONS-SCNC: 9 MMOL/L (ref 4–13)
BUN SERPL-MCNC: 16 MG/DL (ref 5–25)
CALCIUM SERPL-MCNC: 9.1 MG/DL (ref 8.3–10.1)
CHLORIDE SERPL-SCNC: 106 MMOL/L (ref 100–108)
CO2 SERPL-SCNC: 29 MMOL/L (ref 21–32)
CREAT SERPL-MCNC: 1.38 MG/DL (ref 0.6–1.3)
GFR SERPL CREATININE-BSD FRML MDRD: 36 ML/MIN/1.73SQ M
GLUCOSE P FAST SERPL-MCNC: 133 MG/DL (ref 65–99)
POTASSIUM SERPL-SCNC: 3.7 MMOL/L (ref 3.5–5.3)
SODIUM SERPL-SCNC: 144 MMOL/L (ref 136–145)

## 2020-12-14 PROCEDURE — 80048 BASIC METABOLIC PNL TOTAL CA: CPT

## 2020-12-14 PROCEDURE — 36415 COLL VENOUS BLD VENIPUNCTURE: CPT

## 2020-12-14 PROCEDURE — 99214 OFFICE O/P EST MOD 30 MIN: CPT | Performed by: FAMILY MEDICINE

## 2020-12-14 RX ORDER — LISINOPRIL 5 MG/1
5 TABLET ORAL DAILY
Qty: 30 TABLET | Refills: 0
Start: 2020-12-14 | End: 2021-01-21

## 2020-12-18 ENCOUNTER — TELEPHONE (OUTPATIENT)
Dept: FAMILY MEDICINE CLINIC | Facility: CLINIC | Age: 80
End: 2020-12-18

## 2020-12-21 ENCOUNTER — TELEPHONE (OUTPATIENT)
Dept: NEPHROLOGY | Facility: CLINIC | Age: 80
End: 2020-12-21

## 2020-12-22 ENCOUNTER — TELEPHONE (OUTPATIENT)
Dept: FAMILY MEDICINE CLINIC | Facility: CLINIC | Age: 80
End: 2020-12-22

## 2021-01-13 ENCOUNTER — TELEPHONE (OUTPATIENT)
Dept: NEPHROLOGY | Facility: CLINIC | Age: 81
End: 2021-01-13

## 2021-01-14 ENCOUNTER — CONSULT (OUTPATIENT)
Dept: NEPHROLOGY | Facility: CLINIC | Age: 81
End: 2021-01-14
Payer: COMMERCIAL

## 2021-01-14 VITALS
HEIGHT: 62 IN | HEART RATE: 68 BPM | SYSTOLIC BLOOD PRESSURE: 120 MMHG | WEIGHT: 116.8 LBS | TEMPERATURE: 97.1 F | RESPIRATION RATE: 16 BRPM | BODY MASS INDEX: 21.49 KG/M2 | DIASTOLIC BLOOD PRESSURE: 70 MMHG

## 2021-01-14 DIAGNOSIS — G30.1 LATE ONSET ALZHEIMER'S DISEASE WITHOUT BEHAVIORAL DISTURBANCE (HCC): ICD-10-CM

## 2021-01-14 DIAGNOSIS — N18.9 CHRONIC KIDNEY DISEASE-MINERAL AND BONE DISORDER: ICD-10-CM

## 2021-01-14 DIAGNOSIS — N18.32 STAGE 3B CHRONIC KIDNEY DISEASE (HCC): Primary | ICD-10-CM

## 2021-01-14 DIAGNOSIS — I10 BENIGN HYPERTENSION: ICD-10-CM

## 2021-01-14 DIAGNOSIS — N18.30 CKD (CHRONIC KIDNEY DISEASE) STAGE 3, GFR 30-59 ML/MIN (HCC): ICD-10-CM

## 2021-01-14 DIAGNOSIS — E83.9 CHRONIC KIDNEY DISEASE-MINERAL AND BONE DISORDER: ICD-10-CM

## 2021-01-14 DIAGNOSIS — M89.9 CHRONIC KIDNEY DISEASE-MINERAL AND BONE DISORDER: ICD-10-CM

## 2021-01-14 DIAGNOSIS — F02.80 LATE ONSET ALZHEIMER'S DISEASE WITHOUT BEHAVIORAL DISTURBANCE (HCC): ICD-10-CM

## 2021-01-14 DIAGNOSIS — E87.5 HYPERKALEMIA: ICD-10-CM

## 2021-01-14 PROBLEM — M81.0 OSTEOPOROSIS: Status: ACTIVE | Noted: 2021-01-14

## 2021-01-14 PROCEDURE — 99204 OFFICE O/P NEW MOD 45 MIN: CPT | Performed by: INTERNAL MEDICINE

## 2021-01-14 NOTE — ASSESSMENT & PLAN NOTE
Lab Results   Component Value Date    EGFR 36 12/14/2020    EGFR 34 09/25/2020    EGFR 32 09/17/2020    CREATININE 1 38 (H) 12/14/2020    CREATININE 1 45 (H) 09/25/2020    CREATININE 1 49 (H) 09/17/2020    patient does seems to have stage III CKD  Looking at blood from the past it is stable over period of time  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 is hypertensive nephrosclerosis  Pathophysiology of kidney disease discussed with her and her daughter  Importance of hydration and avoiding nephrotoxic medicine also discussed with her  I will repeat blood test and urine test   Will also get kidney ultrasound     Patient is on ACE-inhibitor which I will continue    Patient had history of hyperkalemia so I discussed diet with her

## 2021-01-14 NOTE — ASSESSMENT & PLAN NOTE
Lab Results   Component Value Date    EGFR 36 12/14/2020    EGFR 34 09/25/2020    EGFR 32 09/17/2020    CREATININE 1 38 (H) 12/14/2020    CREATININE 1 45 (H) 09/25/2020    CREATININE 1 49 (H) 09/17/2020    PTH and phosphorus along with vitamin-D also will be checked as part of the CKD management

## 2021-01-14 NOTE — PATIENT INSTRUCTIONS

## 2021-01-14 NOTE — PROGRESS NOTES
NEPHROLOGY OFFICE CONSULT  Sowmya Sol 78 y o  female MRN: [id number]    Encounter: [id number] 1/14/2021    REASON FOR VISIT: Sowmya Sol is a 78 y  o female who was referred by Mayra Ahumada, CRNP for evaluation of Chronic Kidney Disease and Consult    HPI:    Jonathan Miner in today for evaluation management of CKD  26-year-old woman with history of hypertension who is doing well overall  Also problem dementia    She denies any other acute complaint no chest pain no palpitation or shortness of breath     Denies any urinary complaint    Does have some joint pain but denies taking nonsteroidal pain killer    No nausea no vomiting no diarrhea      REVIEW OF SYSTEMS:    Review of Systems   Constitutional: Negative for activity change and fatigue  HENT: Negative for congestion and ear discharge  Eyes: Negative for photophobia and pain  Respiratory: Negative for apnea and choking  Cardiovascular: Negative for chest pain and palpitations  Gastrointestinal: Negative for abdominal distention and blood in stool  Endocrine: Negative for heat intolerance and polyphagia  Genitourinary: Negative for flank pain and urgency  Musculoskeletal: Negative for neck pain and neck stiffness  Skin: Negative for color change and wound  Allergic/Immunologic: Negative for food allergies and immunocompromised state  Neurological: Negative for seizures and facial asymmetry  Hematological: Negative for adenopathy  Does not bruise/bleed easily  Psychiatric/Behavioral: Negative for self-injury and suicidal ideas           PAST MEDICAL HISTORY:  Past Medical History:   Diagnosis Date    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     Aortic aneurysm ([facility])     4 1 cm on December 6, 2018    Chronic kidney disease     CKD (chronic kidney disease) stage 2, GFR 60-89 ml/min     Colon polyps     Dementia (HCC)     Hyperlipidemia     Hypertension     Insomnia     Memory loss     Osteopenia with elevated frax score     Osteoporosis     Tubular adenoma of colon 04/2019    Uterine fibroid        PAST SURGICAL HISTORY:  Past Surgical History:   Procedure Laterality Date    COLONOSCOPY  7945    date not certain     FOOT SURGERY      ME COLONOSCOPY FLX DX W/COLLJ SPEC WHEN PFRMD N/A 4/24/2019    Procedure: COLONOSCOPY;  Surgeon: Kelsey Floyd MD;  Location: [facility]; Service: Gastroenterology    ME ESOPHAGOGASTRODUODENOSCOPY TRANSORAL DIAGNOSTIC N/A 4/24/2019    Procedure: ESOPHAGOGASTRODUODENOSCOPY (EGD); Surgeon: Kelsey Floyd MD;  [facility];   Service: Gastroenterology       SOCIAL HISTORY:  Social History     Substance and Sexual Activity   Alcohol Use Yes    Alcohol/week: 7 0 standard drinks    Types: 7 Glasses of wine per week    Frequency: Monthly or less    Comment: socially      Social History     Substance and Sexual Activity   Drug Use No     Social History     Tobacco Use   Smoking Status Never Smoker   Smokeless Tobacco Never Used       FAMILY HISTORY:  Family History   Problem Relation Age of Onset    Cancer Mother     Cancer Father        MEDICATIONS:    Current Outpatient Medications:     aspirin 81 MG tablet, Take 1 tablet (81 mg total) by mouth daily, Disp: , Rfl:     lisinopril (ZESTRIL) 5 mg tablet, Take 1 tablet (5 mg total) by mouth daily, Disp: 30 tablet, Rfl: 0    memantine (NAMENDA) 10 mg tablet, Take 1 tablet (10 mg total) by mouth 2 (two) times a day, Disp: 180 tablet, Rfl: 1    mirtazapine (REMERON) 15 mg tablet, Take 1 tablet (15 mg total) by mouth daily at bedtime, Disp: 30 tablet, Rfl: 5    pantoprazole (PROTONIX) 40 mg tablet, TAKE ONE TABLET BY MOUTH EVERY DAY, Disp: 90 tablet, Rfl: 1    Zoster Vac Recomb Adjuvanted (Shingrix) 50 MCG/0 5ML SUSR, 0 5mL IM for one dose, followed by 0 5mL IM 2-6 months after first dose, Disp: 1 each, Rfl: 1    PHYSICAL EXAM:  Vitals:    01/14/21 1038   BP: 120/70   BP Location: Right arm   Patient Position: Sitting   Pulse: 68   Resp: 16   Temp: (!) 97 1 °F (36 2 °C)   TempSrc: Temporal   Weight: 53 kg (116 lb 12 8 oz)   Height: 5' 1 5" (1 562 m)     Body mass index is 21 71 kg/m²  Physical Exam  Constitutional:       General: She is not in acute distress  Appearance: Normal appearance  She is well-developed  HENT:      Head: Normocephalic  Eyes:      General: No scleral icterus  Conjunctiva/sclera: Conjunctivae normal    Neck:      Musculoskeletal: Normal range of motion and neck supple  Vascular: No JVD  Cardiovascular:      Rate and Rhythm: Normal rate and regular rhythm  Heart sounds: Normal heart sounds  Pulmonary:      Effort: Pulmonary effort is normal       Breath sounds: Normal breath sounds  No wheezing  Abdominal:      General: Bowel sounds are normal       Palpations: Abdomen is soft  Tenderness: There is no abdominal tenderness  Musculoskeletal: Normal range of motion  Skin:     General: Skin is warm  Findings: No rash  Neurological:      Mental Status: She is alert and oriented to person, place, and time  Psychiatric:         Behavior: Behavior normal          LAB RESULTS:  Results for orders placed or performed in visit on 81/94/36   Basic metabolic panel   Result Value Ref Range    Sodium 144 136 - 145 mmol/L    Potassium 3 7 3 5 - 5 3 mmol/L    Chloride 106 100 - 108 mmol/L    CO2 29 21 - 32 mmol/L    ANION GAP 9 4 - 13 mmol/L    BUN 16 5 - 25 mg/dL    Creatinine 1 38 (H) 0 60 - 1 30 mg/dL    Glucose, Fasting 133 (H) 65 - 99 mg/dL    Calcium 9 1 8 3 - 10 1 mg/dL    eGFR 36 ml/min/1 73sq m       ASSESSMENT and PLAN:    Stage 3b chronic kidney disease  Lab Results   Component Value Date    EGFR 36 12/14/2020    EGFR 34 09/25/2020    EGFR 32 09/17/2020    CREATININE 1 38 (H) 12/14/2020    CREATININE 1 45 (H) 09/25/2020    CREATININE 1 49 (H) 09/17/2020    patient does seems to have stage III CKD    Looking at blood from the past it is stable over period of time   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 is hypertensive nephrosclerosis  Pathophysiology of kidney disease discussed with her and her daughter  Importance of hydration and avoiding nephrotoxic medicine also discussed with her  I will repeat blood test and urine test   Will also get kidney ultrasound     Patient is on ACE-inhibitor which I will continue  Patient had history of hyperkalemia so I discussed diet with her    Chronic kidney disease-mineral and bone disorder  Lab Results   Component Value Date    EGFR 36 12/14/2020    EGFR 34 09/25/2020    EGFR 32 09/17/2020    CREATININE 1 38 (H) 12/14/2020    CREATININE 1 45 (H) 09/25/2020    CREATININE 1 49 (H) 09/17/2020    PTH and phosphorus along with vitamin-D also will be checked as part of the CKD management    Benign hypertension   Blood pressure is very well controlled with ACE-inhibitor which I will continue    Dementia without behavioral disturbance   Quite stable and being monitored by neurologist     everything discussed at length with the patient and her daughter  I will see her back in 4 months  She will repeat blood and urine test before that visit  She will also get kidney ultrasound  Thank you very much for the consultation I will monitor the patient with you          Portions of the record may have been created with voice recognition software  Occasional wrong word or "sound a like" substitutions may have occurred due to the inherent limitations of voice recognition software  Read the chart carefully and recognize, using context, where substitutions have occurred  If you have any questions, please contact the dictating provider

## 2021-01-21 DIAGNOSIS — F34.1 DYSTHYMIA: ICD-10-CM

## 2021-01-21 DIAGNOSIS — I10 BENIGN HYPERTENSION: ICD-10-CM

## 2021-01-21 DIAGNOSIS — R63.4 WEIGHT LOSS: ICD-10-CM

## 2021-01-21 RX ORDER — MIRTAZAPINE 15 MG/1
TABLET, FILM COATED ORAL
Qty: 30 TABLET | Refills: 5 | Status: SHIPPED | OUTPATIENT
Start: 2021-01-21 | End: 2021-08-27

## 2021-01-21 RX ORDER — LISINOPRIL 5 MG/1
TABLET ORAL
Qty: 90 TABLET | Refills: 0 | Status: SHIPPED | OUTPATIENT
Start: 2021-01-21 | End: 2021-04-22

## 2021-01-31 DIAGNOSIS — R11.2 NAUSEA & VOMITING: ICD-10-CM

## 2021-02-03 RX ORDER — PANTOPRAZOLE SODIUM 40 MG/1
TABLET, DELAYED RELEASE ORAL
Qty: 90 TABLET | Refills: 1 | Status: SHIPPED | OUTPATIENT
Start: 2021-02-03 | End: 2021-05-03

## 2021-02-04 DIAGNOSIS — G30.1 LATE ONSET ALZHEIMER'S DISEASE WITHOUT BEHAVIORAL DISTURBANCE (HCC): ICD-10-CM

## 2021-02-04 DIAGNOSIS — F02.80 LATE ONSET ALZHEIMER'S DISEASE WITHOUT BEHAVIORAL DISTURBANCE (HCC): ICD-10-CM

## 2021-02-04 RX ORDER — MEMANTINE HYDROCHLORIDE 10 MG/1
10 TABLET ORAL 2 TIMES DAILY
Qty: 180 TABLET | Refills: 1 | Status: SHIPPED | OUTPATIENT
Start: 2021-02-04 | End: 2021-04-20 | Stop reason: SDUPTHER

## 2021-02-28 ENCOUNTER — APPOINTMENT (OUTPATIENT)
Dept: RADIOLOGY | Facility: CLINIC | Age: 81
End: 2021-02-28
Payer: COMMERCIAL

## 2021-02-28 ENCOUNTER — OFFICE VISIT (OUTPATIENT)
Dept: URGENT CARE | Facility: CLINIC | Age: 81
End: 2021-02-28
Payer: COMMERCIAL

## 2021-02-28 VITALS
BODY MASS INDEX: 21.35 KG/M2 | HEIGHT: 62 IN | RESPIRATION RATE: 18 BRPM | WEIGHT: 116 LBS | HEART RATE: 79 BPM | OXYGEN SATURATION: 98 % | TEMPERATURE: 97.5 F

## 2021-02-28 DIAGNOSIS — M54.50 ACUTE LEFT-SIDED LOW BACK PAIN WITHOUT SCIATICA: Primary | ICD-10-CM

## 2021-02-28 DIAGNOSIS — M54.50 ACUTE LEFT-SIDED LOW BACK PAIN WITHOUT SCIATICA: ICD-10-CM

## 2021-02-28 PROCEDURE — 99203 OFFICE O/P NEW LOW 30 MIN: CPT | Performed by: PHYSICIAN ASSISTANT

## 2021-02-28 PROCEDURE — 72100 X-RAY EXAM L-S SPINE 2/3 VWS: CPT

## 2021-02-28 PROCEDURE — S9088 SERVICES PROVIDED IN URGENT: HCPCS | Performed by: PHYSICIAN ASSISTANT

## 2021-02-28 RX ORDER — TIZANIDINE HYDROCHLORIDE 2 MG/1
2 CAPSULE, GELATIN COATED ORAL 2 TIMES DAILY
Qty: 8 CAPSULE | Refills: 0 | Status: SHIPPED | OUTPATIENT
Start: 2021-02-28 | End: 2021-03-09 | Stop reason: ALTCHOICE

## 2021-02-28 NOTE — PROGRESS NOTES
[address]        NAME: Arian Harvey is a 78 y o  female  : 1940    MRN: [id number]  DATE: 2021  TIME: 11:53 AM    Assessment and Plan   Acute left-sided low back pain without sciatica [M54 5]  1  Acute left-sided low back pain without sciatica  XR spine lumbar 2 or 3 views injury    TiZANidine (Zanaflex) 2 MG capsule    Ambulatory referral to Physical Therapy     Pt to begin PT and take a low dose muscle relaxer  Patient Instructions   Patient Instructions   Tylenol has not benefit for improving low back pain but may be used as an added pain relief since you are not able to take ADVIL  I am also prescribing you a muscle relaxer  Use that as needed for the pain but do not drive on this medication  I recommend staying active and using heat for 20 minutes every 3-4 hours  What are the parts of the back? -- The back is made up of   ? Vertebrae - A stack of bones that sit on top of one another like a stack of coins  Each of these bones has a hole in the center  When stacked, the bones form a hollow tube that protects the spinal cord  ? Discs - Rubbery discs sit in between each of the vertebrae to add cushion and allow movement  ? Spinal cord and nerves - The spinal cord is the highway of nerves that connects the brain to the rest of the body  It runs through the vertebrae within the spinal canal  Nerves branch from the spinal cord and pass in between the vertebrae  From there they connect to the arms, the legs, and the organs  (This is why problems in the back can cause leg pain or bladder or bowel problems )  ? Muscles, tendons, and ligaments - Together the muscles, tendons, and ligaments are called the "soft tissues" of the back  These soft tissues support the back and help hold it together  What causes low back pain? -- Many different things can cause low back pain  Most of the time, doctors do not know the exact cause  Back pain can happen if you strain a muscle  This is often what has happened when a person "throws out" their back  This refers to pain that starts suddenly after physical activity, like lifting something heavy or bending the back  Back pain can also happen if you have:  ?Damaged, bulging, or torn discs  ? Arthritis affecting the joints of the spine  ? Bony growths on the vertebrae that crowd nearby nerves  ? A vertebra out of place  ? Narrowing in the spinal canal    Should I get an imaging test? -- Most people do not need an imaging test such an X-ray, CT scan, or MRI  Most cases of back pain go away a few weeks  Doctors usually do not order imaging tests unless there are signs of something unusual   If your doctor does not order an imaging test, do not worry  They can still learn a lot about your pain just from looking you over and talking with you  How can the doctor or nurse tell what is wrong just by talking to me? -- Your symptoms tell your doctor or nurse a lot about the cause of your pain  For example:  ? If your pain started after you did something specific, like lifting a heavy object or twisting your back, you might have strained a muscle  ?If your pain spreads down the back of one thigh, it could be a sign that one of the nerves that go to your leg is being pinched by a bulging or torn disc  ?If your pain goes all the way down both legs, it could be a sign that you have a narrowed spinal canal  This is most often due to bony growths on your spine  How is back pain treated? -- Most people with an episode of low back pain do not have a serious medical problem, and can try simple treatments such as:  ?Staying active - The best thing you can do is to stay as active as possible  People with low back pain recover faster if they stay active  If your pain is severe, you might need to rest for a day or 2  But it's important to get back to walking and moving as soon as possible   While you should avoid heavy lifting and sports while your back hurts, try to keep doing your normal daily activities  Exercises you can try include walking, swimming, or using an exercise bike  Some people also find that [address] or yoga can help with their back pain  Finding activities you enjoy can help you stay active  ? Heat - Some people find that it helps to use a heating pad or heated wrap  Be careful to avoid high heat settings to prevent skin burns  ?Medicines - First, you can try pain medicines that you can get without a prescription  In many cases, doctors suggest first trying a nonsteroidal antiinflammatory drug, or "NSAID " NSAIDs include ibuprofen (sample brand names: Advil, Motrin) and naproxen (sample brand name: Aleve)  These might work better than acetaminophen (Tylenol) for back pain  If non-prescription medicines do not help, let your doctor or nurse know  In some cases, doctors prescribe a medicine to relax the muscles (called a "muscle relaxant")  But keep in mind that muscle relaxants are not generally used in people older than 65  In older people, these medicines can cause side effects such as trouble urinating or confusion  ? Reducing stress - Some people find that it helps to try something called "mindfulness-based stress reduction " This involves going to a group program to practice relaxation and meditation  If your back pain is making you feel anxious or depressed, talk to your doctor or nurse  There are other treatments that can help with these problems  Some people wonder if injections (shots) can help to relieve back pain  In some cases, doctors might recommend a shot of medicine to numb the area or reduce swelling  But this has only been proven to work in specific situations  Follow up with PCP in 3-5 days  Proceed to  ER if symptoms worsen  Chief Complaint     Chief Complaint   Patient presents with    Back Pain     L lateral back pain  lifted a bucket up and felt a pop/pull x 3 days ago           History of Present Illness       The pt is an 64-year-old female presenting with Left sided lateral back pain  She lifted up a bucket and felt a pop/pull in her back 3 days ago  The bucket was full of water  Now she is having pain when she gets out of bed  Denies numbness or paresthesias down the leg  Denies CP, abdominal pain, nausea vomiting  The pain is sharp and constant  Denies bladder/bowel symtoms  No saddle anesthesia  Review of Systems   Review of Systems   Constitutional: Negative for activity change, appetite change, chills, diaphoresis, fatigue and fever  Respiratory: Negative for cough, chest tightness and shortness of breath  Cardiovascular: Negative for chest pain and palpitations  Gastrointestinal: Negative for abdominal pain, constipation, diarrhea, nausea, rectal pain and vomiting           Current Medications       Current Outpatient Medications:     aspirin 81 MG tablet, Take 1 tablet (81 mg total) by mouth daily, Disp: , Rfl:     lisinopril (ZESTRIL) 5 mg tablet, TAKE 1 TABLET DAILY, Disp: 90 tablet, Rfl: 0    memantine (NAMENDA) 10 mg tablet, Take 1 tablet (10 mg total) by mouth 2 (two) times a day, Disp: 180 tablet, Rfl: 1    mirtazapine (REMERON) 15 mg tablet, TAKE ONE TABLET BY MOUTH EVERY DAY AT BEDTIME, Disp: 30 tablet, Rfl: 5    pantoprazole (PROTONIX) 40 mg tablet, TAKE ONE TABLET BY MOUTH EVERY DAY, Disp: 90 tablet, Rfl: 1    TiZANidine (Zanaflex) 2 MG capsule, Take 1 capsule (2 mg total) by mouth 2 (two) times a day for 4 days, Disp: 8 capsule, Rfl: 0    Zoster Vac Recomb Adjuvanted (Shingrix) 50 MCG/0 5ML SUSR, 0 5mL IM for one dose, followed by 0 5mL IM 2-6 months after first dose, Disp: 1 each, Rfl: 1    Current Allergies     Allergies as of 02/28/2021 - Reviewed 02/28/2021   Allergen Reaction Noted    Cefazolin  11/29/2016            The following portions of the patient's history were reviewed and updated as appropriate: allergies, current medications, past family history, past medical history, past social history, past surgical history and problem list      Past Medical History:   Diagnosis Date    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     Aortic aneurysm (HCC)     4 1 cm on December 6, 2018    Chronic kidney disease     CKD (chronic kidney disease) stage 2, GFR 60-89 ml/min     Colon polyps     Dementia ([address] )     Hyperlipidemia     Hypertension     Insomnia     Memory loss     Osteopenia with elevated frax score     Osteoporosis     Tubular adenoma of colon 04/2019    Uterine fibroid        Past Surgical History:   Procedure Laterality Date    COLONOSCOPY  4657    date not certain     FOOT SURGERY      FL COLONOSCOPY FLX DX W/COLLJ SPEC WHEN PFRMD N/A 4/24/2019    Procedure: COLONOSCOPY;  Surgeon: Deena Beavers MD;  Location: [facility]; Service: Gastroenterology    FL ESOPHAGOGASTRODUODENOSCOPY TRANSORAL DIAGNOSTIC N/A 4/24/2019    Procedure: ESOPHAGOGASTRODUODENOSCOPY (EGD); Surgeon: Deena Beavers MD;  Location: [facility]; Service: Gastroenterology       Family History   Problem Relation Age of Onset    Cancer Mother     Cancer Father          Medications have been verified  Objective   Pulse 79   Temp 97 5 °F (36 4 °C) (Temporal)   Resp 18   Ht 5' 1 5" (1 562 m)   Wt 52 6 kg (116 lb)   LMP  (LMP Unknown)   SpO2 98%   BMI 21 56 kg/m²        Physical Exam     Physical Exam  Vitals signs reviewed  Constitutional:       General: She is not in acute distress  Appearance: Normal appearance  She is normal weight  She is not ill-appearing, toxic-appearing or diaphoretic  HENT:      Head: Normocephalic and atraumatic  Cardiovascular:      Rate and Rhythm: Normal rate and regular rhythm  Heart sounds: Normal heart sounds  No murmur  No friction rub  No gallop  Pulmonary:      Effort: Pulmonary effort is normal  No respiratory distress  Breath sounds: Normal breath sounds  No stridor  No wheezing, rhonchi or rales     Chest: Chest wall: No tenderness  Abdominal:      General: Abdomen is flat  Bowel sounds are normal  There is no distension  Palpations: There is no mass  Tenderness: There is no abdominal tenderness  There is no right CVA tenderness, left CVA tenderness, guarding or rebound  Hernia: No hernia is present  Musculoskeletal: Normal range of motion  General: Tenderness (left sided LBP muscular  No bony tenderness ) present  No swelling, deformity or signs of injury  Skin:     General: Skin is warm and dry  Capillary Refill: Capillary refill takes less than 2 seconds  Neurological:      Mental Status: She is alert

## 2021-02-28 NOTE — PATIENT INSTRUCTIONS
Tylenol has not benefit for improving low back pain but may be used as an added pain relief since you are not able to take ADVIL  I am also prescribing you a muscle relaxer  Use that as needed for the pain but do not drive on this medication  I recommend staying active and using heat for 20 minutes every 3-4 hours  What are the parts of the back? -- The back is made up of   ? Vertebrae - A stack of bones that sit on top of one another like a stack of coins  Each of these bones has a hole in the center  When stacked, the bones form a hollow tube that protects the spinal cord  ? Discs - Rubbery discs sit in between each of the vertebrae to add cushion and allow movement  ? Spinal cord and nerves - The spinal cord is the highway of nerves that connects the brain to the rest of the body  It runs through the vertebrae within the spinal canal  Nerves branch from the spinal cord and pass in between the vertebrae  From there they connect to the arms, the legs, and the organs  (This is why problems in the back can cause leg pain or bladder or bowel problems )  ? Muscles, tendons, and ligaments - Together the muscles, tendons, and ligaments are called the "soft tissues" of the back  These soft tissues support the back and help hold it together  What causes low back pain? -- Many different things can cause low back pain  Most of the time, doctors do not know the exact cause  Back pain can happen if you strain a muscle  This is often what has happened when a person "throws out" their back  This refers to pain that starts suddenly after physical activity, like lifting something heavy or bending the back  Back pain can also happen if you have:  ?Damaged, bulging, or torn discs  ? Arthritis affecting the joints of the spine  ? Bony growths on the vertebrae that crowd nearby nerves  ? A vertebra out of place  ? Narrowing in the spinal canal    Should I get an imaging test? -- Most people do not need an imaging test such an X-ray, CT scan, or MRI  Most cases of back pain go away a few weeks  Doctors usually do not order imaging tests unless there are signs of something unusual   If your doctor does not order an imaging test, do not worry  They can still learn a lot about your pain just from looking you over and talking with you  How can the doctor or nurse tell what is wrong just by talking to me? -- Your symptoms tell your doctor or nurse a lot about the cause of your pain  For example:  ? If your pain started after you did something specific, like lifting a heavy object or twisting your back, you might have strained a muscle  ?If your pain spreads down the back of one thigh, it could be a sign that one of the nerves that go to your leg is being pinched by a bulging or torn disc  ?If your pain goes all the way down both legs, it could be a sign that you have a narrowed spinal canal  This is most often due to bony growths on your spine  How is back pain treated? -- Most people with an episode of low back pain do not have a serious medical problem, and can try simple treatments such as:  ?Staying active - The best thing you can do is to stay as active as possible  People with low back pain recover faster if they stay active  If your pain is severe, you might need to rest for a day or 2  But it's important to get back to walking and moving as soon as possible  While you should avoid heavy lifting and sports while your back hurts, try to keep doing your normal daily activities  Exercises you can try include walking, swimming, or using an exercise bike  Some people also find that [address] or yoga can help with their back pain  Finding activities you enjoy can help you stay active  ? Heat - Some people find that it helps to use a heating pad or heated wrap  Be careful to avoid high heat settings to prevent skin burns  ?Medicines - First, you can try pain medicines that you can get without a prescription   In many cases, doctors suggest first trying a nonsteroidal antiinflammatory drug, or "NSAID " NSAIDs include ibuprofen (sample brand names: Advil, Motrin) and naproxen (sample brand name: Aleve)  These might work better than acetaminophen (Tylenol) for back pain  If non-prescription medicines do not help, let your doctor or nurse know  In some cases, doctors prescribe a medicine to relax the muscles (called a "muscle relaxant")  But keep in mind that muscle relaxants are not generally used in people older than 65  In older people, these medicines can cause side effects such as trouble urinating or confusion  ? Reducing stress - Some people find that it helps to try something called "mindfulness-based stress reduction " This involves going to a group program to practice relaxation and meditation  If your back pain is making you feel anxious or depressed, talk to your doctor or nurse  There are other treatments that can help with these problems  Some people wonder if injections (shots) can help to relieve back pain  In some cases, doctors might recommend a shot of medicine to numb the area or reduce swelling  But this has only been proven to work in specific situations

## 2021-03-04 ENCOUNTER — HOSPITAL ENCOUNTER (EMERGENCY)
Facility: HOSPITAL | Age: 81
Discharge: HOME | End: 2021-03-04
Attending: EMERGENCY MEDICINE | Admitting: EMERGENCY MEDICINE
Payer: COMMERCIAL

## 2021-03-04 ENCOUNTER — TELEPHONE (OUTPATIENT)
Dept: OTHER | Facility: OTHER | Age: 81
End: 2021-03-04

## 2021-03-04 VITALS
RESPIRATION RATE: 22 BRPM | OXYGEN SATURATION: 97 % | DIASTOLIC BLOOD PRESSURE: 81 MMHG | TEMPERATURE: 96.9 F | HEART RATE: 77 BPM | SYSTOLIC BLOOD PRESSURE: 176 MMHG

## 2021-03-04 DIAGNOSIS — F03.90 DEMENTIA (HCC): Primary | ICD-10-CM

## 2021-03-04 DIAGNOSIS — R10.31 RIGHT GROIN PAIN: ICD-10-CM

## 2021-03-04 LAB
ALBUMIN SERPL BCP-MCNC: 4 G/DL (ref 3.5–5)
ALP SERPL-CCNC: 91 U/L (ref 46–116)
ALT SERPL W P-5'-P-CCNC: 26 U/L (ref 12–78)
ANION GAP SERPL CALCULATED.3IONS-SCNC: 12 MMOL/L (ref 4–13)
AST SERPL W P-5'-P-CCNC: 26 U/L (ref 5–45)
BASOPHILS # BLD AUTO: 0.13 THOUSANDS/ΜL (ref 0–0.1)
BASOPHILS NFR BLD AUTO: 2 % (ref 0–1)
BILIRUB DIRECT SERPL-MCNC: 0.13 MG/DL (ref 0–0.2)
BILIRUB SERPL-MCNC: 0.6 MG/DL (ref 0.2–1)
BUN SERPL-MCNC: 23 MG/DL (ref 5–25)
CALCIUM SERPL-MCNC: 9.7 MG/DL (ref 8.3–10.1)
CHLORIDE SERPL-SCNC: 107 MMOL/L (ref 100–108)
CO2 SERPL-SCNC: 24 MMOL/L (ref 21–32)
CREAT SERPL-MCNC: 1.37 MG/DL (ref 0.6–1.3)
EOSINOPHIL # BLD AUTO: 0.21 THOUSAND/ΜL (ref 0–0.61)
EOSINOPHIL NFR BLD AUTO: 3 % (ref 0–6)
ERYTHROCYTE [DISTWIDTH] IN BLOOD BY AUTOMATED COUNT: 13.3 % (ref 11.6–15.1)
FLUAV RNA RESP QL NAA+PROBE: NEGATIVE
FLUBV RNA RESP QL NAA+PROBE: NEGATIVE
GFR SERPL CREATININE-BSD FRML MDRD: 36 ML/MIN/1.73SQ M
GLUCOSE SERPL-MCNC: 92 MG/DL (ref 65–140)
HCT VFR BLD AUTO: 38.4 % (ref 34.8–46.1)
HGB BLD-MCNC: 12.1 G/DL (ref 11.5–15.4)
IMM GRANULOCYTES # BLD AUTO: 0.04 THOUSAND/UL (ref 0–0.2)
IMM GRANULOCYTES NFR BLD AUTO: 1 % (ref 0–2)
LYMPHOCYTES # BLD AUTO: 2.26 THOUSANDS/ΜL (ref 0.6–4.47)
LYMPHOCYTES NFR BLD AUTO: 28 % (ref 14–44)
MCH RBC QN AUTO: 31.2 PG (ref 26.8–34.3)
MCHC RBC AUTO-ENTMCNC: 31.5 G/DL (ref 31.4–37.4)
MCV RBC AUTO: 99 FL (ref 82–98)
MONOCYTES # BLD AUTO: 0.61 THOUSAND/ΜL (ref 0.17–1.22)
MONOCYTES NFR BLD AUTO: 8 % (ref 4–12)
NEUTROPHILS # BLD AUTO: 4.82 THOUSANDS/ΜL (ref 1.85–7.62)
NEUTS SEG NFR BLD AUTO: 58 % (ref 43–75)
NRBC BLD AUTO-RTO: 0 /100 WBCS
PLATELET # BLD AUTO: 373 THOUSANDS/UL (ref 149–390)
PMV BLD AUTO: 9.2 FL (ref 8.9–12.7)
POTASSIUM SERPL-SCNC: 3.9 MMOL/L (ref 3.5–5.3)
PROT SERPL-MCNC: 7.9 G/DL (ref 6.4–8.2)
RBC # BLD AUTO: 3.88 MILLION/UL (ref 3.81–5.12)
RSV RNA RESP QL NAA+PROBE: NEGATIVE
SARS-COV-2 RNA RESP QL NAA+PROBE: NEGATIVE
SODIUM SERPL-SCNC: 143 MMOL/L (ref 136–145)
WBC # BLD AUTO: 8.07 THOUSAND/UL (ref 4.31–10.16)

## 2021-03-04 PROCEDURE — 80048 BASIC METABOLIC PNL TOTAL CA: CPT | Performed by: EMERGENCY MEDICINE

## 2021-03-04 PROCEDURE — 99284 EMERGENCY DEPT VISIT MOD MDM: CPT | Performed by: EMERGENCY MEDICINE

## 2021-03-04 PROCEDURE — 0241U HB NFCT DS VIR RESP RNA 4 TRGT: CPT | Performed by: EMERGENCY MEDICINE

## 2021-03-04 PROCEDURE — 99283 EMERGENCY DEPT VISIT LOW MDM: CPT

## 2021-03-04 PROCEDURE — 97163 PT EVAL HIGH COMPLEX 45 MIN: CPT

## 2021-03-04 PROCEDURE — 85025 COMPLETE CBC W/AUTO DIFF WBC: CPT | Performed by: EMERGENCY MEDICINE

## 2021-03-04 PROCEDURE — 80076 HEPATIC FUNCTION PANEL: CPT | Performed by: EMERGENCY MEDICINE

## 2021-03-04 PROCEDURE — 36415 COLL VENOUS BLD VENIPUNCTURE: CPT | Performed by: EMERGENCY MEDICINE

## 2021-03-04 RX ORDER — MIRTAZAPINE 15 MG/1
15 TABLET, FILM COATED ORAL ONCE
Status: COMPLETED | OUTPATIENT
Start: 2021-03-04 | End: 2021-03-04

## 2021-03-04 RX ORDER — MEMANTINE HYDROCHLORIDE 10 MG/1
10 TABLET ORAL ONCE
Status: COMPLETED | OUTPATIENT
Start: 2021-03-04 | End: 2021-03-04

## 2021-03-04 RX ADMIN — MEMANTINE 10 MG: 10 TABLET ORAL at 17:34

## 2021-03-04 RX ADMIN — MIRTAZAPINE 15 MG: 15 TABLET, FILM COATED ORAL at 17:34

## 2021-03-04 NOTE — CASE MANAGEMENT
CM received call from [facility]  stating that pt approved for [facility]  Notified Dr Christopher Alas via telephone call  [facility] charge ER RN number to make arrangments for transfer  CM CALLED cam/TRISTON Montelongo Manual, [phone] to make her aware that pt being d c to [facility] today  Mario answered  Julien verdugo w/ d/c to [facility]  CM spoke to [facility] charge RN and gave approval for d/c to [facility]  Notified [facility] that all is completed for d/c to [facility] today

## 2021-03-04 NOTE — ED NOTES
Called [facility] to set up transport to [address] [place] [address] in [place]   Waiting for callback with time of pickup     Lauri Hebert RN  03/04/21 [id number]

## 2021-03-04 NOTE — ED PROVIDER NOTES
History  Chief Complaint   Patient presents with    Hip Injury     reports previous fall, right hip pain, poor historian, pain with walking "I think I broke my hip a while back"     HPI patient is an 49-year-old female, recently at [facility] urgent care and had x-rays for back pain that radiated around towards her right hip, x-rays were negative other than old compression fractures  Patient was seen at [facility] today for persistent pain primarily right hip pain  Patient reports she was putting a bucket under oblique in her chin knee and felt a pop in her right hip  X-rays there were apparently read as negative  Patient was referred here because she had difficulty ambulating and was told that she had to be admitted to the hospital due to the difficulty ambulating  Family reports that the patient has no local support and that she is confused and they were concerned because of her dementia  They report that she is unsafe to stay at home alone and they refer feel she requires admission  Patient is asymptomatic at rest she has pain when she stands primarily in her right groin  She has pain when ambulating but can ambulate with a walker  X-ray report done of the right hip at [facility] today are visible in the medical record and they show negative x-rays  Past medical history, kidney disease, dementia hypertension  Family history noncontributory  Social history, family reports demented can no longer live alone, nonsmoker    Prior to Admission Medications   Prescriptions Last Dose Informant Patient Reported? Taking?    TiZANidine (Zanaflex) 2 MG capsule   No Yes   Sig: Take 1 capsule (2 mg total) by mouth 2 (two) times a day for 4 days   Zoster Vac Recomb Adjuvanted (Shingrix) 50 MCG/0 5ML SUSR  Self No No   Si 5mL IM for one dose, followed by 0 5mL IM 2-6 months after first dose   aspirin 81 MG tablet  Self No Yes   Sig: Take 1 tablet (81 mg total) by mouth daily   lisinopril (ZESTRIL) 5 mg tablet   No Yes   Sig: TAKE 1 TABLET DAILY   memantine (NAMENDA) 10 mg tablet   No Yes   Sig: Take 1 tablet (10 mg total) by mouth 2 (two) times a day   mirtazapine (REMERON) 15 mg tablet   No Yes   Sig: TAKE ONE TABLET BY MOUTH EVERY DAY AT BEDTIME   pantoprazole (PROTONIX) 40 mg tablet   No Yes   Sig: TAKE ONE TABLET BY MOUTH EVERY DAY      Facility-Administered Medications: None       Past Medical History:   Diagnosis Date    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     Aortic aneurysm (HCC)     4 1 cm on December 6, 2018    Chronic kidney disease     CKD (chronic kidney disease) stage 2, GFR 60-89 ml/min     Colon polyps     Dementia (HCC)     Hyperlipidemia     Hypertension     Insomnia     Memory loss     Osteopenia with elevated frax score     Osteoporosis     Tubular adenoma of colon 04/2019    Uterine fibroid        Past Surgical History:   Procedure Laterality Date    COLONOSCOPY  9890    date not certain     FOOT SURGERY      SD COLONOSCOPY FLX DX W/COLLJ SPEC WHEN PFRMD N/A 4/24/2019    Procedure: COLONOSCOPY;  Surgeon: Bonnie Mcallister MD;  Location: [facility]; Service: Gastroenterology    SD ESOPHAGOGASTRODUODENOSCOPY TRANSORAL DIAGNOSTIC N/A 4/24/2019    Procedure: ESOPHAGOGASTRODUODENOSCOPY (EGD); Surgeon: Bonnie Mcallister MD;  Location: [facility]; Service: Gastroenterology       Family History   Problem Relation Age of Onset    Cancer Mother     Cancer Father      I have reviewed and agree with the history as documented  E-Cigarette/Vaping     E-Cigarette/Vaping Substances     Social History     Tobacco Use    Smoking status: Never Smoker    Smokeless tobacco: Never Used   Substance Use Topics    Alcohol use: Yes     Alcohol/week: 7 0 standard drinks     Types: 7 Glasses of wine per week     Frequency: Monthly or less     Comment: socially     Drug use: No       Review of Systems   Constitutional: Negative for diaphoresis, fatigue and fever     HENT: Negative for congestion, ear pain, nosebleeds and sore throat  Eyes: Negative for photophobia, pain, discharge and visual disturbance  Respiratory: Negative for cough, choking, chest tightness, shortness of breath and wheezing  Cardiovascular: Negative for chest pain and palpitations  Gastrointestinal: Negative for abdominal distention, abdominal pain, diarrhea and vomiting  Genitourinary: Negative for dysuria, flank pain and frequency  Musculoskeletal: Negative for back pain, gait problem and joint swelling  Skin: Negative for color change and rash  Neurological: Negative for dizziness, syncope and headaches  Psychiatric/Behavioral: Negative for behavioral problems and confusion  The patient is not nervous/anxious  All other systems reviewed and are negative  Reports right groin pain    Physical Exam  Physical Exam  Vitals signs and nursing note reviewed  Constitutional:       Appearance: She is well-developed  HENT:      Head: Normocephalic  Right Ear: External ear normal       Left Ear: External ear normal       Nose: Nose normal    Eyes:      General: Lids are normal       Pupils: Pupils are equal, round, and reactive to light  Neck:      Musculoskeletal: Normal range of motion and neck supple  Cardiovascular:      Rate and Rhythm: Normal rate and regular rhythm  Pulmonary:      Effort: Pulmonary effort is normal  No respiratory distress  Breath sounds: Normal breath sounds  Abdominal:      General: Abdomen is flat  Bowel sounds are normal    Musculoskeletal: Normal range of motion  General: No deformity  Comments: There is mild tenderness in the right groin, full range of motion of the right hip, I can not rule the right hip in the socket without pain, pain is exacerbated by straight leg raise, distal neurovascular tendon intact   Skin:     General: Skin is warm and dry  Neurological:      General: No focal deficit present        Mental Status: She is alert       Cranial Nerves: No cranial nerve deficit        Comments: Oriented to person and place but not time       Pulse oximetry normal at 98% adequate oxygenation, not hypoxic  Vital Signs  ED Triage Vitals [03/04/21 1127]   Temperature Pulse Respirations Blood Pressure SpO2   (!) 96 9 °F (36 1 °C) 71 18 155/84 98 %      Temp Source Heart Rate Source Patient Position - Orthostatic VS BP Location FiO2 (%)   Temporal Monitor Sitting Left arm --      Pain Score       --           Vitals:    03/04/21 1127 03/04/21 1400 03/04/21 1530 03/04/21 1630   BP: 155/84 (!) 173/74 132/71 (!) 176/81   Pulse: 71 68 74 77   Patient Position - Orthostatic VS: Sitting Sitting Lying Lying         Visual Acuity      ED Medications  Medications   mirtazapine (REMERON) tablet 15 mg (15 mg Oral Given 3/4/21 1734)   memantine (NAMENDA) tablet 10 mg (10 mg Oral Given 3/4/21 1734)       Diagnostic Studies  Results Reviewed     Procedure Component Value Units Date/Time    Hepatic function panel [[id number]]  (Normal) Collected: 03/04/21 1526    Lab Status: Final result Specimen: Blood from Arm, Left Updated: 03/04/21 1620     Total Bilirubin 0 60 mg/dL      Bilirubin, Direct 0 13 mg/dL      Alkaline Phosphatase 91 U/L      AST 26 U/L      ALT 26 U/L      Total Protein 7 9 g/dL      Albumin 4 0 g/dL     Basic metabolic panel [[id number]]  (Abnormal) Collected: 03/04/21 1526    Lab Status: Final result Specimen: Blood from Arm, Left Updated: 03/04/21 1557     Sodium 143 mmol/L      Potassium 3 9 mmol/L      Chloride 107 mmol/L      CO2 24 mmol/L      ANION GAP 12 mmol/L      BUN 23 mg/dL      Creatinine 1 37 mg/dL      Glucose 92 mg/dL      Calcium 9 7 mg/dL      eGFR 36 ml/min/1 73sq m     Narrative:      Sonia guidelines for Chronic Kidney Disease (CKD):     Stage 1 with normal or high GFR (GFR > 90 mL/min/1 73 square meters)    Stage 2 Mild CKD (GFR = 60-89 mL/min/1 73 square meters)    Stage 3A Moderate CKD (GFR = 45-59 mL/min/1 73 square meters)    Stage 3B Moderate CKD (GFR = 30-44 mL/min/1 73 square meters)    Stage 4 Severe CKD (GFR = 15-29 mL/min/1 73 square meters)    Stage 5 End Stage CKD (GFR <15 mL/min/1 73 square meters)  Note: GFR calculation is accurate only with a steady state creatinine    CBC and differential [[id number]]  (Abnormal) Collected: 03/04/21 1526    Lab Status: Final result Specimen: Blood from Arm, Left Updated: 03/04/21 1542     WBC 8 07 Thousand/uL      RBC 3 88 Million/uL      Hemoglobin 12 1 g/dL      Hematocrit 38 4 %      MCV 99 fL      MCH 31 2 pg      MCHC 31 5 g/dL      RDW 13 3 %      MPV 9 2 fL      Platelets 997 Thousands/uL      nRBC 0 /100 WBCs      Neutrophils Relative 58 %      Immat GRANS % 1 %      Lymphocytes Relative 28 %      Monocytes Relative 8 %      Eosinophils Relative 3 %      Basophils Relative 2 %      Neutrophils Absolute 4 82 Thousands/µL      Immature Grans Absolute 0 04 Thousand/uL      Lymphocytes Absolute 2 26 Thousands/µL      Monocytes Absolute 0 61 Thousand/µL      Eosinophils Absolute 0 21 Thousand/µL      Basophils Absolute 0 13 Thousands/µL     COVID19, Influenza A/B, RSV PCR, [facility] [[id number]]  (Normal) Collected: 03/04/21 1439    Lab Status: Final result Specimen: Nares from Nasopharyngeal Swab Updated: 03/04/21 1536     SARS-CoV-2 Negative     INFLUENZA A PCR Negative     INFLUENZA B PCR Negative     RSV PCR Negative    Narrative: This test has been authorized by FDA under an EUA (Emergency Use Assay) for use by authorized laboratories  Clinical caution and judgement should be used with the interpretation of these results with consideration of the clinical impression and other laboratory testing  Testing reported as "Positive" or "Negative" has been proven to be accurate according to standard laboratory validation requirements  All testing is performed with control materials showing appropriate reactivity at standard intervals  No orders to display              Procedures  ECG 12 Lead Documentation Only    Date/Time: 3/4/2021 5:51 PM  Performed by: Anuj Alford MD  Authorized by: Anuj Alford MD     Indications / Diagnosis:  Medical clearance  ECG reviewed by me, the ED Provider: yes    Patient location:  ED  Previous ECG:     Previous ECG:  Compared to current    Comparison ECG info:  April 1, 2019    Similarity:  No change  Interpretation:     Interpretation: non-specific    Rate:     ECG rate:  Seventy-three    ECG rate assessment: normal    Rhythm:     Rhythm: sinus rhythm    Comments:      Normal sinus rhythm no acute ST-T wave changes no change from prior EKG  ED Course               Identification of Seniors at Risk      Most Recent Value   (ISAR) Identification of Seniors at Risk   Before the illness or injury that brought you to the Emergency, did you need someone to help you on a regular basis? 0 Filed at: 03/04/2021 1129   In the last 24 hours, have you needed more help than usual?  0 Filed at: 03/04/2021 1129   Have you been hospitalized for one or more nights during the past 6 months? 0 Filed at: 03/04/2021 1129   In general, do you see well?  0 Filed at: 03/04/2021 1129   In general, do you have serious problems with your memory? 1 Filed at: 03/04/2021 1129   Do you take more than three different medications every day? 1 Filed at: 03/04/2021 1129   ISAR Score  2 Filed at: 03/04/2021 1129                    SBIRT 22yo+      Most Recent Value   SBIRT (23 yo +)   In order to provide better care to our patients, we are screening all of our patients for alcohol and drug use  Would it be okay to ask you these screening questions? Yes Filed at: 03/04/2021 1347   Initial Alcohol Screen: US AUDIT-C    1  How often do you have a drink containing alcohol?  0 Filed at: 03/04/2021 1347   2  How many drinks containing alcohol do you have on a typical day you are drinking? 0 Filed at: 03/04/2021 1347   3a   Male UNDER 65: How often do you have five or more drinks on one occasion? 0 Filed at: 03/04/2021 1347   3b  FEMALE Any Age, or MALE 65+: How often do you have 4 or more drinks on one occassion? 0 Filed at: 03/04/2021 1347   Audit-C Score  0 Filed at: 03/04/2021 1347   WALLACE: How many times in the past year have you    Used an illegal drug or used a prescription medication for non-medical reasons? Never Filed at: 03/04/2021 1347           seen with case management, they evaluated the patient and feel the patient needs rehab or long-term placement  Patient is medically stable  They asked me to have the patient seen by Physical therapy  Physical therapy report the patient was stable to ambulate with a walker but confused and will require placement due to her confusion  Case management reports patient is accepted at [address] [place] [address] awaiting insurance authorization  Laboratory testing is normal, normal CBC within normal white count and hemoglobin  Electrolytes were normal other than elevated serum creatinine 1 37, consistent with patient's age renal insufficiency  Patient is medically stable  MDM medical decision making 41-year-old female presents emergency department, complaining of right low back and right hip pain, x-rays of the low back showed a old L1 compression fracture at outpatient urgent care last week, x-rays today of the right hip showed no fracture  Patient is able to ambulate  Family reports patient unable to care for herself now due to dementia and there is no local family  Family was requesting hospitalization because of the patient's dementia  Patient was referred from urgent care due to her dementia and requesting placement  Spoke with case management requested COVID testing, PT consult  PT reports the patient is stable ambulatory basis but has baseline confusion requiring rehab we were able to get the patient a bed at [facility]    Case management reports accepted at [facility]    Disposition  Final diagnoses:   Dementia ([facility] [address] )   Right groin pain     Time reflects when diagnosis was documented in both MDM as applicable and the Disposition within this note     Time User Action Codes Description Comment    3/4/2021  3:17 PM Nilay Ruano Add [F03 90] Dementia ([facility] [address] )     3/4/2021  3:17 PM Nilay Ruano Add [R10 31] Right groin pain       ED Disposition     ED Disposition Condition Date/Time Comment    Discharge Stable Thu Mar 4, 2021  4:55 PM Yamila Estrada discharge to home/self care  Follow-up Information     Follow up With Specialties Details Why Balaji, [address], Nurse Practitioner   [place] [phone]            Patient's Medications   Discharge Prescriptions    No medications on file     No discharge procedures on file      PDMP Review     None          ED Provider  Electronically Signed by           Bandar Jackson MD  03/04/21 1737       Bandar Jackson MD  03/04/21 9937

## 2021-03-04 NOTE — PLAN OF CARE
Problem: PHYSICAL THERAPY ADULT  Goal: Performs mobility at highest level of function for planned discharge setting  See evaluation for individualized goals  Description: Treatment/Interventions: Functional transfer training, LE strengthening/ROM, Elevations, Therapeutic exercise, Endurance training, Patient/family training, Equipment eval/education, Bed mobility, Gait training, Spoke to nursing, Spoke to case management, Spoke to MD, Family          See flowsheet documentation for full assessment, interventions and recommendations  Note: Prognosis: Good  Problem List: Decreased strength, Decreased endurance, Decreased mobility, Impaired balance, Pain, Decreased cognition  Assessment: Pt is 78 y o  female seen for PT evaluation s/p admit to [facility] on 3/4/2021 w/ Hip Injury  PT consulted to assess pt's functional mobility and d/c needs  Order placed for PT eval and tx, w/ ambulate patient order  Performed at least 2 patient identifiers during session: Name and   Comorbidities affecting pt's physical performance at time of assessment include: osteoporosis, chronic kidney disease-mineral and bone disorder, dementia without behavioral disturbance, benign hypertension  PTA, pt was independent w/ all functional mobility w/ no AD, ambulates community distances and elevations, has 2+4 BARRON, lives alone in bi-level house and retired  Personal factors affecting pt at time of IE include: inaccessible home environment, ambulating w/ assistive device, stairs to enter home, inability to navigate community distances, inability to navigate level surfaces w/o external assistance, unable to perform dynamic tasks in community, limited home support, inability to perform IADLs and inability to perform ADLs   Please find objective findings from PT assessment regarding body systems outlined above with impairments and limitations including weakness, impaired balance, decreased endurance, gait deviations, pain, decreased activity tolerance, decreased functional mobility tolerance, fall risk and decreased cognition  The following objective measures performed on IE also reveal limitations: Barthel Index: 55/100 and Modified Travis: 4 (moderate/severe disability)  Pt's clinical presentation is currently unstable/unpredictable seen in pt's presentation of ongoing medical management/monitoring, evident in need for assist w/ all phases of mobility when usually mobilizing independently, and need for input for mobility technique/safety  Pt to benefit from continued PT tx to address deficits as defined above and maximize level of functional independent mobility and consistency  From PT/mobility standpoint, recommendation at time of d/c would be STR pending progress in order to facilitate return to PLOF  Barriers to Discharge: Decreased caregiver support     PT Discharge Recommendation: Post-Acute Rehabilitation Services     PT - OK to Discharge: Yes(when medically cleared; if to STR)    See flowsheet documentation for full assessment

## 2021-03-04 NOTE — CASE MANAGEMENT
CM received call from [facility]  Reviewing await admission determinations  Needs PT notes  CM SAW PT ENTERING ROOM WHEN LEAVING

## 2021-03-04 NOTE — CASE MANAGEMENT
CM called for ED consult to see pt as per Dr Elle Rivera  CM met in ER room 8 w/ pt & stepdaughter/POA Sharon Bolton, [phone] stepson/POA Abhi Lepe [phone] was on the cell phone,   Pt and family reports that pt lives alone in bilevel w/ 4 steps to enter  Pt lives on upper level only  Pot is independent w/ ADLS & Ambulation without device, sometimes uses cane for long distances  ? If Joanne Gotti in house  Pt uses [facility] for pharmacy needs and has no issues w/ copays or purchasing medications  PCP Dr Tirso Briggs, Pt does not dirve and is retired  Pt has nener had short term rehab, psych or D & A Admission history  Pt used [facility] for Home health aides  private hire  No VNA  Pt and family agree to [place] [address] [place] for STR no preference at this time  Will see who approves and give names to family for gfinal choice  PT to see pt for recommendations  Discussed w/ Dr Elle Rivera  CM Dept will follow through fd/c     A post acute care recommendation was made by your care team for STR  Discussed Freedom of Choice with both patient and POA  List of facilities given to both patient and POA via in person  both patient and POA aware the list is custom filtered for them by preference  and that [facility] post acute providers are designated  CM reviewed d/c planning process including the following: identifying help at home, patient preference for d/c planning needs, Discharge Lounge, [facility] Meds to Bed program, availability of treatment team to discuss questions or concerns patient and/or family may have regarding understanding medications and recognizing signs and symptoms once discharged  CM also encouraged patient to follow up with all recommended appointments after discharge  Patient advised of importance for patient and family to participate in managing patients medical well being

## 2021-03-04 NOTE — CASE MANAGEMENT
CM CALLED stepdaughter/POA Megha Juanito, [phone] AND INFORMED OF [facility] APPROVAL FOR STR  AWAIT INSURANCE AUTHORIZATION  Kiki Preston agrees w/ [facility]  Admission

## 2021-03-04 NOTE — PHYSICAL THERAPY NOTE
Physical Therapy Evaluation     Patient's Name: Elida Rob    Admitting Diagnosis  Hip pain [M25 [id number]]    Problem List  Patient Active Problem List   Diagnosis    Benign hypertension    Dementia without behavioral disturbance (HCC)    Dizziness    Nodule of left lung    Ascending aortic aneurysm (HCC)    Stage 3b chronic kidney disease    Tubular adenoma    Pituitary mass ([address] )    Dysthymia    Wheeze    Heart murmur    Aortic valve sclerosis    Chronic kidney disease-mineral and bone disorder    Osteoporosis       Past Medical History  Past Medical History:   Diagnosis Date    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     Aortic aneurysm (HCC)     4 1 cm on December 6, 2018    Chronic kidney disease     CKD (chronic kidney disease) stage 2, GFR 60-89 ml/min     Colon polyps     Dementia ([address] )     Hyperlipidemia     Hypertension     Insomnia     Memory loss     Osteopenia with elevated frax score     Osteoporosis     Tubular adenoma of colon 04/2019    Uterine fibroid        Past Surgical History  Past Surgical History:   Procedure Laterality Date    COLONOSCOPY  9855    date not certain     FOOT SURGERY      ND COLONOSCOPY FLX DX W/COLLJ SPEC WHEN PFRMD N/A 4/24/2019    Procedure: COLONOSCOPY;  Surgeon: Dedra Lawson MD;  Location: [facility]; Service: Gastroenterology    ND ESOPHAGOGASTRODUODENOSCOPY TRANSORAL DIAGNOSTIC N/A 4/24/2019    Procedure: ESOPHAGOGASTRODUODENOSCOPY (EGD); Surgeon: Dedra Lawson MD;  [facility]: [facility]; Service: Gastroenterology          03/04/21 1440   PT Last Visit   PT Visit Date 03/04/21   Note Type   Note type Evaluation   Pain Assessment   Pain Assessment Tool [place]aker FACES   [place]-Baker FACES Pain Rating 2  (with activity only)   Pain Location/Orientation Orientation: Right;Location: Groin; Location: Hip   Hospital Pain Intervention(s) Repositioned; Ambulation/increased activity   Multiple Pain Sites No   Home Living   Type of [address] [facility] Multi-level;Bed/bath upstairs  (2+4 BARRON; bi-level house; 6+7 interior stairs)   Bathroom Shower/Tub Tub/shower unit   Bathroom Toilet Standard   Bathroom Equipment Grab bars in [facility]  (ambulates independently without AD at baseline )   Prior Function   Level of [place] Independent with ADLs and functional mobility   Lives With Alone   Receives Help From Family  (daughter lives 39 min to 1 hour away)   ADL Assistance Independent   IADLs Independent   Falls in the last 6 months 0   Vocational Retired   Comments patient does not drive   Restrictions/Precautions   Wells Lizandro Bearing Precautions Per Order No   Braces or Orthoses Other (Comment)  (none at baseline)   Other Precautions Cognitive; Fall Risk;Pain;Multiple lines  (back safety precautions; osteoporosis)   General   Family/Caregiver Present Yes   Cognition   Overall Cognitive Status Impaired   Arousal/Participation Cooperative   Orientation Level Oriented to person;Oriented to place; Disoriented to situation  (Inconsistent to time)   Memory Decreased short term memory;Decreased recall of recent events   Following Commands Follows one step commands with increased time or repetition   Comments patient agreeable to PT eval   RUE Assessment   RUE Assessment WFL  (based on functional assessment)   LUE Assessment   LUE Assessment WFL  (based on functional assessment)   RLE Assessment   RLE Assessment WFL  (grossly assessed with functional mobility: at least 3+/5)   LLE Assessment   LLE Assessment WFL  (grossly assessed with functional mobility: at least 3+/5)   Coordination   Movements are Fluid and Coordinated 1   Sensation [facility]   Light Touch   RLE Light Touch Grossly intact   LLE Light Touch Grossly intact   Bed Mobility   Rolling L 4  Minimal assistance  (log roll technique)   Additional items Assist x 1; Increased time required;Verbal cues;LE management;HOB elevated   Supine to Sit 3  Moderate assistance  (log roll technique)   Additional items Assist x 1; Increased time required;Verbal cues;LE management;HOB elevated   Sit to Supine 3  Moderate assistance  (log roll technique)   Additional items Assist x 1; Increased time required;Verbal cues;LE management;HOB elevated   Transfers   Sit to Stand 4  Minimal assistance   Additional items Assist x 1; Increased time required;Verbal cues   Stand to Sit 4  Minimal assistance   Additional items Assist x 1; Increased time required;Verbal cues   Ambulation/Elevation   Gait pattern Excessively slow; Short stride;Decreased foot clearance; Antalgic   Gait Assistance 4  Minimal assist   Additional items Assist x 1;Verbal cues   Assistive Device Rolling walker   Distance 80 ft  Stair Management Assistance Not tested   Balance   Static Sitting Fair +   Dynamic Sitting Fair   Static Standing Fair -   Dynamic Standing Poor +   Ambulatory Poor +   Endurance Deficit   Endurance Deficit Yes   Activity Tolerance   Activity Tolerance Patient tolerated treatment well   Kirby Chau made aware of session outcomes/recommendations   Nurse Made Aware CHAVA Euceda made aware of session outcomes   Assessment   Prognosis Good   Problem List Decreased strength;Decreased endurance;Decreased mobility; Impaired balance;Pain;Decreased cognition   Assessment Pt is 78 y o  female seen for PT evaluation s/p admit to [facility] on 3/4/2021 w/ Hip Injury  PT consulted to assess pt's functional mobility and d/c needs  Order placed for PT eval and tx, w/ ambulate patient order  Performed at least 2 patient identifiers during session: Name and   Comorbidities affecting pt's physical performance at time of assessment include: osteoporosis, chronic kidney disease-mineral and bone disorder, dementia without behavioral disturbance, benign hypertension   PTA, pt was independent w/ all functional mobility w/ no AD, ambulates community distances and elevations, has 2+4 BARRON, lives alone in bi-level house and retired  Personal factors affecting pt at time of IE include: inaccessible home environment, ambulating w/ assistive device, stairs to enter home, inability to navigate community distances, inability to navigate level surfaces w/o external assistance, unable to perform dynamic tasks in community, limited home support, inability to perform IADLs and inability to perform ADLs  Please find objective findings from PT assessment regarding body systems outlined above with impairments and limitations including weakness, impaired balance, decreased endurance, gait deviations, pain, decreased activity tolerance, decreased functional mobility tolerance, fall risk and decreased cognition  The following objective measures performed on IE also reveal limitations: Barthel Index: 55/100 and Modified [place]: 4 (moderate/severe disability)  Pt's clinical presentation is currently unstable/unpredictable seen in pt's presentation of ongoing medical management/monitoring, evident in need for assist w/ all phases of mobility when usually mobilizing independently, and need for input for mobility technique/safety  Pt to benefit from continued PT tx to address deficits as defined above and maximize level of functional independent mobility and consistency  From PT/mobility standpoint, recommendation at time of d/c would be STR pending progress in order to facilitate return to PLOF  Barriers to Discharge Decreased caregiver support   Goals   Patient Goals to return home to her cat   STG Expiration Date 03/14/21   Short Term Goal #1 In 7-10 days: Increase bilateral LE strength 1/2 grade to facilitate independent mobility, Perform all bed mobility tasks modified independent to decrease caregiver burden, Perform all transfers modified independent to improve independence, Ambulate > 75 ft   X 2 trials with least restrictive assistive device with distant S w/o LOB and w/ normalized gait pattern 100% of the time, Navigate 6 stairs with distant S with unilateral handrail to facilitate return to previous living environment and Increase all balance 1 grade to decrease risk for falls   PT Treatment Day 0   Plan   Treatment/Interventions Functional transfer training;LE strengthening/ROM; Elevations; Therapeutic exercise; Endurance training;Patient/family training;Equipment eval/education; Bed mobility;Gait training;Spoke to nursing;Spoke to case management;Spoke to MD;Family   PT Frequency Other (Comment)  (3-5x/wk)   Recommendation   PT Discharge Recommendation Post-Acute Rehabilitation Services   PT - OK to Discharge Yes  (when medically cleared; if to STR)   [address] [place] [address] Mobility Inpatient   Turning in Bed Without Bedrails 3   Lying on Back to Sitting on Edge of Flat Bed 2   Moving Bed to Chair 3   Standing Up From Chair 3   Walk in Room 3   Climb 3-5 Stairs 2   Basic Mobility Inpatient Raw Score 16   Basic Mobility Standardized Score 38 32   Modified [place] Scale   Modified [place] Scale 4   Barthel Index   Feeding 10   Bathing 0   Grooming Score 5   Dressing Score 5   Bladder Score 10   Bowels Score 10   Toilet Use Score 5   Transfers (Bed/Chair) Score 10   Mobility (Level Surface) Score 0   Stairs Score 0   Barthel Index Score 55         Cristino Bathe, PT, DPT

## 2021-03-05 ENCOUNTER — NURSING HOME VISIT (OUTPATIENT)
Dept: GERIATRICS | Facility: OTHER | Age: 81
End: 2021-03-05
Payer: COMMERCIAL

## 2021-03-05 DIAGNOSIS — R26.2 AMBULATORY DYSFUNCTION: Primary | ICD-10-CM

## 2021-03-05 DIAGNOSIS — N18.32 STAGE 3B CHRONIC KIDNEY DISEASE (HCC): ICD-10-CM

## 2021-03-05 DIAGNOSIS — R41.89 COGNITIVE IMPAIRMENT: ICD-10-CM

## 2021-03-05 DIAGNOSIS — I10 BENIGN HYPERTENSION: ICD-10-CM

## 2021-03-05 PROCEDURE — 99306 1ST NF CARE HIGH MDM 50: CPT | Performed by: FAMILY MEDICINE

## 2021-03-05 NOTE — PROGRESS NOTES
[address]  [address] [facility] [address] [facility], [address] [facility] 31  History and Physical    NAME: Dior Jimenez  AGE: 78 y o  SEX: female [phone]    DATE OF ENCOUNTER: 3/5/2021    Code status:  CPR    Assessment and Plan     1-Ambulatory dysfunction :  · Chronic back pain with multi level degenerative disease  · No hx of falls or injuries in the last 6 month  · PT/OT   · Fall precaution     2-Cognitive impairment :  · MOCA in 2020 --16/30 with worsening memory compared to prior   · Currently on memantine 10 mg bid,  remeron 15 mg qhs    · Continue supportive management   · Continue current regimen     3-CKD stage III  · currently stable at baseline 1 37  · Avoid hypotension ,nephrotoxins , nsaids   · monitor Bmp     4-Hypertension:  · Currently stable , continue home regimen       All medications and routine orders were reviewed and updated as needed  Plan discussed with: Patient    Chief Complaint     Seen for admission at [address] [place] [address]    History of Present Illness     [facility] [address] [facility] 79 [facility] F with PMHX cognitive impairment,CKD,HTN who recently evaluated at the hospital for right sided hip pain , no reported injuries or fall , HIP XRAY showed no acute fracture,lumbar spine showed no acute fracture but chronic compression fractures   After discussion with the patient and her family she lives alone with no social support case management and PT/OT evals recommended short term rehab      HISTORY:  Past Medical History:   Diagnosis Date    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     Aortic aneurysm (HCC)     4 1 cm on December 6, 2018    Chronic kidney disease     CKD (chronic kidney disease) stage 2, GFR 60-89 ml/min     Colon polyps     Dementia (HCC)     Hyperlipidemia     Hypertension     Insomnia     Memory loss     Osteopenia with elevated frax score     Osteoporosis     Tubular adenoma of colon 04/2019    Uterine fibroid      Family History Problem Relation Age of Onset    Cancer Mother     Cancer Father      Social History     Socioeconomic History    Marital status:      Spouse name: Not on file    Number of children: Not on file    Years of education: Not on file    Highest education level: Not on file   Occupational History    Not on file   Social Needs    Financial resource strain: Not on file    Food insecurity     Worry: Not on file     Inability: Not on file    Transportation needs     Medical: Not on file     Non-medical: Not on file   Tobacco Use    Smoking status: Never Smoker    Smokeless tobacco: Never Used   Substance and Sexual Activity    Alcohol use: Yes     Alcohol/week: 7 0 standard drinks     Types: 7 Glasses of wine per week     Frequency: Monthly or less     Comment: socially     Drug use: No    Sexual activity: Never   Lifestyle    Physical activity     Days per week: Not on file     Minutes per session: Not on file    Stress: Not on file   Relationships    Social connections     Talks on phone: Not on file     Gets together: Not on file     Attends Advent service: Not on file     Active member of club or organization: Not on file     Attends meetings of clubs or organizations: Not on file     Relationship status: Not on file    Intimate partner violence     Fear of current or ex partner: Not on file     Emotionally abused: Not on file     Physically abused: Not on file     Forced sexual activity: Not on file   Other Topics Concern    Not on file   Social History Narrative    Not on file       Allergies: Allergies   Allergen Reactions    Cefazolin      Other reaction(s): Unknown       Review of Systems     Review of Systems   Constitutional: Negative for chills, diaphoresis and fever  Respiratory: Negative for cough, chest tightness, shortness of breath and wheezing  Cardiovascular: Negative for chest pain, palpitations and leg swelling     Gastrointestinal: Negative for abdominal pain, constipation, nausea and vomiting  Genitourinary: Negative for dysuria  Musculoskeletal: Positive for arthralgias and back pain  Neurological: Negative for dizziness, weakness and headaches  Psychiatric/Behavioral: Positive for confusion  The patient is not nervous/anxious  Medications and orders     All medications reviewed and updated in Nursing Home EMR  Objective     Vitals: temp 97 8,HR 64,RR 16 /60 spo2 95%    lbs  Physical Exam  Vitals signs reviewed  Constitutional:       General: She is not in acute distress  Appearance: Normal appearance  She is not ill-appearing  HENT:      Head: Normocephalic and atraumatic  Cardiovascular:      Rate and Rhythm: Normal rate and regular rhythm  Heart sounds: Murmur present  Pulmonary:      Effort: Pulmonary effort is normal  No respiratory distress  Breath sounds: Normal breath sounds  No wheezing  Abdominal:      General: Abdomen is flat  There is no distension  Palpations: Abdomen is soft  Tenderness: There is no abdominal tenderness  Musculoskeletal:         General: Tenderness present  Right lower leg: No edema  Left lower leg: No edema  Skin:     General: Skin is warm and dry  Coloration: Skin is not jaundiced or pale  Findings: No erythema  Neurological:      General: No focal deficit present  Mental Status: She is alert  Psychiatric:         Cognition and Memory: Cognition is impaired  Pertinent Laboratory/Diagnostic Studies: The following labs/studies were reviewed please see chart or hospital paperwork for details    HIP xRAY, Lumbar spine XR , cbc , cmp

## 2021-03-09 ENCOUNTER — NURSING HOME VISIT (OUTPATIENT)
Dept: GERIATRICS | Facility: OTHER | Age: 81
End: 2021-03-09
Payer: COMMERCIAL

## 2021-03-09 DIAGNOSIS — N18.32 STAGE 3B CHRONIC KIDNEY DISEASE (HCC): ICD-10-CM

## 2021-03-09 DIAGNOSIS — I10 BENIGN HYPERTENSION: Primary | ICD-10-CM

## 2021-03-09 DIAGNOSIS — G30.1 LATE ONSET ALZHEIMER'S DISEASE WITHOUT BEHAVIORAL DISTURBANCE (HCC): ICD-10-CM

## 2021-03-09 DIAGNOSIS — F02.80 LATE ONSET ALZHEIMER'S DISEASE WITHOUT BEHAVIORAL DISTURBANCE (HCC): ICD-10-CM

## 2021-03-09 PROCEDURE — 99309 SBSQ NF CARE MODERATE MDM 30: CPT | Performed by: PHYSICIAN ASSISTANT

## 2021-03-09 NOTE — ASSESSMENT & PLAN NOTE
- Only admission BP to review, stable   - continue lisinopril 5 mg po qd  - start BP checks daily x 1 week, then may decrease to 3 times weekly if stable   - next [facility] 3/13/21

## 2021-03-09 NOTE — ASSESSMENT & PLAN NOTE
- Provide frequent redirection, reorientation, distraction techniques  - continue fall precautions at facility  - continue to assist with ADLs at facility   - Avoid deliriogenic medications such as tramadol, benzodiazepines, anticholinergics,  Benadryl  - Encourage Hydration/ Nutrition  - Implement sleep hygiene, limit night time interuptions, group activities  - continue Namenda 10 mg po bid   - continue remeron 15 mg po qhs

## 2021-03-09 NOTE — ASSESSMENT & PLAN NOTE
Lab Results   Component Value Date    EGFR 36 03/04/2021    EGFR 36 12/14/2020    EGFR 34 09/25/2020    CREATININE 1 37 (H) 03/04/2021    CREATININE 1 38 (H) 12/14/2020    CREATININE 1 45 (H) 09/25/2020     - next Bmp 3/13/21, CBC added to next blood draw  - continue to avoid nephrotoxic medications as much as possible   - renally dose medications  - avoid hypotension   - continue to encourage PO hydration

## 2021-03-09 NOTE — PROGRESS NOTES
[facility]  [address]  [phone]  [place]  CODE 31         NAME: Darron Cardona  AGE: 78 y o  SEX: female    DATE OF ENCOUNTER: 3/9/2021     Code status: full code     Assessment and Plan     Dementia without behavioral disturbance  - Provide frequent redirection, reorientation, distraction techniques  - continue fall precautions at facility  - continue to assist with ADLs at facility   - Avoid deliriogenic medications such as tramadol, benzodiazepines, anticholinergics,  Benadryl  - Encourage Hydration/ Nutrition  - Implement sleep hygiene, limit night time interuptions, group activities  - continue Namenda 10 mg po bid   - continue remeron 15 mg po qhs    Benign hypertension  - Only admission BP to review, stable   - continue lisinopril 5 mg po qd  - start BP checks daily x 1 week, then may decrease to 3 times weekly if stable   - next BMP 3/13/21    Stage 3b chronic kidney disease  Lab Results   Component Value Date    EGFR 36 03/04/2021    EGFR 36 12/14/2020    EGFR 34 09/25/2020    CREATININE 1 37 (H) 03/04/2021    CREATININE 1 38 (H) 12/14/2020    CREATININE 1 45 (H) 09/25/2020     - next Bmp 3/13/21, CBC added to next blood draw  - continue to avoid nephrotoxic medications as much as possible   - renally dose medications  - avoid hypotension   - continue to encourage PO hydration      All medications and routine orders were reviewed and updated as needed  Chief Complaint     SNF FOLLOW-UP     History of Present Illness     LT is an 79 yo F with multiple medical comorbidities including but not limited to HTN, dementia, and CKD stage 3, interviewed and examined in collaboration with nursing for evaluation of acute and chronic medical comorbidities at SNF  Pt was recently admitted to SNF from [facility] due to R sided hip pain  Per ED note, pt denied recent injuries and falls   In ED, R hip Xray showed no acute fracture ,lumbar spine showed no acute fracture but did reveal chronic compression fractures  Today, pt does not have any complaints  Pt is unable to provide reliable history due to dementia  Pt notes some mild pain in her low back but reports "I know how to move so I stay comfortable, and I am careful " Per facility chart, pt is completing % of most meals at the facility  Pt denies GI and urinary issues  Per facility chart, pt is continent of urine  Per facility chart, pt's last BM was 3/6/21  Nursing notes Rahul Odell was placed on pt on 3/5/21 due to pt having her bags packed and attempting to carry them out into the hallway to leave the facility to go home  Pt is participating in PT/OT/ST at facility  Per OT, pt requires minimum assistance with lying to sitting on side of bed  Per OT, pt requires supervision with sit to stand and chair to bed transfers, toilet transfers, toileting hygiene, and showering  Per OT, pt requires setup assistance with eating and oral hygiene  Per OT, pt is independent with upper body dressing  Per OT, pt requires moderate assistance with lower body dressing  Per ST, pt actively participates in cognitive exercises  Per PT, pt is independent with rolling in bed and sit to lying  Per PT, pt requires partial/moderate assistance with toilet transfers, lying to sitting on side of bed, and ambulating up stairs x 4  Per PT, pt requires supervision with sit to stand and chair to bed transfers and ambulating 60 ft with RW  The patient's allergies, past medical, surgical, social and family history were reviewed and unchanged  Review of Systems     Review of Systems   Unable to perform ROS: Dementia   Constitutional: Positive for activity change  Negative for appetite change, chills and fever  HENT: Negative for sore throat  Respiratory: Negative for cough and shortness of breath  Cardiovascular: Negative for chest pain and palpitations     Gastrointestinal: Negative for abdominal distention, abdominal pain, diarrhea, nausea and vomiting  Genitourinary: Negative for difficulty urinating and dysuria  Musculoskeletal: Positive for arthralgias, back pain (low back) and gait problem  Neurological: Positive for weakness  Negative for dizziness, light-headedness and headaches  Psychiatric/Behavioral: Negative for suicidal ideas  Objective     Vitals: 120/60- 97 6F- 64 bpm- 113lb- 18- 96% on RA  Physical Exam  Vitals signs and nursing note reviewed  Constitutional:       General: She is not in acute distress  Appearance: She is not diaphoretic  Comments: Elderly female seated comfortably in chair   HENT:      Head: Normocephalic and atraumatic  Nose: Nose normal  No congestion or rhinorrhea  Mouth/Throat:      Mouth: Mucous membranes are moist       Pharynx: Oropharynx is clear  No oropharyngeal exudate or posterior oropharyngeal erythema  Eyes:      General: No scleral icterus  Right eye: No discharge  Left eye: No discharge  Conjunctiva/sclera: Conjunctivae normal    Cardiovascular:      Rate and Rhythm: Normal rate and regular rhythm  Heart sounds: Murmur present  No friction rub  No gallop  Pulmonary:      Effort: Pulmonary effort is normal  No respiratory distress  Breath sounds: Normal breath sounds  No stridor  No wheezing or rales  Abdominal:      General: Bowel sounds are normal  There is no distension  Palpations: Abdomen is soft  Tenderness: There is no abdominal tenderness  There is no guarding or rebound  Musculoskeletal:         General: Tenderness (slight, low back paraspinal) present  Neurological:      Mental Status: She is alert        Comments: Oriented to person and year, answered "I am in the best hospital with the [facility] people" for place    Psychiatric:      Comments: Pleasant and cooperative during exam          Pertinent Laboratory/Diagnostic Studies:  6/8/44  BASIC METABOLIC PNL  GLUCOSE 84 mg/dL 65-99 Final  BUN 31 mg/dL 7-25 H Final  CREATININE 1 28 mg/dL 0 40-1 10 H Final  SODIUM 143 mmol/L 135-145 Final  POTASSIUM 4 0 mmol/L 3 5-5 2 Final  CHLORIDE 111 mmol/L 100-109 H Final  CARBON DIOXIDE 24 mmol/L 23-31 Final  CALCIUM 9 2 mg/dL 8 5-10 1 Final  ANION GAP 8 3-11 Final  eGFR, NON  AM 39 >60 L Final  eGFR,  AMER 45 >60 L Final    Current Medications   Medications reviewed and updated see facility [facility] for details      Adam Martin PA-C  3/9/2021 3:04 PM

## 2021-03-17 ENCOUNTER — NURSING HOME VISIT (OUTPATIENT)
Dept: GERIATRICS | Facility: OTHER | Age: 81
End: 2021-03-17
Payer: COMMERCIAL

## 2021-03-17 DIAGNOSIS — F02.80 LATE ONSET ALZHEIMER'S DISEASE WITHOUT BEHAVIORAL DISTURBANCE (HCC): ICD-10-CM

## 2021-03-17 DIAGNOSIS — G30.1 LATE ONSET ALZHEIMER'S DISEASE WITHOUT BEHAVIORAL DISTURBANCE (HCC): ICD-10-CM

## 2021-03-17 DIAGNOSIS — I10 BENIGN HYPERTENSION: Primary | ICD-10-CM

## 2021-03-17 DIAGNOSIS — N18.32 STAGE 3B CHRONIC KIDNEY DISEASE (HCC): ICD-10-CM

## 2021-03-17 PROCEDURE — 99316 NF DSCHRG MGMT 30 MIN+: CPT | Performed by: PHYSICIAN ASSISTANT

## 2021-03-17 NOTE — PROGRESS NOTES
[address] to [facility]  [address]  [phone]  [address] [place] [address] St: [address]  Code 32     NAME: Yessy Bolden  AGE: 78 y o  SEX: female  DATE OF ADMISSION: 3/4/21  DATE OF DISCHARGE:3/18/21  DISCHARGE DISPOSITION: Home with 24/7 supervision  Reason for admission: Patient was admitted from [facility] for rehabilitation after hospitalization for L hip injury  Course of stay:   LT is an 77 yo F with multiple medical comorbidities including but not limited to HTN, dementia, and CKD stage 3, interviewed and examined in collaboration with nursing for evaluation of acute and chronic medical comorbidities at SNF for discharge       Pt was recently admitted to SNF from [facility] due to R sided hip pain  Per ED note, pt denied recent injuries and falls  In ED, R hip Xray showed no acute fracture ,lumbar spine showed no acute fracture but did reveal chronic compression fractures       Today, pt does not have any complaints  Pt is unable to provide reliable history due to dementia  Pt complains of some mild low back pain today  Pt denies limited pain radiating into buttock (mostly L)  Pt denies loss of bladder/bowel function  Pt denies saddle anesthesia  Per facility chart, pt is completing % of most meals at the facility  Pt denies GI and urinary issues  Per facility chart, pt is continent of urine  Per facility chart, pt's last BM was 3/16/21  Nursing notes Daily Luong was placed on pt on 3/5/21 due to pt having her bags packed and attempting to carry them out into the hallway to leave the facility to go home  Pt is participating in PT/OT/ST at facility  Per OT, pt requires set up assistance with eating, hygiene, and toileting  Per OT, pt requires supervision with showering and lower body dressing  Per OT, pt is independent with upper body dressing  Per ST, pt actively participates in cognitive exercises  Per PT, pt is independent with bed mobility and transfers   Per PT, pt requires supervision with ambulating 150 ft with assistive device  Between pt's family and hired [facility]HA, pt will have 24/7 support at home  ROS:  Review of Systems   Unable to perform ROS: Dementia   Constitutional: Positive for activity change  Negative for chills and fever  HENT: Negative for sore throat  Respiratory: Negative for cough and shortness of breath  Cardiovascular: Negative for chest pain and palpitations  Gastrointestinal: Negative for abdominal distention, abdominal pain, diarrhea, nausea and vomiting  Genitourinary: Negative for difficulty urinating and dysuria  Musculoskeletal: Positive for arthralgias, back pain and gait problem  Neurological: Positive for weakness  Negative for dizziness, light-headedness and headaches  Psychiatric/Behavioral: Negative for suicidal ideas  PHYSICAL EXAM:  VITALS: 124/66- 97 3- 79 bpm- 112 4lb- 18- 96% on RA  Physical Exam  Vitals signs and nursing note reviewed  Constitutional:       General: She is not in acute distress  Appearance: She is not diaphoretic  Comments: Elderly female seated comfortably in chair    HENT:      Head: Normocephalic and atraumatic  Nose: Nose normal  No congestion or rhinorrhea  Mouth/Throat:      Mouth: Mucous membranes are moist       Pharynx: Oropharynx is clear  No oropharyngeal exudate or posterior oropharyngeal erythema  Eyes:      General: No scleral icterus  Right eye: No discharge  Left eye: No discharge  Conjunctiva/sclera: Conjunctivae normal    Cardiovascular:      Rate and Rhythm: Normal rate and regular rhythm  Heart sounds: Murmur present  No friction rub  No gallop  Pulmonary:      Effort: Pulmonary effort is normal  No respiratory distress  Breath sounds: Normal breath sounds  No stridor  No wheezing or rales  Abdominal:      General: Bowel sounds are normal  There is no distension  Palpations: Abdomen is soft  Tenderness:  There is no abdominal tenderness  There is no guarding or rebound  Musculoskeletal:      Right lower leg: No edema  Left lower leg: No edema  Comments: Able to lift extremities x 4 against gravity   Neurological:      Mental Status: She is alert  Comments: Only surekha to recall brian name, able to state year, able to state place (state)   Psychiatric:      Comments: Pleasant and cooperative during exam          Admission Diagnoses: ambulatory dysfunction, cognitive impairment, CKD stage 3, HTN    Discharge Diagnoses:      Problem List Items Addressed This Visit        Cardiovascular and Mediastinum    Benign hypertension - Primary     - BP log reviewed, stable   - continue lisinopril 5 mg po qd  - continue to monitor BP at home   Recommend obtaining home BP cuff and taking BP multiple times over a week, then bring log to PCP to review             Nervous and Auditory    Dementia without behavioral disturbance ([facility] [address] )     - Provide frequent redirection, reorientation, distraction techniques  - continue fall precautions at facility and at home  - continue to assist with ADLs at facility and at home  - Avoid deliriogenic medications such as tramadol, benzodiazepines, anticholinergics,  Benadryl  - Encourage Hydration/ Nutrition  - Implement sleep hygiene, limit night time interuptions, group activities  - continue Namenda 10 mg po bid  - continue mirtazapine 15 mg po qhs            Genitourinary    Stage 3b chronic kidney disease     Lab Results   Component Value Date    EGFR 36 03/04/2021    EGFR 36 12/14/2020    EGFR 34 09/25/2020    CREATININE 1 37 (H) 03/04/2021    CREATININE 1 38 (H) 12/14/2020    CREATININE 1 45 (H) 09/25/2020     - continue to monitor kidney function with PCP, stale while at facility   - continue to avoid nephrotoxic medications as much as possible   - renally dose medications  - avoid hypotension                 Follow-up Recommendations: PCP     Labs and testing performed during stay:  5/11/63  BASIC METABOLIC PNL  GLUCOSE 83 mg/dL 65-99 Final  BUN 34 mg/dL 7-25 H Final  CREATININE 1 36 mg/dL 0 40-1 10 H Final  SODIUM 144 mmol/L 135-145 Final  POTASSIUM 4 5 mmol/L 3 5-5 2 Final  CHLORIDE 112 mmol/L 100-109 H Final  CARBON DIOXIDE 27 mmol/L 23-31 Final  CALCIUM 9 1 mg/dL 8 5-10 1 Final  ANION GAP 5 3-11 Final  eGFR, NON  AM 36 >60 L Final  eGFR,  AMER 42 >60 L Final  eGFR COMMENT (Note) Final   Units: mL/min per 1 73 square meters   Normal Function or Mild Renal   Disease (if clinically at risk): >or=60   Moderately Decreased: 30-59   Severely Decreased: 15-29   Renal Failure: <15   Please note that the eGFR is based on the CKD-EPI calculation, and is   not intended to be used for drug dosing  Note: Calculated GFR may not be an accurate indicator of renal   function if the patient's renal function is not in a steady state  CBC NO DIFF  HEMOGLOBIN 11 0 g/dL 11 5-14 5 L Final  HEMATOCRIT 32 1 % 35 0-43 0 L Final  WBC 6 6 thou/cmm 4 0-10 0 Final  RBC 3 39 mill/cmm 3 70-4 70 L Final  PLATELET COUNT 105 thou/cmm 140-350 H Final       Discharge Medications: See discharge medication list which was reviewed and signed        Current Outpatient Medications:     aspirin 81 MG tablet, Take 1 tablet (81 mg total) by mouth daily, Disp: , Rfl:     lisinopril (ZESTRIL) 5 mg tablet, TAKE 1 TABLET DAILY, Disp: 90 tablet, Rfl: 0    memantine (NAMENDA) 10 mg tablet, Take 1 tablet (10 mg total) by mouth 2 (two) times a day, Disp: 180 tablet, Rfl: 1    mirtazapine (REMERON) 15 mg tablet, TAKE ONE TABLET BY MOUTH EVERY DAY AT BEDTIME, Disp: 30 tablet, Rfl: 5    pantoprazole (PROTONIX) 40 mg tablet, TAKE ONE TABLET BY MOUTH EVERY DAY, Disp: 90 tablet, Rfl: 1      Discussion with patient/family and further instructions:  -Fall precautions  -Aspiration precautions  -Bleeding precautions  -Monitor for signs/symptoms of infection  -Medication list was reviewed and signed  -DME form was completed    Status at time of discharge: Stable    Billing based on time  Time spent on unit, 40 minutes  Time spent counseling pt on debility/condition, 30 minutes        Gentry Larsen PA-C  3/17/18284:15 PM

## 2021-03-17 NOTE — ASSESSMENT & PLAN NOTE
- BP log reviewed, stable   - continue lisinopril 5 mg po qd  - continue to monitor BP at home   Recommend obtaining home BP cuff and taking BP multiple times over a week, then bring log to PCP to review

## 2021-03-17 NOTE — ASSESSMENT & PLAN NOTE
Lab Results   Component Value Date    EGFR 36 03/04/2021    EGFR 36 12/14/2020    EGFR 34 09/25/2020    CREATININE 1 37 (H) 03/04/2021    CREATININE 1 38 (H) 12/14/2020    CREATININE 1 45 (H) 09/25/2020     - continue to monitor kidney function with PCP, stale while at facility   - continue to avoid nephrotoxic medications as much as possible   - renally dose medications  - avoid hypotension

## 2021-03-17 NOTE — ASSESSMENT & PLAN NOTE
- Provide frequent redirection, reorientation, distraction techniques  - continue fall precautions at facility and at home  - continue to assist with ADLs at facility and at home  - Avoid deliriogenic medications such as tramadol, benzodiazepines, anticholinergics,  Benadryl  - Encourage Hydration/ Nutrition  - Implement sleep hygiene, limit night time interuptions, group activities  - continue Namenda 10 mg po bid  - continue mirtazapine 15 mg po qhs

## 2021-03-18 ENCOUNTER — TELEPHONE (OUTPATIENT)
Dept: FAMILY MEDICINE CLINIC | Facility: CLINIC | Age: 81
End: 2021-03-18

## 2021-03-18 NOTE — TELEPHONE ENCOUNTER
Keila Santos is requesting rolling wheelchair for patient Diagnosis  [phone] fax referral to [phone]   Please advise, Thank barrington thayer

## 2021-03-19 ENCOUNTER — TELEPHONE (OUTPATIENT)
Dept: FAMILY MEDICINE CLINIC | Facility: CLINIC | Age: 81
End: 2021-03-19

## 2021-03-19 NOTE — TELEPHONE ENCOUNTER
Stephanie Husbands called again, she said she did receive the order, but she needs an order for a rolling walker  Could you please make a new script and we will send it? I did apologize to her, it looks like there was a miscommunication in the message

## 2021-03-24 ENCOUNTER — OFFICE VISIT (OUTPATIENT)
Dept: FAMILY MEDICINE CLINIC | Facility: CLINIC | Age: 81
End: 2021-03-24
Payer: COMMERCIAL

## 2021-03-24 VITALS
RESPIRATION RATE: 16 BRPM | SYSTOLIC BLOOD PRESSURE: 100 MMHG | HEART RATE: 89 BPM | DIASTOLIC BLOOD PRESSURE: 66 MMHG | BODY MASS INDEX: 20.61 KG/M2 | WEIGHT: 112 LBS | HEIGHT: 62 IN | OXYGEN SATURATION: 98 % | TEMPERATURE: 98.3 F

## 2021-03-24 DIAGNOSIS — F02.80 LATE ONSET ALZHEIMER'S DISEASE WITHOUT BEHAVIORAL DISTURBANCE (HCC): ICD-10-CM

## 2021-03-24 DIAGNOSIS — I10 BENIGN HYPERTENSION: Primary | ICD-10-CM

## 2021-03-24 DIAGNOSIS — N18.32 STAGE 3B CHRONIC KIDNEY DISEASE (HCC): ICD-10-CM

## 2021-03-24 DIAGNOSIS — G30.1 LATE ONSET ALZHEIMER'S DISEASE WITHOUT BEHAVIORAL DISTURBANCE (HCC): ICD-10-CM

## 2021-03-24 PROCEDURE — 99214 OFFICE O/P EST MOD 30 MIN: CPT | Performed by: NURSE PRACTITIONER

## 2021-03-24 RX ORDER — METHYLPREDNISOLONE 4 MG/1
TABLET ORAL
COMMUNITY
Start: 2021-03-04 | End: 2021-03-24 | Stop reason: ALTCHOICE

## 2021-03-24 RX ORDER — CYCLOBENZAPRINE HCL 5 MG
TABLET ORAL
COMMUNITY
Start: 2021-03-04 | End: 2021-04-15 | Stop reason: ALTCHOICE

## 2021-03-24 NOTE — ASSESSMENT & PLAN NOTE
Advised to discontinue lisinopril and call office if remaining >130/90 consistently  Patient and caregiver agree to plan

## 2021-03-24 NOTE — PROGRESS NOTES
Assessment/Plan:     Chronic Problems:  Benign hypertension  Advised to discontinue lisinopril and call office if remaining >130/90 consistently  Patient and caregiver agree to plan  Dementia without behavioral disturbance  At baseline, doing well with around the clock care at home  Stage 3b chronic kidney disease  Lab Results   Component Value Date    EGFR 36 03/04/2021    EGFR 36 12/14/2020    EGFR 34 09/25/2020    CREATININE 1 37 (H) 03/04/2021    CREATININE 1 38 (H) 12/14/2020    CREATININE 1 45 (H) 09/25/2020   Patient does have follow up and labs with nephrology coming up  Visit Diagnosis:  Diagnoses and all orders for this visit:    Benign hypertension    Late onset Alzheimer's disease without behavioral disturbance ([facility] [address] )    Stage 3b chronic kidney disease    Other orders  -     Discontinue: methylPREDNISolone 4 MG tablet therapy pack  -     cyclobenzaprine (FLEXERIL) 5 mg tablet          Subjective:    Patient ID: Queenie Colon is a 78 y o  female  Patient presents with her home health aide for follow-up from discharge from nursing home  Patient is now living at home alone and has around the clock care through [address]  Patient states she is doing well and is very happy with her care  She does have a follow up with nephrology  She does have US kidney scheduled as well  It does appear patient has been on and off lisinopril  BP is soft today in office  I did discuss with patient and caregiver to stop the lisinopril  The caregiver asked to keep it on the medication list in case the patient needs it as she is not allowed to distribute medications  The following portions of the patient's history were reviewed and updated as appropriate: allergies, current medications, past family history, past medical history, past social history, past surgical history and problem list     Review of Systems   Constitutional: Negative for chills and fever  HENT: Negative for ear pain and sore throat  Eyes: Negative for pain and visual disturbance  Respiratory: Negative for cough and shortness of breath  Cardiovascular: Negative for chest pain and palpitations  Gastrointestinal: Negative for abdominal pain and vomiting  Genitourinary: Negative for dysuria and hematuria  Musculoskeletal: Negative for arthralgias and back pain  Skin: Negative for color change and rash  Neurological: Negative for seizures and syncope  Psychiatric/Behavioral: Negative for dysphoric mood and sleep disturbance  The patient is not nervous/anxious  All other systems reviewed and are negative  /66   Pulse 89   Temp 98 3 °F (36 8 °C) (Tympanic)   Resp 16   Ht 5' 1 5" (1 562 m)   Wt 50 8 kg (112 lb)   LMP  (LMP Unknown)   SpO2 98%   BMI 20 82 kg/m²   Social History     Socioeconomic History    Marital status:      Spouse name: Not on file    Number of children: Not on file    Years of education: Not on file    Highest education level: Not on file   Occupational History    Not on file   Social Needs    Financial resource strain: Not on file    Food insecurity     Worry: Not on file     Inability: Not on file    Transportation needs     Medical: Not on file     Non-medical: Not on file   Tobacco Use    Smoking status: Never Smoker    Smokeless tobacco: Never Used   Substance and Sexual Activity    Alcohol use:  Yes     Alcohol/week: 7 0 standard drinks     Types: 7 Glasses of wine per week     Frequency: Monthly or less     Comment: socially     Drug use: No    Sexual activity: Never   Lifestyle    Physical activity     Days per week: Not on file     Minutes per session: Not on file    Stress: Not on file   Relationships    Social connections     Talks on phone: Not on file     Gets together: Not on file     Attends [employer] service: Not on file     Active member of club or organization: Not on file     Attends meetings of clubs or organizations: Not on file     Relationship status: Not on file    Intimate partner violence     Fear of current or ex partner: Not on file     Emotionally abused: Not on file     Physically abused: Not on file     Forced sexual activity: Not on file   Other Topics Concern    Not on file   Social History Narrative    Not on file     Past Medical History:   Diagnosis Date    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     Aortic aneurysm (HCC)     4 1 cm on December 6, 2018    Chronic kidney disease     CKD (chronic kidney disease) stage 2, GFR 60-89 ml/min     Colon polyps     Dementia ([address] )     Hyperlipidemia     Hypertension     Insomnia     Memory loss     Osteopenia with elevated frax score     Osteoporosis     Tubular adenoma of colon 04/2019    Uterine fibroid      Family History   Problem Relation Age of Onset    Cancer Mother     Cancer Father      Past Surgical History:   Procedure Laterality Date    COLONOSCOPY  7550    date not certain     FOOT SURGERY      [place] COLONOSCOPY FLX DX W/COLLJ SPEC WHEN PFRMD N/A 4/24/2019    Procedure: COLONOSCOPY;  Surgeon: Joby Pond MD;  Location: [facility]; Service: Gastroenterology    NJ ESOPHAGOGASTRODUODENOSCOPY TRANSORAL DIAGNOSTIC N/A 4/24/2019    Procedure: ESOPHAGOGASTRODUODENOSCOPY (EGD); Surgeon: Joby Pond MD;  Location: [facility];   Service: Gastroenterology       Current Outpatient Medications:     aspirin 81 MG tablet, Take 1 tablet (81 mg total) by mouth daily, Disp: , Rfl:     lisinopril (ZESTRIL) 5 mg tablet, TAKE 1 TABLET DAILY, Disp: 90 tablet, Rfl: 0    memantine (NAMENDA) 10 mg tablet, Take 1 tablet (10 mg total) by mouth 2 (two) times a day, Disp: 180 tablet, Rfl: 1    mirtazapine (REMERON) 15 mg tablet, TAKE ONE TABLET BY MOUTH EVERY DAY AT BEDTIME, Disp: 30 tablet, Rfl: 5    pantoprazole (PROTONIX) 40 mg tablet, TAKE ONE TABLET BY MOUTH EVERY DAY, Disp: 90 tablet, Rfl: 1    cyclobenzaprine (FLEXERIL) 5 mg tablet, , Disp: , Rfl:     Allergies Allergen Reactions    Cefazolin      Other reaction(s): Unknown          Lab Review   Lab Requisition on 03/17/2021   Component Date Value    SARS-CoV-2 03/17/2021 Negative    Lab Requisition on 03/10/2021   Component Date Value    SARS-CoV-2 03/10/2021 Negative    Admission on 03/04/2021, Discharged on 03/04/2021   Component Date Value    SARS-CoV-2 03/04/2021 Negative     INFLUENZA A PCR 03/04/2021 Negative     INFLUENZA B PCR 03/04/2021 Negative     RSV PCR 03/04/2021 Negative     WBC 03/04/2021 8 07     RBC 03/04/2021 3 88     Hemoglobin 03/04/2021 12 1     Hematocrit 03/04/2021 38 4     MCV 03/04/2021 99*    MCH 03/04/2021 31 2     MCHC 03/04/2021 31 5     RDW 03/04/2021 13 3     MPV 03/04/2021 9 2     Platelets 67/33/7925 373     nRBC 03/04/2021 0     Neutrophils Relative 03/04/2021 58     Immat GRANS % 03/04/2021 1     Lymphocytes Relative 03/04/2021 28     Monocytes Relative 03/04/2021 8     Eosinophils Relative 03/04/2021 3     Basophils Relative 03/04/2021 2*    Neutrophils Absolute 03/04/2021 4 82     Immature Grans Absolute 03/04/2021 0 04     Lymphocytes Absolute 03/04/2021 2 26     Monocytes Absolute 03/04/2021 0 61     Eosinophils Absolute 03/04/2021 0 21     Basophils Absolute 03/04/2021 0 13*    Sodium 03/04/2021 143     Potassium 03/04/2021 3 9     Chloride 03/04/2021 107     CO2 03/04/2021 24     ANION GAP 03/04/2021 12     BUN 03/04/2021 23     Creatinine 03/04/2021 1 37*    Glucose 03/04/2021 92     Calcium 03/04/2021 9 7     eGFR 03/04/2021 36     Total Bilirubin 03/04/2021 0 60     Bilirubin, Direct 03/04/2021 0 13     Alkaline Phosphatase 03/04/2021 91     AST 03/04/2021 26     ALT 03/04/2021 26     Total Protein 03/04/2021 7 9     Albumin 03/04/2021 4 0         Imaging: Xr Spine Lumbar 2 Or 3 Views Injury    Result Date: 2/28/2021  Narrative: LUMBAR SPINE INDICATION:   M54 5: Low back pain   COMPARISON:  None VIEWS:  XR SPINE LUMBAR 2 OR 3 VIEWS INJURY FINDINGS: There are 5 non rib bearing lumbar vertebral bodies  Mild age-indeterminate compression fracture superior endplate of L1  This could be followed up with MRI if clinically warranted  No destructive osseous lesion  Mild levoconvex scoliosis  Marginal osteophytes at all lumbar levels  Moderate disc height loss at L4-5 and L5-S1  Minimal disc height loss at L2-3  Mild to moderate facet degenerative changes at L3-4 through L5-S1  The pedicles appear intact  There are atherosclerotic calcifications  Soft tissues are otherwise unremarkable  Impression: Mild age-indeterminate compression fracture superior endplate of L1  This could be followed up with MRI if clinically warranted  Multilevel degenerative changes more prominent at L4-5 and L5-S1  Workstation performed: [id number]       Objective:     Physical Exam  Constitutional:       Appearance: She is well-developed  Cardiovascular:      Rate and Rhythm: Normal rate and regular rhythm  Heart sounds: Normal heart sounds  No murmur  Pulmonary:      Effort: Pulmonary effort is normal  No respiratory distress  Breath sounds: Normal breath sounds  Skin:     General: Skin is warm and dry  Neurological:      Mental Status: She is alert and oriented to person, place, and time  There are no Patient Instructions on file for this visit  DASIA Martines    Portions of the record may have been created with voice recognition software  Occasional wrong word or "sound a like" substitutions may have occurred due to the inherent limitations of voice recognition software  Read the chart carefully and recognize, using context, where substitutions have occurred

## 2021-03-24 NOTE — ASSESSMENT & PLAN NOTE
Lab Results   Component Value Date    EGFR 36 03/04/2021    EGFR 36 12/14/2020    EGFR 34 09/25/2020    CREATININE 1 37 (H) 03/04/2021    CREATININE 1 38 (H) 12/14/2020    CREATININE 1 45 (H) 09/25/2020   Patient does have follow up and labs with nephrology coming up

## 2021-03-26 ENCOUNTER — HOSPITAL ENCOUNTER (OUTPATIENT)
Dept: CT IMAGING | Facility: HOSPITAL | Age: 81
Discharge: HOME | End: 2021-03-26
Payer: COMMERCIAL

## 2021-03-26 DIAGNOSIS — I71.2 ASCENDING AORTIC ANEURYSM (HCC): ICD-10-CM

## 2021-03-26 PROCEDURE — G1004 CDSM NDSC: HCPCS

## 2021-03-26 PROCEDURE — 71250 CT THORAX DX C-: CPT

## 2021-03-30 ENCOUNTER — HOSPITAL ENCOUNTER (OUTPATIENT)
Dept: ULTRASOUND IMAGING | Facility: HOSPITAL | Age: 81
Discharge: HOME | End: 2021-03-30
Attending: INTERNAL MEDICINE
Payer: COMMERCIAL

## 2021-03-30 ENCOUNTER — APPOINTMENT (OUTPATIENT)
Dept: LAB | Facility: HOSPITAL | Age: 81
End: 2021-03-30
Attending: INTERNAL MEDICINE
Payer: COMMERCIAL

## 2021-03-30 DIAGNOSIS — N18.32 STAGE 3B CHRONIC KIDNEY DISEASE (HCC): ICD-10-CM

## 2021-03-30 DIAGNOSIS — M89.9 CHRONIC KIDNEY DISEASE-MINERAL AND BONE DISORDER: ICD-10-CM

## 2021-03-30 DIAGNOSIS — E83.9 CHRONIC KIDNEY DISEASE-MINERAL AND BONE DISORDER: Primary | ICD-10-CM

## 2021-03-30 DIAGNOSIS — N18.9 CHRONIC KIDNEY DISEASE-MINERAL AND BONE DISORDER: ICD-10-CM

## 2021-03-30 DIAGNOSIS — M89.9 CHRONIC KIDNEY DISEASE-MINERAL AND BONE DISORDER: Primary | ICD-10-CM

## 2021-03-30 DIAGNOSIS — E83.9 CHRONIC KIDNEY DISEASE-MINERAL AND BONE DISORDER: ICD-10-CM

## 2021-03-30 DIAGNOSIS — N18.30 CKD (CHRONIC KIDNEY DISEASE) STAGE 3, GFR 30-59 ML/MIN (HCC): ICD-10-CM

## 2021-03-30 DIAGNOSIS — N18.9 CHRONIC KIDNEY DISEASE-MINERAL AND BONE DISORDER: Primary | ICD-10-CM

## 2021-03-30 LAB
25(OH)D3 SERPL-MCNC: 23.1 NG/ML (ref 30–100)
ANION GAP SERPL CALCULATED.3IONS-SCNC: 10 MMOL/L (ref 4–13)
BASOPHILS # BLD AUTO: 0.12 THOUSANDS/ΜL (ref 0–0.1)
BASOPHILS NFR BLD AUTO: 2 % (ref 0–1)
BILIRUB UR QL STRIP: NEGATIVE
BUN SERPL-MCNC: 19 MG/DL (ref 5–25)
CALCIUM SERPL-MCNC: 8.5 MG/DL (ref 8.3–10.1)
CHLORIDE SERPL-SCNC: 102 MMOL/L (ref 100–108)
CLARITY UR: CLEAR
CO2 SERPL-SCNC: 26 MMOL/L (ref 21–32)
COLOR UR: YELLOW
CREAT SERPL-MCNC: 1.42 MG/DL (ref 0.6–1.3)
CREAT UR-MCNC: 21.7 MG/DL
EOSINOPHIL # BLD AUTO: 0.21 THOUSAND/ΜL (ref 0–0.61)
EOSINOPHIL NFR BLD AUTO: 3 % (ref 0–6)
ERYTHROCYTE [DISTWIDTH] IN BLOOD BY AUTOMATED COUNT: 12.2 % (ref 11.6–15.1)
GFR SERPL CREATININE-BSD FRML MDRD: 34 ML/MIN/1.73SQ M
GLUCOSE P FAST SERPL-MCNC: 91 MG/DL (ref 65–99)
GLUCOSE UR STRIP-MCNC: NEGATIVE MG/DL
HCT VFR BLD AUTO: 33.8 % (ref 34.8–46.1)
HGB BLD-MCNC: 11 G/DL (ref 11.5–15.4)
HGB UR QL STRIP.AUTO: NEGATIVE
IMM GRANULOCYTES # BLD AUTO: 0.02 THOUSAND/UL (ref 0–0.2)
IMM GRANULOCYTES NFR BLD AUTO: 0 % (ref 0–2)
KETONES UR STRIP-MCNC: NEGATIVE MG/DL
LEUKOCYTE ESTERASE UR QL STRIP: NEGATIVE
LYMPHOCYTES # BLD AUTO: 1.44 THOUSANDS/ΜL (ref 0.6–4.47)
LYMPHOCYTES NFR BLD AUTO: 21 % (ref 14–44)
MCH RBC QN AUTO: 31.9 PG (ref 26.8–34.3)
MCHC RBC AUTO-ENTMCNC: 32.5 G/DL (ref 31.4–37.4)
MCV RBC AUTO: 98 FL (ref 82–98)
MONOCYTES # BLD AUTO: 0.5 THOUSAND/ΜL (ref 0.17–1.22)
MONOCYTES NFR BLD AUTO: 7 % (ref 4–12)
NEUTROPHILS # BLD AUTO: 4.63 THOUSANDS/ΜL (ref 1.85–7.62)
NEUTS SEG NFR BLD AUTO: 67 % (ref 43–75)
NITRITE UR QL STRIP: NEGATIVE
NRBC BLD AUTO-RTO: 0 /100 WBCS
PH UR STRIP.AUTO: 5.5 [PH]
PHOSPHATE SERPL-MCNC: 3.1 MG/DL (ref 2.3–4.1)
PLATELET # BLD AUTO: 373 THOUSANDS/UL (ref 149–390)
PMV BLD AUTO: 10.2 FL (ref 8.9–12.7)
POTASSIUM SERPL-SCNC: 3.6 MMOL/L (ref 3.5–5.3)
PROT UR STRIP-MCNC: NEGATIVE MG/DL
PROT UR-MCNC: <6 MG/DL
PROT/CREAT UR: <0.28 MG/G{CREAT} (ref 0–0.1)
PTH-INTACT SERPL-MCNC: 51.9 PG/ML (ref 18.4–80.1)
RBC # BLD AUTO: 3.45 MILLION/UL (ref 3.81–5.12)
SODIUM SERPL-SCNC: 138 MMOL/L (ref 136–145)
SP GR UR STRIP.AUTO: <=1.005 (ref 1–1.03)
UROBILINOGEN UR QL STRIP.AUTO: 0.2 E.U./DL
WBC # BLD AUTO: 6.92 THOUSAND/UL (ref 4.31–10.16)

## 2021-03-30 PROCEDURE — 85025 COMPLETE CBC W/AUTO DIFF WBC: CPT

## 2021-03-30 PROCEDURE — 36415 COLL VENOUS BLD VENIPUNCTURE: CPT

## 2021-03-30 PROCEDURE — 83970 ASSAY OF PARATHORMONE: CPT

## 2021-03-30 PROCEDURE — 82306 VITAMIN D 25 HYDROXY: CPT

## 2021-03-30 PROCEDURE — 80048 BASIC METABOLIC PNL TOTAL CA: CPT

## 2021-03-30 PROCEDURE — 82570 ASSAY OF URINE CREATININE: CPT

## 2021-03-30 PROCEDURE — 76770 US EXAM ABDO BACK WALL COMP: CPT

## 2021-03-30 PROCEDURE — 81003 URINALYSIS AUTO W/O SCOPE: CPT

## 2021-03-30 PROCEDURE — 84156 ASSAY OF PROTEIN URINE: CPT

## 2021-03-30 PROCEDURE — 84100 ASSAY OF PHOSPHORUS: CPT

## 2021-04-15 ENCOUNTER — OFFICE VISIT (OUTPATIENT)
Dept: FAMILY MEDICINE CLINIC | Facility: CLINIC | Age: 81
End: 2021-04-15
Payer: COMMERCIAL

## 2021-04-15 VITALS
BODY MASS INDEX: 21.05 KG/M2 | HEIGHT: 62 IN | DIASTOLIC BLOOD PRESSURE: 67 MMHG | RESPIRATION RATE: 16 BRPM | HEART RATE: 63 BPM | SYSTOLIC BLOOD PRESSURE: 123 MMHG | OXYGEN SATURATION: 93 % | WEIGHT: 114.4 LBS

## 2021-04-15 DIAGNOSIS — I10 BENIGN HYPERTENSION: ICD-10-CM

## 2021-04-15 DIAGNOSIS — F34.1 DYSTHYMIA: Primary | ICD-10-CM

## 2021-04-15 DIAGNOSIS — F02.80 LATE ONSET ALZHEIMER'S DISEASE WITHOUT BEHAVIORAL DISTURBANCE (HCC): ICD-10-CM

## 2021-04-15 DIAGNOSIS — G30.1 LATE ONSET ALZHEIMER'S DISEASE WITHOUT BEHAVIORAL DISTURBANCE (HCC): ICD-10-CM

## 2021-04-15 DIAGNOSIS — N18.32 STAGE 3B CHRONIC KIDNEY DISEASE (HCC): ICD-10-CM

## 2021-04-15 DIAGNOSIS — S32.010G COMPRESSION FRACTURE OF L1 VERTEBRA WITH DELAYED HEALING, SUBSEQUENT ENCOUNTER: ICD-10-CM

## 2021-04-15 PROCEDURE — 99214 OFFICE O/P EST MOD 30 MIN: CPT | Performed by: FAMILY MEDICINE

## 2021-04-15 NOTE — PROGRESS NOTES
Assessment/Plan:     Chronic Problems:  Dementia without behavioral disturbance  Pt is intolerant of aricept and exelon  Has f/u with neuro  Advised to keep the appt  Benign hypertension  BP is at goal  Stay on current meds  Stage 3b chronic kidney disease  Lab Results   Component Value Date    EGFR 34 03/30/2021    EGFR 36 03/04/2021    EGFR 36 12/14/2020    CREATININE 1 42 (H) 03/30/2021    CREATININE 1 37 (H) 03/04/2021    CREATININE 1 38 (H) 12/14/2020   Will recheck and call with results  Dysthymia  Doing very well on current meds  Stay on mirtazapine  Visit Diagnosis:  Diagnoses and all orders for this visit:    Dysthymia    Late onset Alzheimer's disease without behavioral disturbance ([facility] [address] )    Benign hypertension  -     Comprehensive metabolic panel; Future    Stage 3b chronic kidney disease    Compression fracture of L1 vertebra with delayed healing, subsequent encounter  Comments: Will get dexa scan and call with results  Orders:  -     DXA bone density spine hip and pelvis; Future  -     Vitamin D 25 hydroxy; Future          Subjective:    Patient ID: Otis Mcnally is a 78 y o  female  Pt is here for routine f/u appt  Not seen since December  Feels she is doing well  Pt was seen by Urgent care and had xrays and told she has a compression fx of L1 endplate  No h/o recent injury  Then had ct scan which confirmed the fx new since 2019  Feels she has no complaints  Step daughter feels the memory is "a little cloudy and slower"  Has a f/u appt with neuro on the 20th of this month with Dr Diane Clifton  Takes all other meds as directed  No side effects noted  Patient did have problems when she was on Aricept with her stomach  Pt is down 2 lbs since the last visit  Checks her bp's at home with home health  Reports last week it was up, but now the bp is normal at home         The following portions of the patient's history were reviewed and updated as appropriate: allergies, current medications, past family history, past medical history, past social history, past surgical history and problem list     Review of Systems   Constitutional: Negative for chills, diaphoresis, fatigue and fever  HENT: Negative  Eyes: Negative  Respiratory: Negative for cough, shortness of breath and wheezing  Cardiovascular: Negative for chest pain, palpitations and leg swelling  Gastrointestinal: Negative for abdominal pain, constipation, diarrhea, nausea and vomiting  Genitourinary: Negative for dysuria, frequency and urgency  Mild urinary incontinence  Musculoskeletal: Positive for back pain  Negative for arthralgias and myalgias  Neurological: Negative for dizziness, light-headedness and headaches  Psychiatric/Behavioral: Negative for dysphoric mood  The patient is not nervous/anxious  /67 (BP Location: Left arm, Patient Position: Sitting)   Pulse 63   Resp 16   Ht 5' 1 5" (1 562 m)   Wt 51 9 kg (114 lb 6 4 oz)   LMP  (LMP Unknown)   SpO2 93%   BMI 21 27 kg/m²   Social History     Socioeconomic History    Marital status:      Spouse name: Not on file    Number of children: Not on file    Years of education: Not on file    Highest education level: Not on file   Occupational History    Not on file   Social Needs    Financial resource strain: Not on file    Food insecurity     Worry: Not on file     Inability: Not on file    Transportation needs     Medical: Not on file     Non-medical: Not on file   Tobacco Use    Smoking status: Never Smoker    Smokeless tobacco: Never Used   Substance and Sexual Activity    Alcohol use:  Yes     Alcohol/week: 7 0 standard drinks     Types: 7 Glasses of wine per week     Frequency: Monthly or less     Comment: socially     Drug use: No    Sexual activity: Never   Lifestyle    Physical activity     Days per week: Not on file     Minutes per session: Not on file    Stress: Not on file   Relationships    Social connections     Talks on phone: Not on file     Gets together: Not on file     Attends [employer] service: Not on file     Active member of club or organization: Not on file     Attends meetings of clubs or organizations: Not on file     Relationship status: Not on file    Intimate partner violence     Fear of current or ex partner: Not on file     Emotionally abused: Not on file     Physically abused: Not on file     Forced sexual activity: Not on file   Other Topics Concern    Not on file   Social History Narrative    Not on file     Past Medical History:   Diagnosis Date    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     Aortic aneurysm ([facility] [address] )     4 1 cm on December 6, 2018    Chronic kidney disease     CKD (chronic kidney disease) stage 2, GFR 60-89 ml/min     Colon polyps     Dementia ([facility] [address] )     Hyperlipidemia     Hypertension     Insomnia     Memory loss     Osteopenia with elevated frax score     Osteoporosis     Tubular adenoma of colon 04/2019    Uterine fibroid      Family History   Problem Relation Age of Onset    Cancer Mother     Cancer Father      Past Surgical History:   Procedure Laterality Date    COLONOSCOPY  4838    date not certain     FOOT SURGERY      [place] COLONOSCOPY FLX DX W/COLLJ SPEC WHEN PFRMD N/A 4/24/2019    Procedure: COLONOSCOPY;  Surgeon: Deysi Latif MD;  [facility]; Service: Gastroenterology    [place] ESOPHAGOGASTRODUODENOSCOPY TRANSORAL DIAGNOSTIC N/A 4/24/2019    Procedure: ESOPHAGOGASTRODUODENOSCOPY (EGD); Surgeon: Deysi Latif MD;  Location: [facility];   Service: Gastroenterology       Current Outpatient Medications:     aspirin 81 MG tablet, Take 1 tablet (81 mg total) by mouth daily, Disp: , Rfl:     lisinopril (ZESTRIL) 5 mg tablet, TAKE 1 TABLET DAILY, Disp: 90 tablet, Rfl: 0    memantine (NAMENDA) 10 mg tablet, Take 1 tablet (10 mg total) by mouth 2 (two) times a day, Disp: 180 tablet, Rfl: 1    mirtazapine (REMERON) 15 mg tablet, TAKE ONE TABLET BY MOUTH EVERY DAY AT BEDTIME, Disp: 30 tablet, Rfl: 5    pantoprazole (PROTONIX) 40 mg tablet, TAKE ONE TABLET BY MOUTH EVERY DAY, Disp: 90 tablet, Rfl: 1    Allergies   Allergen Reactions    Cefazolin      Other reaction(s): Unknown          Lab Review   Appointment on 03/30/2021   Component Date Value    Sodium 03/30/2021 138     Potassium 03/30/2021 3 6     Chloride 03/30/2021 102     CO2 03/30/2021 26     ANION GAP 03/30/2021 10     BUN 03/30/2021 19     Creatinine 03/30/2021 1 42*    Glucose, Fasting 03/30/2021 91     Calcium 03/30/2021 8 5     eGFR 03/30/2021 34     WBC 03/30/2021 6 92     RBC 03/30/2021 3 45*    Hemoglobin 03/30/2021 11 0*    Hematocrit 03/30/2021 33 8*    MCV 03/30/2021 98     MCH 03/30/2021 31 9     MCHC 03/30/2021 32 5     RDW 03/30/2021 12 2     MPV 03/30/2021 10 2     Platelets 91/91/8296 373     nRBC 03/30/2021 0     Neutrophils Relative 03/30/2021 67     Immat GRANS % 03/30/2021 0     Lymphocytes Relative 03/30/2021 21     Monocytes Relative 03/30/2021 7     Eosinophils Relative 03/30/2021 3     Basophils Relative 03/30/2021 2*    Neutrophils Absolute 03/30/2021 4 63     Immature Grans Absolute 03/30/2021 0 02     Lymphocytes Absolute 03/30/2021 1 44     Monocytes Absolute 03/30/2021 0 50     Eosinophils Absolute 03/30/2021 0 21     Basophils Absolute 03/30/2021 0 12*    Phosphorus 03/30/2021 3 1     Creatinine, Ur 03/30/2021 21 7     Protein Urine Random 03/30/2021 <6     Prot/Creat Ratio, Ur 03/30/2021 <0 28*    PTH 03/30/2021 51 9     Vit D, 25-Hydroxy 03/30/2021 23 1*    Color, UA 03/30/2021 Yellow     Clarity, UA 03/30/2021 Clear     Specific Gravity, UA 03/30/2021 <=1 005     pH, UA 03/30/2021 5 5     Leukocytes, UA 03/30/2021 Negative     Nitrite, UA 03/30/2021 Negative     Protein, UA 03/30/2021 Negative     Glucose, UA 03/30/2021 Negative     Ketones, UA 03/30/2021 Negative     Urobilinogen, UA 03/30/2021 0 2     Bilirubin, UA 03/30/2021 Negative     Blood, UA 03/30/2021 Negative    Lab Requisition on 03/17/2021   Component Date Value    SARS-CoV-2 03/17/2021 Negative    Lab Requisition on 03/10/2021   Component Date Value    SARS-CoV-2 03/10/2021 Negative    Admission on 03/04/2021, Discharged on 03/04/2021   Component Date Value    SARS-CoV-2 03/04/2021 Negative     INFLUENZA A PCR 03/04/2021 Negative     INFLUENZA B PCR 03/04/2021 Negative     RSV PCR 03/04/2021 Negative     WBC 03/04/2021 8 07     RBC 03/04/2021 3 88     Hemoglobin 03/04/2021 12 1     Hematocrit 03/04/2021 38 4     MCV 03/04/2021 99*    MCH 03/04/2021 31 2     MCHC 03/04/2021 31 5     RDW 03/04/2021 13 3     MPV 03/04/2021 9 2     Platelets 91/12/8071 373     nRBC 03/04/2021 0     Neutrophils Relative 03/04/2021 58     Immat GRANS % 03/04/2021 1     Lymphocytes Relative 03/04/2021 28     Monocytes Relative 03/04/2021 8     Eosinophils Relative 03/04/2021 3     Basophils Relative 03/04/2021 2*    Neutrophils Absolute 03/04/2021 4 82     Immature Grans Absolute 03/04/2021 0 04     Lymphocytes Absolute 03/04/2021 2 26     Monocytes Absolute 03/04/2021 0 61     Eosinophils Absolute 03/04/2021 0 21     Basophils Absolute 03/04/2021 0 13*    Sodium 03/04/2021 143     Potassium 03/04/2021 3 9     Chloride 03/04/2021 107     CO2 03/04/2021 24     ANION GAP 03/04/2021 12     BUN 03/04/2021 23     Creatinine 03/04/2021 1 37*    Glucose 03/04/2021 92     Calcium 03/04/2021 9 7     eGFR 03/04/2021 36     Total Bilirubin 03/04/2021 0 60     Bilirubin, Direct 03/04/2021 0 13     Alkaline Phosphatase 03/04/2021 91     AST 03/04/2021 26     ALT 03/04/2021 26     Total Protein 03/04/2021 7 9     Albumin 03/04/2021 4 0         Imaging: Ct Chest Wo Contrast    Result Date: 4/2/2021  Narrative: CT CHEST WITHOUT IV CONTRAST INDICATION:   I71 2: Thoracic aortic aneurysm, without rupture   COMPARISON: 12/5/2018; echocardiogram from 9/21/2020; x-ray from 2/20/2021 TECHNIQUE: CT examination of the chest was performed without intravenous contrast   Axial, sagittal, and coronal 2D reformatted images were created from the source data and submitted for interpretation  Radiation dose length product (DLP) for this visit:  217 mGy-cm   This examination, like all CT scans performed in the [facility], was performed utilizing techniques to minimize radiation dose exposure, including the use of iterative reconstruction and automated exposure control  FINDINGS: LUNGS:  Pleural parenchymal scarring at the apices are unchanged  A few scattered reticular opacities are subpleural in location in the upper lobes similar to the prior exam  Calcified granuloma abuts the left major fissure  3 mm left lower lobe nodule image 71, series 3, stable  PLEURA:  Unremarkable  HEART/GREAT VESSELS:  The aortic root measures 3 7 cm on coronal reconstruction  The ascending aorta measures 4 cm at the level of the right pulmonary artery without significant change  The aortic arch measures 3 1 cm  The descending aorta measures 2 9 cm at the level of the left pulmonary artery  MEDIASTINUM AND REMEDIOS:  Unremarkable  CHEST WALL AND LOWER NECK:   Small 7 mm nodule in the right thyroid is identified  VISUALIZED STRUCTURES IN THE UPPER ABDOMEN:  Unremarkable  OSSEOUS STRUCTURES:  Compression fracture involves L1 which is new since 2018  There is mild protrusion of the bony cortex into the spinal canal posteriorly  Impression: 4 1 cm ascending aortic aneurysm Compression fracture of L1 is new since CT from 2019 but noted on recent x-ray from 2/28/2021  There appears to be further loss of height of vertebral body suggesting this fracture is recent   Workstation performed: [id number]     Us Kidney And Bladder    Result Date: 4/3/2021  Narrative: RENAL ULTRASOUND INDICATION:   N18 30: Chronic kidney disease, stage 3 unspecified N18 32: Chronic kidney disease, stage 3b N18 9: Chronic kidney disease, unspecified E83 9: Disorder of mineral metabolism, unspecified M89 9: Disorder of bone, unspecified  COMPARISON: CT abdomen pelvis 3/27/2019 TECHNIQUE:   Ultrasound of the retroperitoneum was performed with a curvilinear transducer utilizing volumetric sweeps and still imaging techniques  FINDINGS: KIDNEYS: There is asymmetric left renal atrophy with measurements below  Right kidney:  9 3 x 3 6 x 3 7 cm  Left kidney:  7 5 x 3 5 x 4 1 cm  Right kidney The renal parenchyma is diffusely echogenic consistent with medical renal disease  No suspicious masses detected  No hydronephrosis  No shadowing calculi  No perinephric fluid collections  Left kidney The renal parenchyma is diffusely echogenic consistent with medical renal disease  No suspicious masses detected  No hydronephrosis  No shadowing calculi  No perinephric fluid collections  URETERS: Nonvisualized  BLADDER: Normally distended  No focal thickening or mass lesions  Bilateral ureteral jets detected  Impression: Echogenic kidneys with mild left renal atrophy  No hydronephrosis  Workstation performed: [id number]       Objective:     Physical Exam  Vitals signs and nursing note reviewed  Constitutional:       General: She is not in acute distress  Appearance: Normal appearance  She is well-developed  She is not ill-appearing, toxic-appearing or diaphoretic  HENT:      Head: Normocephalic and atraumatic  Right Ear: Tympanic membrane, ear canal and external ear normal  There is no impacted cerumen  Left Ear: Tympanic membrane, ear canal and external ear normal  There is no impacted cerumen  Nose: Nose normal  No congestion  Mouth/Throat:      Mouth: Mucous membranes are moist       Pharynx: No oropharyngeal exudate  Eyes:      General:         Right eye: No discharge  Left eye: No discharge  Extraocular Movements: Extraocular movements intact  Conjunctiva/sclera: Conjunctivae normal       Pupils: Pupils are equal, round, and reactive to light  Neck:      Musculoskeletal: Normal range of motion and neck supple  No neck rigidity  Cardiovascular:      Rate and Rhythm: Normal rate and regular rhythm  Heart sounds: No murmur  Pulmonary:      Effort: Pulmonary effort is normal  No respiratory distress  Breath sounds: Normal breath sounds  Musculoskeletal: Normal range of motion  General: No deformity  Right lower leg: No edema  Left lower leg: No edema  Skin:     General: Skin is warm  Capillary Refill: Capillary refill takes less than 2 seconds  Coloration: Skin is not pale  Findings: No erythema  Neurological:      General: No focal deficit present  Mental Status: She is alert and oriented to person, place, and time  Psychiatric:         Mood and Affect: Mood normal          Behavior: Behavior normal          Thought Content: Thought content normal          Judgment: Judgment normal            Patient Instructions     Reviewed all with patient and her stepdaughter  We held on the alendronate in 2019 until a GI consult but now patient has a vertebral fracture  Will repeat a DEXA scan and I suspect at this point I will want to put her on Prolia which is injectable  Please make sure she is getting 1200 mg of calcium in her diet daily and she should be on over-the-counter vitamin D3 2000 units a day  Information given out on calcium  Have the labs done fasting I will call with results  Do not give her the vitamin D until after you hear from us with her level  Follow-up here in 3 months  [place] paperwork filled out for her [address] [place] [address] Road  Continue all current meds for now  We will no longer check a cholesterol panel as the family has decided they would prefer her not starting a statin at this time related to her age        DASIA Davies    Portions of the record may have been created with voice recognition software  Occasional wrong word or "sound a like" substitutions may have occurred due to the inherent limitations of voice recognition software  Read the chart carefully and recognize, using context, where substitutions have occurred  Depression Screening Follow-up Plan: Patient's depression screening was positive with a PHQ-2 score of 0  Their PHQ-9 score was 0  Clinically patient does not have depression  No treatment is required

## 2021-04-15 NOTE — ASSESSMENT & PLAN NOTE
Lab Results   Component Value Date    EGFR 34 03/30/2021    EGFR 36 03/04/2021    EGFR 36 12/14/2020    CREATININE 1 42 (H) 03/30/2021    CREATININE 1 37 (H) 03/04/2021    CREATININE 1 38 (H) 12/14/2020   Will recheck and call with results

## 2021-04-15 NOTE — PATIENT INSTRUCTIONS
Reviewed all with patient and her stepdaughter  We held on the alendronate in 2019 until a GI consult but now patient has a vertebral fracture  Will repeat a DEXA scan and I suspect at this point I will want to put her on Prolia which is injectable  Please make sure she is getting 1200 mg of calcium in her diet daily and she should be on over-the-counter vitamin D3 2000 units a day  Information given out on calcium  Have the labs done fasting I will call with results  Do not give her the vitamin D until after you hear from us with her level  Follow-up here in 3 months  FMLA paperwork filled out for her [address] [place] [address] Road  Continue all current meds for now  We will no longer check a cholesterol panel as the family has decided they would prefer her not starting a statin at this time related to her age

## 2021-04-19 ENCOUNTER — TELEPHONE (OUTPATIENT)
Dept: FAMILY MEDICINE CLINIC | Facility: CLINIC | Age: 81
End: 2021-04-19

## 2021-04-19 NOTE — TELEPHONE ENCOUNTER
Patients daughter called just to inform you that Rubi Armstrong used to see a doctor for her Osteoporosis  She used to take fosamax for it  She wanted to make you aware of this since she did not know this until today

## 2021-04-20 ENCOUNTER — OFFICE VISIT (OUTPATIENT)
Dept: NEUROLOGY | Facility: CLINIC | Age: 81
End: 2021-04-20
Payer: COMMERCIAL

## 2021-04-20 ENCOUNTER — APPOINTMENT (OUTPATIENT)
Dept: LAB | Facility: CLINIC | Age: 81
End: 2021-04-20
Payer: COMMERCIAL

## 2021-04-20 VITALS
SYSTOLIC BLOOD PRESSURE: 104 MMHG | RESPIRATION RATE: 16 BRPM | WEIGHT: 115 LBS | HEART RATE: 68 BPM | DIASTOLIC BLOOD PRESSURE: 72 MMHG | HEIGHT: 62 IN | BODY MASS INDEX: 21.16 KG/M2

## 2021-04-20 DIAGNOSIS — S32.010G COMPRESSION FRACTURE OF L1 VERTEBRA WITH DELAYED HEALING, SUBSEQUENT ENCOUNTER: ICD-10-CM

## 2021-04-20 DIAGNOSIS — G30.1 LATE ONSET ALZHEIMER'S DISEASE WITHOUT BEHAVIORAL DISTURBANCE (HCC): Primary | ICD-10-CM

## 2021-04-20 DIAGNOSIS — N18.9 CHRONIC KIDNEY DISEASE-MINERAL AND BONE DISORDER: ICD-10-CM

## 2021-04-20 DIAGNOSIS — F02.80 LATE ONSET ALZHEIMER'S DISEASE WITHOUT BEHAVIORAL DISTURBANCE (HCC): Primary | ICD-10-CM

## 2021-04-20 DIAGNOSIS — I10 BENIGN HYPERTENSION: ICD-10-CM

## 2021-04-20 DIAGNOSIS — E83.9 CHRONIC KIDNEY DISEASE-MINERAL AND BONE DISORDER: ICD-10-CM

## 2021-04-20 DIAGNOSIS — E23.6 PITUITARY MASS (HCC): ICD-10-CM

## 2021-04-20 DIAGNOSIS — M89.9 CHRONIC KIDNEY DISEASE-MINERAL AND BONE DISORDER: ICD-10-CM

## 2021-04-20 LAB
25(OH)D3 SERPL-MCNC: 24.1 NG/ML (ref 30–100)
ALBUMIN SERPL BCP-MCNC: 3.9 G/DL (ref 3.5–5)
ALP SERPL-CCNC: 91 U/L (ref 46–116)
ALT SERPL W P-5'-P-CCNC: 20 U/L (ref 12–78)
ANION GAP SERPL CALCULATED.3IONS-SCNC: 6 MMOL/L (ref 4–13)
AST SERPL W P-5'-P-CCNC: 17 U/L (ref 5–45)
BILIRUB SERPL-MCNC: 0.53 MG/DL (ref 0.2–1)
BUN SERPL-MCNC: 30 MG/DL (ref 5–25)
CALCIUM SERPL-MCNC: 9.8 MG/DL (ref 8.3–10.1)
CHLORIDE SERPL-SCNC: 106 MMOL/L (ref 100–108)
CO2 SERPL-SCNC: 26 MMOL/L (ref 21–32)
CREAT SERPL-MCNC: 1.4 MG/DL (ref 0.6–1.3)
GFR SERPL CREATININE-BSD FRML MDRD: 35 ML/MIN/1.73SQ M
GLUCOSE P FAST SERPL-MCNC: 94 MG/DL (ref 65–99)
POTASSIUM SERPL-SCNC: 4.2 MMOL/L (ref 3.5–5.3)
PROT SERPL-MCNC: 7.5 G/DL (ref 6.4–8.2)
SODIUM SERPL-SCNC: 138 MMOL/L (ref 136–145)

## 2021-04-20 PROCEDURE — 99214 OFFICE O/P EST MOD 30 MIN: CPT | Performed by: PSYCHIATRY & NEUROLOGY

## 2021-04-20 PROCEDURE — 80053 COMPREHEN METABOLIC PANEL: CPT

## 2021-04-20 PROCEDURE — 82306 VITAMIN D 25 HYDROXY: CPT

## 2021-04-20 PROCEDURE — 36415 COLL VENOUS BLD VENIPUNCTURE: CPT

## 2021-04-20 RX ORDER — MEMANTINE HYDROCHLORIDE 10 MG/1
10 TABLET ORAL 2 TIMES DAILY
Qty: 180 TABLET | Refills: 1 | Status: SHIPPED | OUTPATIENT
Start: 2021-04-20 | End: 2021-10-28

## 2021-04-20 NOTE — PROGRESS NOTES
Otis Mcnally is a 78 y o  female  Chief Complaint   Patient presents with    Alzheimer's Disease       Assessment:  1  Late onset Alzheimer's disease without behavioral disturbance ([address] )    2  Pituitary mass ([facility] [address] )    3  Benign hypertension    4  Chronic kidney disease-mineral and bone disorder        Plan:  Continue with Namenda 10 mg twice a day  continue with mentally stimulating exercises  referral to home health care  follow-up in 6 months  Discussion:   patient was advised to continue with Namenda 10 mg twice a day, continue with mentally stimulating exercises, she has not tolerated Aricept and Exelon in the past, she is in follow up with the neurosurgeon regarding her pituitary mass, she was advised to be under constant supervision and continue with mentally stimulating exercises referral was made to home health care to help her with mentally stimulating exercises, she is in follow up with her nephrologist regarding her renal insufficiency, to keep her blood pressure cholesterol and sugar under control, to go to the hospital if has any worsening symptoms and call me otherwise to see me back in 6 months and follow up with other physicians  Subjective:    HPI    patient is here in follow-up for her memory difficulty, she is accompanied with her son, since the last visit she tells me that she is doing good, she is able to do her ADLs, her appetite is good weight has been stable, denies any bowel and bladder incontinence, sleep is good no hallucinations, she recently had an LP 1 compression fracture but is recovering from that and denies having any pain, she said that she felt better when the home health care people were doing mentally stimulating exercises for her,  No other complaints      Vitals:    04/20/21 1248   BP: 104/72   BP Location: Left arm   Patient Position: Sitting   Cuff Size: Standard   Pulse: 68   Resp: 16   Weight: 52 2 kg (115 lb)   Height: 5' 1 5" (1 562 m)       Current Medications    Current Outpatient Medications:     aspirin 81 MG tablet, Take 1 tablet (81 mg total) by mouth daily, Disp: , Rfl:     lisinopril (ZESTRIL) 5 mg tablet, TAKE 1 TABLET DAILY, Disp: 90 tablet, Rfl: 0    memantine (NAMENDA) 10 mg tablet, Take 1 tablet (10 mg total) by mouth 2 (two) times a day, Disp: 180 tablet, Rfl: 1    mirtazapine (REMERON) 15 mg tablet, TAKE ONE TABLET BY MOUTH EVERY DAY AT BEDTIME, Disp: 30 tablet, Rfl: 5    pantoprazole (PROTONIX) 40 mg tablet, TAKE ONE TABLET BY MOUTH EVERY DAY, Disp: 90 tablet, Rfl: 1      Allergies  Cefazolin    Past Medical History  Past Medical History:   Diagnosis Date    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     Aortic aneurysm (HCC)     4 1 cm on December 6, 2018    Chronic kidney disease     CKD (chronic kidney disease) stage 2, GFR 60-89 ml/min     Colon polyps     Dementia (HCC)     Hyperlipidemia     Hypertension     Insomnia     Memory loss     Osteopenia with elevated frax score     Osteoporosis     Tubular adenoma of colon 04/2019    Uterine fibroid          Past Surgical History:  Past Surgical History:   Procedure Laterality Date    COLONOSCOPY  7468    date not certain     FOOT SURGERY      [place] COLONOSCOPY FLX DX W/COLLJ SPEC WHEN PFRMD N/A 4/24/2019    Procedure: COLONOSCOPY;  Surgeon: Gerber Vale MD;  Location: [facility]; Service: Gastroenterology    [place] ESOPHAGOGASTRODUODENOSCOPY TRANSORAL DIAGNOSTIC N/A 4/24/2019    Procedure: ESOPHAGOGASTRODUODENOSCOPY (EGD); Surgeon: Gerber Vale MD;  [facility]: [facility]; Service: Gastroenterology         Family History:  Family History   Problem Relation Age of Onset    Cancer Mother     Cancer Father        Social History:   reports that she has never smoked  She has never used smokeless tobacco  She reports current alcohol use of about 7 0 standard drinks of alcohol per week  She reports that she does not use drugs      I have reviewed the past medical history, surgical history, social and family history, current medications, allergies vitals, review of systems, and updated this information as appropriate today  Objective:    Physical Exam    Neurological Exam    GENERAL:  Cooperative in no acute distress  Well-developed and well-nourished    HEAD and NECK   Head is atraumatic normocephalic with no lesions or masses  Neck is supple with full range of motion    CARDIOVASCULAR  Carotid Arteries-no carotid bruits  NEUROLOGIC:  Mental Status-the patient is awake alert and oriented without aphasia or apraxia, [place] is 19/30  Cranial Nerves: Visual fields are full to confrontation  Extraocular movements are full without nystagmus  Pupils are 2-1/2 mm and reactive  Face is symmetrical to light touch  Movements of facial expression move symmetrically  Hearing is normal to finger rub bilaterally  Soft palate lifts symmetrically  Shoulder shrug is symmetrical  Tongue is midline without atrophy  Motor: No drift is noted on arm extension  Strength is full in the upper and lower extremities with normal bulk and tone  Coordination: Finger to nose testing is performed accurately  Gait reveals a normal base with symmetrical arm swing  ROS:  Review of Systems   Constitutional: Negative  Negative for appetite change and fever  HENT: Negative  Negative for hearing loss, tinnitus, trouble swallowing and voice change  Eyes: Negative  Negative for photophobia and pain  Respiratory: Negative  Negative for shortness of breath  Cardiovascular: Negative  Negative for palpitations  Gastrointestinal: Negative  Negative for nausea and vomiting  Endocrine: Negative  Negative for cold intolerance  Genitourinary: Negative  Negative for dysuria, frequency and urgency  Musculoskeletal: Negative  Negative for myalgias and neck pain  Skin: Negative  Negative for rash  Neurological: Negative    Negative for dizziness, tremors, seizures, syncope, facial asymmetry, speech difficulty, weakness, light-headedness, numbness and headaches  Hematological: Negative  Does not bruise/bleed easily  Psychiatric/Behavioral: Positive for confusion and decreased concentration  Negative for hallucinations and sleep disturbance

## 2021-04-22 ENCOUNTER — TELEPHONE (OUTPATIENT)
Dept: FAMILY MEDICINE CLINIC | Facility: CLINIC | Age: 81
End: 2021-04-22

## 2021-04-22 DIAGNOSIS — I10 BENIGN HYPERTENSION: ICD-10-CM

## 2021-04-22 RX ORDER — LISINOPRIL 5 MG/1
TABLET ORAL
Qty: 90 TABLET | Refills: 0 | Status: SHIPPED | OUTPATIENT
Start: 2021-04-22 | End: 2021-07-30

## 2021-04-22 NOTE — TELEPHONE ENCOUNTER
----- Message from [address] [place] [address]  sent at 4/22/2021  7:40 AM EDT -----  Can you confirm if she is on vitamin d3? If she is not she needs to be on 2000 units daily  If she is, she needs to add by 2000 units  Please add to med list when you find out which dose she will be on   Ty

## 2021-04-22 NOTE — TELEPHONE ENCOUNTER
Spoke with Manuel Davila and she said no she is not on vitamin D supplement  I let her know to start her on D3 2000 units daily       She also wanted to make you aware which she did not know that Jazmin Carrillo was on Fosamax for 10 years in the past  She is scheduled for her DXA on May 13th

## 2021-04-26 ENCOUNTER — TELEPHONE (OUTPATIENT)
Dept: FAMILY MEDICINE CLINIC | Facility: CLINIC | Age: 81
End: 2021-04-26

## 2021-04-26 NOTE — TELEPHONE ENCOUNTER
Cher from [facility] called  She said that there was an report filed for patient that she was being mistreated  An anoymous person called making a complaint against [facility] saying that patient was taking her med's wrong  [facility] said she only took two of her pm pills in the am and was a little cloudy but other then that she was okay when they went to the house  She was well dressed and the house was very clean  They just was calling to see if we had any concerns about her being ,miss treated  They did say that this only happened once  She did ask if we can fax over the last three notes

## 2021-05-01 DIAGNOSIS — R11.2 NAUSEA & VOMITING: ICD-10-CM

## 2021-05-03 RX ORDER — PANTOPRAZOLE SODIUM 40 MG/1
TABLET, DELAYED RELEASE ORAL
Qty: 90 TABLET | Refills: 1 | Status: SHIPPED | OUTPATIENT
Start: 2021-05-03 | End: 2021-10-28

## 2021-05-12 ENCOUNTER — HOSPITAL ENCOUNTER (OUTPATIENT)
Dept: MAMMOGRAPHY | Facility: CLINIC | Age: 81
Discharge: HOME | End: 2021-05-12
Payer: COMMERCIAL

## 2021-05-12 DIAGNOSIS — S32.010G COMPRESSION FRACTURE OF L1 VERTEBRA WITH DELAYED HEALING, SUBSEQUENT ENCOUNTER: ICD-10-CM

## 2021-05-12 PROCEDURE — 77080 DXA BONE DENSITY AXIAL: CPT

## 2021-05-13 ENCOUNTER — TELEPHONE (OUTPATIENT)
Dept: NEPHROLOGY | Facility: CLINIC | Age: 81
End: 2021-05-13

## 2021-05-13 ENCOUNTER — IMMUNIZATIONS (OUTPATIENT)
Dept: FAMILY MEDICINE CLINIC | Facility: HOSPITAL | Age: 81
End: 2021-05-13

## 2021-05-13 DIAGNOSIS — Z23 ENCOUNTER FOR IMMUNIZATION: Primary | ICD-10-CM

## 2021-05-13 PROCEDURE — 0001A SARS-COV-2 / COVID-19 MRNA VACCINE (PFIZER-BIONTECH) 30 MCG: CPT

## 2021-05-13 PROCEDURE — 91300 SARS-COV-2 / COVID-19 MRNA VACCINE (PFIZER-BIONTECH) 30 MCG: CPT

## 2021-05-13 NOTE — TELEPHONE ENCOUNTER
Appointment was confirmed and went over registration details with patient step-daughter Annabel Upton   Dora Alonzo,

## 2021-05-14 ENCOUNTER — OFFICE VISIT (OUTPATIENT)
Dept: NEPHROLOGY | Facility: CLINIC | Age: 81
End: 2021-05-14
Payer: COMMERCIAL

## 2021-05-14 ENCOUNTER — TELEPHONE (OUTPATIENT)
Dept: FAMILY MEDICINE CLINIC | Facility: CLINIC | Age: 81
End: 2021-05-14

## 2021-05-14 VITALS
WEIGHT: 117 LBS | TEMPERATURE: 96.9 F | HEART RATE: 68 BPM | SYSTOLIC BLOOD PRESSURE: 110 MMHG | HEIGHT: 62 IN | DIASTOLIC BLOOD PRESSURE: 70 MMHG | RESPIRATION RATE: 16 BRPM | BODY MASS INDEX: 21.53 KG/M2

## 2021-05-14 DIAGNOSIS — S32.010G COMPRESSION FRACTURE OF L1 VERTEBRA WITH DELAYED HEALING, SUBSEQUENT ENCOUNTER: Primary | ICD-10-CM

## 2021-05-14 DIAGNOSIS — N18.9 CHRONIC KIDNEY DISEASE-MINERAL AND BONE DISORDER: ICD-10-CM

## 2021-05-14 DIAGNOSIS — E83.9 CHRONIC KIDNEY DISEASE-MINERAL AND BONE DISORDER: ICD-10-CM

## 2021-05-14 DIAGNOSIS — N18.32 STAGE 3B CHRONIC KIDNEY DISEASE (HCC): Primary | ICD-10-CM

## 2021-05-14 DIAGNOSIS — F02.80 LATE ONSET ALZHEIMER'S DEMENTIA WITHOUT BEHAVIORAL DISTURBANCE (HCC): ICD-10-CM

## 2021-05-14 DIAGNOSIS — M89.9 CHRONIC KIDNEY DISEASE-MINERAL AND BONE DISORDER: ICD-10-CM

## 2021-05-14 DIAGNOSIS — M85.80 OSTEOPENIA, UNSPECIFIED LOCATION: ICD-10-CM

## 2021-05-14 DIAGNOSIS — I10 BENIGN HYPERTENSION: ICD-10-CM

## 2021-05-14 DIAGNOSIS — G30.1 LATE ONSET ALZHEIMER'S DEMENTIA WITHOUT BEHAVIORAL DISTURBANCE (HCC): ICD-10-CM

## 2021-05-14 PROCEDURE — 99214 OFFICE O/P EST MOD 30 MIN: CPT | Performed by: INTERNAL MEDICINE

## 2021-05-14 RX ORDER — ALENDRONATE SODIUM 70 MG/1
70 TABLET ORAL
Qty: 4 TABLET | Refills: 5 | Status: SHIPPED | OUTPATIENT
Start: 2021-05-14 | End: 2021-11-11

## 2021-05-14 RX ORDER — MELATONIN
2000 DAILY
COMMUNITY

## 2021-05-14 NOTE — PATIENT INSTRUCTIONS

## 2021-05-14 NOTE — ASSESSMENT & PLAN NOTE
Lab Results   Component Value Date    EGFR 35 04/20/2021    EGFR 34 03/30/2021    EGFR 36 03/04/2021    CREATININE 1 40 (H) 04/20/2021    CREATININE 1 42 (H) 03/30/2021    CREATININE 1 37 (H) 03/04/2021    PTH and phosphorus along with vitamin-D are within acceptable range and will continue to monitor as part of the CKD management

## 2021-05-14 NOTE — PROGRESS NOTES
NEPHROLOGY OFFICE FOLLOW UP  Hammad Christine 78 y o  female MRN: [id number]    Encounter: [id number] 5/14/2021    REASON FOR VISIT: Hammad Christine is a 78 y o  female who is here on 5/14/2021 for Chronic Kidney Disease and Follow-up    HPI:     Dianne Chopra in today for follow-up of CKD  [de-identified] year woman with history of hypertension and dementia who also has CKD  She is doing well overall  In between she was in emergency room with the hip pain  No other acute complaint     No chest pain no palpitation or shortness of breath     No nausea no vomiting      REVIEW OF SYSTEMS:    Review of Systems   Constitutional: Negative for activity change and fatigue  HENT: Negative for congestion and ear discharge  Eyes: Negative for photophobia and pain  Respiratory: Negative for apnea and choking  Cardiovascular: Negative for chest pain and palpitations  Gastrointestinal: Negative for abdominal distention and blood in stool  Endocrine: Negative for heat intolerance and polyphagia  Genitourinary: Negative for flank pain and urgency  Musculoskeletal: Negative for neck pain and neck stiffness  Skin: Negative for color change and wound  Allergic/Immunologic: Negative for food allergies and immunocompromised state  Neurological: Negative for seizures and facial asymmetry  Hematological: Negative for adenopathy  Does not bruise/bleed easily  Psychiatric/Behavioral: Negative for self-injury and suicidal ideas           PAST MEDICAL HISTORY:  Past Medical History:   Diagnosis Date    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     Aortic aneurysm (HCC)     4 1 cm on December 6, 2018    Chronic kidney disease     CKD (chronic kidney disease) stage 2, GFR 60-89 ml/min     Colon polyps     Dementia (HCC)     Hyperlipidemia     Hypertension     Insomnia     Memory loss     Osteopenia with elevated frax score     Osteoporosis     Tubular adenoma of colon 04/2019    Uterine fibroid        PAST SURGICAL HISTORY:  Past Surgical History:   Procedure Laterality Date    COLONOSCOPY  5811    date not certain     FOOT SURGERY      MS COLONOSCOPY FLX DX W/COLLJ SPEC WHEN PFRMD N/A 4/24/2019    Procedure: COLONOSCOPY;  Surgeon: Rosa Hodges MD;  Location: [facility]; Service: Gastroenterology    MS ESOPHAGOGASTRODUODENOSCOPY TRANSORAL DIAGNOSTIC N/A 4/24/2019    Procedure: ESOPHAGOGASTRODUODENOSCOPY (EGD); Surgeon: Rosa Hodges MD;  Location: [facility];   Service: Gastroenterology       SOCIAL HISTORY:  Social History     Substance and Sexual Activity   Alcohol Use Yes    Alcohol/week: 7 0 standard drinks    Types: 7 Glasses of wine per week    Frequency: Monthly or less    Drinks per session: 1 or 2    Binge frequency: Never    Comment: socially      Social History     Substance and Sexual Activity   Drug Use Never     Social History     Tobacco Use   Smoking Status Never Smoker   Smokeless Tobacco Never Used       FAMILY HISTORY:  Family History   Problem Relation Age of Onset    Cancer Mother     Cancer Father        MEDICATIONS:    Current Outpatient Medications:     aspirin 81 MG tablet, Take 1 tablet (81 mg total) by mouth daily, Disp: , Rfl:     cholecalciferol (VITAMIN D3) 1,000 units tablet, Take 2,000 Units by mouth daily, Disp: , Rfl:     lisinopril (ZESTRIL) 5 mg tablet, TAKE ONE TABLET BY MOUTH EVERY DAY, Disp: 90 tablet, Rfl: 0    memantine (NAMENDA) 10 mg tablet, Take 1 tablet (10 mg total) by mouth 2 (two) times a day, Disp: 180 tablet, Rfl: 1    mirtazapine (REMERON) 15 mg tablet, TAKE ONE TABLET BY MOUTH EVERY DAY AT BEDTIME, Disp: 30 tablet, Rfl: 5    pantoprazole (PROTONIX) 40 mg tablet, TAKE ONE TABLET BY MOUTH EVERY DAY, Disp: 90 tablet, Rfl: 1    PHYSICAL EXAM:  Vitals:    05/14/21 1002   BP: 110/70   BP Location: Right arm   Patient Position: Sitting   Pulse: 68   Resp: 16   Temp: (!) 96 9 °F (36 1 °C)   TempSrc: Temporal   Weight: 53 1 kg (117 lb)   Height: 5' 2" (1 575 m)     Body mass index is 21 4 kg/m²  Physical Exam  Constitutional:       General: She is not in acute distress  Appearance: She is well-developed  HENT:      Head: Normocephalic  Eyes:      General: No scleral icterus  Conjunctiva/sclera: Conjunctivae normal    Neck:      Musculoskeletal: Normal range of motion and neck supple  Vascular: No JVD  Cardiovascular:      Rate and Rhythm: Normal rate and regular rhythm  Heart sounds: Normal heart sounds  Pulmonary:      Effort: Pulmonary effort is normal       Breath sounds: Normal breath sounds  No wheezing  Abdominal:      General: Bowel sounds are normal       Palpations: Abdomen is soft  Tenderness: There is no abdominal tenderness  Musculoskeletal: Normal range of motion  Skin:     General: Skin is warm  Findings: No rash  Neurological:      General: No focal deficit present  Mental Status: She is alert and oriented to person, place, and time     Psychiatric:         Behavior: Behavior normal          LAB RESULTS:  Results for orders placed or performed in visit on 04/20/21   Comprehensive metabolic panel   Result Value Ref Range    Sodium 138 136 - 145 mmol/L    Potassium 4 2 3 5 - 5 3 mmol/L    Chloride 106 100 - 108 mmol/L    CO2 26 21 - 32 mmol/L    ANION GAP 6 4 - 13 mmol/L    BUN 30 (H) 5 - 25 mg/dL    Creatinine 1 40 (H) 0 60 - 1 30 mg/dL    Glucose, Fasting 94 65 - 99 mg/dL    Calcium 9 8 8 3 - 10 1 mg/dL    AST 17 5 - 45 U/L    ALT 20 12 - 78 U/L    Alkaline Phosphatase 91 46 - 116 U/L    Total Protein 7 5 6 4 - 8 2 g/dL    Albumin 3 9 3 5 - 5 0 g/dL    Total Bilirubin 0 53 0 20 - 1 00 mg/dL    eGFR 35 ml/min/1 73sq m   Vitamin D 25 hydroxy   Result Value Ref Range    Vit D, 25-Hydroxy 24 1 (L) 30 0 - 100 0 ng/mL       ASSESSMENT and PLAN:      Stage 3b chronic kidney disease  Lab Results   Component Value Date    EGFR 35 04/20/2021    EGFR 34 03/30/2021    EGFR 36 03/04/2021    CREATININE 1 40 (H) 04/20/2021    CREATININE 1 42 (H) 03/30/2021    CREATININE 1 37 (H) 03/04/2021    patient does have CKD stage 3  He it is stable overall  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 is hypertension  Patient is on ACE-inhibitor which I will continue     Asymptomatic and progressive nature of kidney disease discussed with her  Advised hydration and avoiding any nephrotoxic medicine    Chronic kidney disease-mineral and bone disorder  Lab Results   Component Value Date    EGFR 35 04/20/2021    EGFR 34 03/30/2021    EGFR 36 03/04/2021    CREATININE 1 40 (H) 04/20/2021    CREATININE 1 42 (H) 03/30/2021    CREATININE 1 37 (H) 03/04/2021    PTH and phosphorus along with vitamin-D are within acceptable range and will continue to monitor as part of the CKD management    Benign hypertension   Very well controlled with present medication which I will continue    Dementia without behavioral disturbance   Overall stable on medication       she is stable overall  High discuss lab work and kidney ultrasound with her and her   I will see her back in 6 months  Will get blood and urine test before that visit        Portions of the record may have been created with voice recognition software  Occasional wrong word or "sound a like" substitutions may have occurred due to the inherent limitations of voice recognition software  Read the chart carefully and recognize, using context, where substitutions have occurred  If you have any questions, please contact the dictating provider

## 2021-05-14 NOTE — ASSESSMENT & PLAN NOTE
Lab Results   Component Value Date    EGFR 35 04/20/2021    EGFR 34 03/30/2021    EGFR 36 03/04/2021    CREATININE 1 40 (H) 04/20/2021    CREATININE 1 42 (H) 03/30/2021    CREATININE 1 37 (H) 03/04/2021    patient does have CKD stage 3  He it is stable overall  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 is hypertension  Patient is on ACE-inhibitor which I will continue     Asymptomatic and progressive nature of kidney disease discussed with her    Advised hydration and avoiding any nephrotoxic medicine

## 2021-05-14 NOTE — TELEPHONE ENCOUNTER
Let her know I called it in  Take 1 pill the same day of the week, early in the am with no other meds and a full glass of water  She cannot lie down for at least 1 hour after this med  Let me know if she gets any stomach discomfort

## 2021-05-18 NOTE — TELEPHONE ENCOUNTER
Spoke with patient and she is aware and will do   She hasn't heard anything and her step brother picked up the medicine

## 2021-06-04 ENCOUNTER — IMMUNIZATIONS (OUTPATIENT)
Dept: FAMILY MEDICINE CLINIC | Facility: HOSPITAL | Age: 81
End: 2021-06-04

## 2021-06-04 DIAGNOSIS — Z23 ENCOUNTER FOR IMMUNIZATION: Primary | ICD-10-CM

## 2021-06-04 PROCEDURE — 91300 SARS-COV-2 / COVID-19 MRNA VACCINE (PFIZER-BIONTECH) 30 MCG: CPT

## 2021-06-04 PROCEDURE — 0002A SARS-COV-2 / COVID-19 MRNA VACCINE (PFIZER-BIONTECH) 30 MCG: CPT

## 2021-07-30 DIAGNOSIS — I10 BENIGN HYPERTENSION: ICD-10-CM

## 2021-07-30 RX ORDER — LISINOPRIL 5 MG/1
TABLET ORAL
Qty: 90 TABLET | Refills: 0 | Status: SHIPPED | OUTPATIENT
Start: 2021-07-30 | End: 2021-10-21 | Stop reason: ALTCHOICE

## 2021-08-10 ENCOUNTER — RA CDI HCC (OUTPATIENT)
Dept: OTHER | Facility: HOSPITAL | Age: 81
End: 2021-08-10

## 2021-08-10 NOTE — PROGRESS NOTES
[facility]  coding opportunities          Chart reviewed, no opportunity found: CHART REVIEWED, NO OPPORTUNITY FOUND                     Patients insurance company: [employer]

## 2021-08-16 ENCOUNTER — OFFICE VISIT (OUTPATIENT)
Dept: FAMILY MEDICINE CLINIC | Facility: CLINIC | Age: 81
End: 2021-08-16
Payer: COMMERCIAL

## 2021-08-16 VITALS
RESPIRATION RATE: 16 BRPM | HEIGHT: 62 IN | TEMPERATURE: 97.3 F | HEART RATE: 78 BPM | BODY MASS INDEX: 22.08 KG/M2 | DIASTOLIC BLOOD PRESSURE: 66 MMHG | WEIGHT: 120 LBS | OXYGEN SATURATION: 98 % | SYSTOLIC BLOOD PRESSURE: 108 MMHG

## 2021-08-16 DIAGNOSIS — Z12.12 SCREENING FOR COLORECTAL CANCER: ICD-10-CM

## 2021-08-16 DIAGNOSIS — E23.6 PITUITARY MASS (HCC): ICD-10-CM

## 2021-08-16 DIAGNOSIS — N18.32 STAGE 3B CHRONIC KIDNEY DISEASE (HCC): ICD-10-CM

## 2021-08-16 DIAGNOSIS — I10 BENIGN HYPERTENSION: ICD-10-CM

## 2021-08-16 DIAGNOSIS — Z12.11 SCREENING FOR COLORECTAL CANCER: ICD-10-CM

## 2021-08-16 DIAGNOSIS — Z12.31 ENCOUNTER FOR SCREENING MAMMOGRAM FOR BREAST CANCER: ICD-10-CM

## 2021-08-16 DIAGNOSIS — Z00.00 MEDICARE ANNUAL WELLNESS VISIT, SUBSEQUENT: ICD-10-CM

## 2021-08-16 DIAGNOSIS — I71.2 ASCENDING AORTIC ANEURYSM (HCC): ICD-10-CM

## 2021-08-16 DIAGNOSIS — I35.8 AORTIC VALVE SCLEROSIS: ICD-10-CM

## 2021-08-16 PROBLEM — N18.4 CKD (CHRONIC KIDNEY DISEASE) STAGE 4, GFR 15-29 ML/MIN (HCC): Status: ACTIVE | Noted: 2021-08-16

## 2021-08-16 PROCEDURE — 99214 OFFICE O/P EST MOD 30 MIN: CPT | Performed by: FAMILY MEDICINE

## 2021-08-16 PROCEDURE — G0439 PPPS, SUBSEQ VISIT: HCPCS | Performed by: FAMILY MEDICINE

## 2021-08-16 NOTE — PATIENT INSTRUCTIONS
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  Follow-up here in about 4-6 months  Advised to schedule her mammogram and please submit the stool specimen  Advised son and patient no further Advil ibuprofen Aleve etc     She can have Tylenol for the pain  Medicare Preventive Visit Patient Instructions  Thank you for completing your Welcome to Medicare Visit or Medicare Annual Wellness Visit today  Your next wellness visit will be due in one year (8/17/2022)  The screening/preventive services that you may require over the next 5-10 years are detailed below  Some tests may not apply to you based off risk factors and/or age  Screening tests ordered at today's visit but not completed yet may show as past due  Also, please note that scanned in results may not display below  Preventive Screenings:  Service Recommendations Previous Testing/Comments   Colorectal Cancer Screening  * Colonoscopy    * Fecal Occult Blood Test (FOBT)/Fecal Immunochemical Test (FIT)  * Fecal DNA/Cologuard Test  * Flexible Sigmoidoscopy Age: 54-65 years old   Colonoscopy: every 10 years (may be performed more frequently if at higher risk)  OR  FOBT/FIT: every 1 year  OR  Cologuard: every 3 years  OR  Sigmoidoscopy: every 5 years  Screening may be recommended earlier than age 48 if at higher risk for colorectal cancer  Also, an individualized decision between you and your healthcare provider will decide whether screening between the ages of 74-80 would be appropriate   Colonoscopy: Not on file  FOBT/FIT: Not on file  Cologuard: Not on file  Sigmoidoscopy: Not on file          Breast Cancer Screening Age: 36 years old  Frequency: every 1-2 years  Not required if history of left and right mastectomy Mammogram: 02/25/2020    Screening Current   Cervical Cancer Screening Between the ages of 21-29, pap smear recommended once every 3 years  Between the ages of 33-67, can perform pap smear with HPV co-testing every 5 years  Recommendations may differ for women with a history of total hysterectomy, cervical cancer, or abnormal pap smears in past  Pap Smear: Not on file    Screening Not Indicated   Hepatitis C Screening Once for adults born between 1945 and 1965  More frequently in patients at high risk for Hepatitis C Hep C Antibody: Not on file        Diabetes Screening 1-2 times per year if you're at risk for diabetes or have pre-diabetes Fasting glucose: 94 mg/dL   A1C: No results in last 5 years    Screening Current   Cholesterol Screening Once every 5 years if you don't have a lipid disorder  May order more often based on risk factors  Lipid panel: 01/07/2020    Screening Current     Other Preventive Screenings Covered by Medicare:  1  Abdominal Aortic Aneurysm (AAA) Screening: covered once if your at risk  You're considered to be at risk if you have a family history of AAA  2  Lung Cancer Screening: covers low dose CT scan once per year if you meet all of the following conditions: (1) Age 50-69; (2) No signs or symptoms of lung cancer; (3) Current smoker or have quit smoking within the last 15 years; (4) You have a tobacco smoking history of at least 30 pack years (packs per day multiplied by number of years you smoked); (5) You get a written order from a healthcare provider  3  Glaucoma Screening: covered annually if you're considered high risk: (1) You have diabetes OR (2) Family history of glaucoma OR (3)  aged 48 and older OR (3)  American aged 72 and older  3   Osteoporosis Screening: covered every 2 years if you meet one of the following conditions: (1) You're estrogen deficient and at risk for osteoporosis based off medical history and other findings; (2) Have a vertebral abnormality; (3) On glucocorticoid therapy for more than 3 months; (4) Have primary hyperparathyroidism; (5) On osteoporosis medications and need to assess response to drug therapy  · Last bone density test (DXA Scan): 05/12/2021   5  HIV Screening: covered annually if you're between the age of 15-65  Also covered annually if you are younger than 13 and older than 72 with risk factors for HIV infection  For pregnant patients, it is covered up to 3 times per pregnancy  Immunizations:  Immunization Recommendations   Influenza Vaccine Annual influenza vaccination during flu season is recommended for all persons aged >= 6 months who do not have contraindications   Pneumococcal Vaccine (Prevnar and Pneumovax)  * Prevnar = PCV13  * Pneumovax = PPSV23   Adults 25-60 years old: 1-3 doses may be recommended based on certain risk factors  Adults 72 years old: Prevnar (PCV13) vaccine recommended followed by Pneumovax (PPSV23) vaccine  If already received PPSV23 since turning 65, then PCV13 recommended at least one year after PPSV23 dose  Hepatitis B Vaccine 3 dose series if at intermediate or high risk (ex: diabetes, end stage renal disease, liver disease)   Tetanus (Td) Vaccine - COST NOT COVERED BY MEDICARE PART B Following completion of primary series, a booster dose should be given every 10 years to maintain immunity against tetanus  Td may also be given as tetanus wound prophylaxis  Tdap Vaccine - COST NOT COVERED BY MEDICARE PART B Recommended at least once for all adults  For pregnant patients, recommended with each pregnancy  Shingles Vaccine (Shingrix) - COST NOT COVERED BY MEDICARE PART B  2 shot series recommended in those aged 48 and above     Health Maintenance Due:  There are no preventive care reminders to display for this patient    Immunizations Due:      Topic Date Due    Influenza Vaccine (1) 09/01/2021     Advance Directives   What are advance directives? Advance directives are legal documents that state your wishes and plans for medical care  These plans are made ahead of time in case you lose your ability to make decisions for yourself  Advance directives can apply to any medical decision, such as the treatments you want, and if you want to donate organs  What are the types of advance directives? There are many types of advance directives, and each state has rules about how to use them  You may choose a combination of any of the following:  · Living will: This is a written record of the treatment you want  You can also choose which treatments you do not want, which to limit, and which to stop at a certain time  This includes surgery, medicine, IV fluid, and tube feedings  · Durable power of  for healthcare [facility]): This is a written record that states who you want to make healthcare choices for you when you are unable to make them for yourself  This person, called a proxy, is usually a family member or a friend  You may choose more than 1 proxy  · Do not resuscitate (DNR) order:  A DNR order is used in case your heart stops beating or you stop breathing  It is a request not to have certain forms of treatment, such as CPR  A DNR order may be included in other types of advance directives  · Medical directive: This covers the care that you want if you are in a coma, near death, or unable to make decisions for yourself  You can list the treatments you want for each condition  Treatment may include pain medicine, surgery, blood transfusions, dialysis, IV or tube feedings, and a ventilator (breathing machine)  · Values history: This document has questions about your views, beliefs, and how you feel and think about life  This information can help others choose the care that you would choose  Why are advance directives important? An advance directive helps you control your care   Although spoken wishes may be used, it is better to have your wishes written down  Spoken wishes can be misunderstood, or not followed  Treatments may be given even if you do not want them  An advance directive may make it easier for your family to make difficult choices about your care  © Copyright [employer] 2018 Information is for End User's use only and may not be sold, redistributed or otherwise used for commercial purposes  All illustrations and images included in CareNotes® are the copyrighted property of [employer]  or [facility] Preventive Visit Patient Instructions  Thank you for completing your Welcome to Medicare Visit or Medicare Annual Wellness Visit today  Your next wellness visit will be due in one year (8/17/2022)  The screening/preventive services that you may require over the next 5-10 years are detailed below  Some tests may not apply to you based off risk factors and/or age  Screening tests ordered at today's visit but not completed yet may show as past due  Also, please note that scanned in results may not display below  Preventive Screenings:  Service Recommendations Previous Testing/Comments   Colorectal Cancer Screening  * Colonoscopy    * Fecal Occult Blood Test (FOBT)/Fecal Immunochemical Test (FIT)  * Fecal DNA/Cologuard Test  * Flexible Sigmoidoscopy Age: 54-65 years old   Colonoscopy: every 10 years (may be performed more frequently if at higher risk)  OR  FOBT/FIT: every 1 year  OR  Cologuard: every 3 years  OR  Sigmoidoscopy: every 5 years  Screening may be recommended earlier than age 48 if at higher risk for colorectal cancer  Also, an individualized decision between you and your healthcare provider will decide whether screening between the ages of 74-80 would be appropriate   Colonoscopy: Not on file  FOBT/FIT: Not on file  Cologuard: Not on file  Sigmoidoscopy: Not on file          Breast Cancer Screening Age: 36 years old  Frequency: every 1-2 years  Not required if history of left and right mastectomy Mammogram: 02/25/2020    Screening Current   Cervical Cancer Screening Between the ages of 21-29, pap smear recommended once every 3 years  Between the ages of 33-67, can perform pap smear with HPV co-testing every 5 years  Recommendations may differ for women with a history of total hysterectomy, cervical cancer, or abnormal pap smears in past  Pap Smear: Not on file    Screening Not Indicated   Hepatitis C Screening Once for adults born between 1945 and 1965  More frequently in patients at high risk for Hepatitis C Hep C Antibody: Not on file        Diabetes Screening 1-2 times per year if you're at risk for diabetes or have pre-diabetes Fasting glucose: 94 mg/dL   A1C: No results in last 5 years    Screening Current   Cholesterol Screening Once every 5 years if you don't have a lipid disorder  May order more often based on risk factors  Lipid panel: 01/07/2020    Screening Current     Other Preventive Screenings Covered by Medicare:  6  Abdominal Aortic Aneurysm (AAA) Screening: covered once if your at risk  You're considered to be at risk if you have a family history of AAA  7  Lung Cancer Screening: covers low dose CT scan once per year if you meet all of the following conditions: (1) Age 50-69; (2) No signs or symptoms of lung cancer; (3) Current smoker or have quit smoking within the last 15 years; (4) You have a tobacco smoking history of at least 30 pack years (packs per day multiplied by number of years you smoked); (5) You get a written order from a healthcare provider  8  Glaucoma Screening: covered annually if you're considered high risk: (1) You have diabetes OR (2) Family history of glaucoma OR (3)  aged 48 and older OR (3)  American aged 72 and older  5   Osteoporosis Screening: covered every 2 years if you meet one of the following conditions: (1) You're estrogen deficient and at risk for osteoporosis based off medical history and other findings; (2) Have a vertebral abnormality; (3) On glucocorticoid therapy for more than 3 months; (4) Have primary hyperparathyroidism; (5) On osteoporosis medications and need to assess response to drug therapy  · Last bone density test (DXA Scan): 05/12/2021  10  HIV Screening: covered annually if you're between the age of 12-76  Also covered annually if you are younger than 13 and older than 72 with risk factors for HIV infection  For pregnant patients, it is covered up to 3 times per pregnancy  Immunizations:  Immunization Recommendations   Influenza Vaccine Annual influenza vaccination during flu season is recommended for all persons aged >= 6 months who do not have contraindications   Pneumococcal Vaccine (Prevnar and Pneumovax)  * Prevnar = PCV13  * Pneumovax = PPSV23   Adults 25-60 years old: 1-3 doses may be recommended based on certain risk factors  Adults 72 years old: Prevnar (PCV13) vaccine recommended followed by Pneumovax (PPSV23) vaccine  If already received PPSV23 since turning 65, then PCV13 recommended at least one year after PPSV23 dose  Hepatitis B Vaccine 3 dose series if at intermediate or high risk (ex: diabetes, end stage renal disease, liver disease)   Tetanus (Td) Vaccine - COST NOT COVERED BY MEDICARE PART B Following completion of primary series, a booster dose should be given every 10 years to maintain immunity against tetanus  Td may also be given as tetanus wound prophylaxis  Tdap Vaccine - COST NOT COVERED BY MEDICARE PART B Recommended at least once for all adults  For pregnant patients, recommended with each pregnancy  Shingles Vaccine (Shingrix) - COST NOT COVERED BY MEDICARE PART B  2 shot series recommended in those aged 48 and above     Health Maintenance Due:  There are no preventive care reminders to display for this patient  Immunizations Due:      Topic Date Due    Influenza Vaccine (1) 09/01/2021     Advance Directives   What are advance directives?   Advance directives are legal documents that state your wishes and plans for medical care  These plans are made ahead of time in case you lose your ability to make decisions for yourself  Advance directives can apply to any medical decision, such as the treatments you want, and if you want to donate organs  What are the types of advance directives? There are many types of advance directives, and each state has rules about how to use them  You may choose a combination of any of the following:  · Living will: This is a written record of the treatment you want  You can also choose which treatments you do not want, which to limit, and which to stop at a certain time  This includes surgery, medicine, IV fluid, and tube feedings  · Durable power of  for healthcare [facility]): This is a written record that states who you want to make healthcare choices for you when you are unable to make them for yourself  This person, called a proxy, is usually a family member or a friend  You may choose more than 1 proxy  · Do not resuscitate (DNR) order:  A DNR order is used in case your heart stops beating or you stop breathing  It is a request not to have certain forms of treatment, such as CPR  A DNR order may be included in other types of advance directives  · Medical directive: This covers the care that you want if you are in a coma, near death, or unable to make decisions for yourself  You can list the treatments you want for each condition  Treatment may include pain medicine, surgery, blood transfusions, dialysis, IV or tube feedings, and a ventilator (breathing machine)  · Values history: This document has questions about your views, beliefs, and how you feel and think about life  This information can help others choose the care that you would choose  Why are advance directives important? An advance directive helps you control your care  Although spoken wishes may be used, it is better to have your wishes written down   Spoken wishes can be misunderstood, or not followed  Treatments may be given even if you do not want them  An advance directive may make it easier for your family to make difficult choices about your care  © Copyright [employer] 2018 Information is for End User's use only and may not be sold, redistributed or otherwise used for commercial purposes   All illustrations and images included in CareNotes® are the copyrighted property of [employer]  or [address] [place] [address]

## 2021-08-16 NOTE — PROGRESS NOTES
Assessment/Plan:     Chronic Problems:  Pituitary mass [facility])  Keep the f/u appt with Dr Karolyn Aase  Stage 3b chronic kidney disease  Lab Results   Component Value Date    EGFR 35 04/20/2021    EGFR 34 03/30/2021    EGFR 36 03/04/2021    CREATININE 1 40 (H) 04/20/2021    CREATININE 1 42 (H) 03/30/2021    CREATININE 1 37 (H) 03/04/2021   Will recheck labs done and call with results  No aleve, advil, ibuprofen  Tylenol is ok  Benign hypertension  BP is at goal  Continue current meds  Ascending aortic aneurysm ([address] )  Will recheck in 1 year  Aortic valve sclerosis  Will continue to monitor  Will repeat echo next year  Visit Diagnosis:  Diagnoses and all orders for this visit:    Medicare annual wellness visit, subsequent    Encounter for screening mammogram for breast cancer  -     Mammo screening bilateral w 3d & cad; Future    Screening for colorectal cancer  -     Occult Blood, Fecal Immunochemical (FIT); Future    Pituitary mass (HCC)    Stage 3b chronic kidney disease  -     Comprehensive metabolic panel; Future  -     Microalbumin / creatinine urine ratio  -     Urinalysis with microscopic    Benign hypertension    Ascending aortic aneurysm (HCC)  -     CT chest wo contrast; Future    Aortic valve sclerosis  -     Echo complete with contrast if indicated; Future    CKD (chronic kidney disease) stage 4, GFR 15-29 ml/min (HCC)          Subjective:    Patient ID: Julien May is a 78 y o  female  Pt is here ac by her son  Occasionally has neck and back pains  Takes otc aleve for this  Follows with Dr Karolyn Aase for her memory issues  Pt's family decided they did not want her to have any more cholesterol meds  Pt has no other complaints today  Takes all other meds as directed  No side effects noted  Pt is not sure if she needs renewals on meds  Gained 6 lbs since the last visit         The following portions of the patient's history were reviewed and updated as appropriate: allergies, current medications, past family history, past medical history, past social history, past surgical history and problem list     Review of Systems   Constitutional: Negative for chills, diaphoresis, fatigue and fever  HENT: Negative  Eyes: Negative  Respiratory: Negative for cough, shortness of breath and wheezing  Cardiovascular: Negative for chest pain and palpitations  Gastrointestinal: Negative for abdominal pain, constipation, diarrhea, nausea and vomiting  Genitourinary: Negative for dysuria, flank pain and urgency  Musculoskeletal: Positive for back pain and neck pain  Neurological: Negative for dizziness, light-headedness and headaches  Psychiatric/Behavioral: Negative for dysphoric mood  The patient is not nervous/anxious  /66   Pulse 78   Temp (!) 97 3 °F (36 3 °C)   Resp 16   Ht 5' 2" (1 575 m)   Wt 54 4 kg (120 lb)   LMP  (LMP Unknown)   SpO2 98%   BMI 21 95 kg/m²   Social History     Socioeconomic History    Marital status:      Spouse name: Not on file    Number of children: Not on file    Years of education: Not on file    Highest education level: Not on file   Occupational History    Not on file   Tobacco Use    Smoking status: Never Smoker    Smokeless tobacco: Never Used   Vaping Use    Vaping Use: Never used   Substance and Sexual Activity    Alcohol use: Yes     Alcohol/week: 7 0 standard drinks     Types: 7 Glasses of wine per week     Comment: socially     Drug use: Never    Sexual activity: Never   Other Topics Concern    Not on file   Social History Narrative    Not on file     Social Determinants of Health     Financial Resource Strain:     Difficulty of Paying Living Expenses:    Food Insecurity:     Worried About Running Out of Food in the Last Year:     [address] in the Last Year:    Transportation Needs:     Lack of Transportation (Medical):      Lack of Transportation (Non-Medical):    Physical Activity:     Days of Exercise per Week:     Minutes of Exercise per Session:    Stress:     Feeling of Stress :    Social Connections:     Frequency of Communication with Friends and Family:     Frequency of Social Gatherings with Friends and Family:     Attends Nondenominational Services:     Active Member of Clubs or Organizations:     Attends Club or Organization Meetings:     Marital Status:    Intimate Partner Violence:     Fear of Current or Ex-Partner:     Emotionally Abused:     Physically Abused:     Sexually Abused:      Past Medical History:   Diagnosis Date    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     A[address] [address] )     4 1 cm on December 6, 2018    Chronic kidney disease     CKD (chronic kidney disease) stage 2, GFR 60-89 ml/min     Colon polyps     Dementia ([facility] [address] )     Hyperlipidemia     Hypertension     Insomnia     Memory loss     Osteopenia with elevated frax score     Osteoporosis     Tubular adenoma of colon 04/2019    Uterine fibroid      Family History   Problem Relation Age of Onset    Cancer Mother     Cancer Father      Past Surgical History:   Procedure Laterality Date    COLONOSCOPY  4185    date not certain     FOOT SURGERY      DE COLONOSCOPY FLX DX W/COLLJ SPEC WHEN PFRMD N/A 4/24/2019    Procedure: COLONOSCOPY;  Surgeon: Tammie Garcia MD;  Location: [facility]; Service: Gastroenterology    DE ESOPHAGOGASTRODUODENOSCOPY TRANSORAL DIAGNOSTIC N/A 4/24/2019    Procedure: ESOPHAGOGASTRODUODENOSCOPY (EGD); Surgeon: Tammie Garcia MD;  Location: [facility];   Service: Gastroenterology       Current Outpatient Medications:     alendronate (Fosamax) 70 mg tablet, Take 1 tablet (70 mg total) by mouth every 7 days, Disp: 4 tablet, Rfl: 5    aspirin 81 MG tablet, Take 1 tablet (81 mg total) by mouth daily, Disp: , Rfl:     cholecalciferol (VITAMIN D3) 1,000 units tablet, Take 2,000 Units by mouth daily, Disp: , Rfl:     lisinopril (ZESTRIL) 5 mg tablet, TAKE ONE TABLET BY MOUTH EVERY DAY, Disp: 90 tablet, Rfl: 0    memantine (NAMENDA) 10 mg tablet, Take 1 tablet (10 mg total) by mouth 2 (two) times a day, Disp: 180 tablet, Rfl: 1    mirtazapine (REMERON) 15 mg tablet, TAKE ONE TABLET BY MOUTH EVERY DAY AT BEDTIME, Disp: 30 tablet, Rfl: 5    pantoprazole (PROTONIX) 40 mg tablet, TAKE ONE TABLET BY MOUTH EVERY DAY, Disp: 90 tablet, Rfl: 1    Allergies   Allergen Reactions    Cefazolin      Other reaction(s): Unknown          Lab Review   No visits with results within 2 Month(s) from this visit  Latest known visit with results is:   Appointment on 04/20/2021   Component Date Value    Sodium 04/20/2021 138     Potassium 04/20/2021 4 2     Chloride 04/20/2021 106     CO2 04/20/2021 26     ANION GAP 04/20/2021 6     BUN 04/20/2021 30*    Creatinine 04/20/2021 1 40*    Glucose, Fasting 04/20/2021 94     Calcium 04/20/2021 9 8     AST 04/20/2021 17     ALT 04/20/2021 20     Alkaline Phosphatase 04/20/2021 91     Total Protein 04/20/2021 7 5     Albumin 04/20/2021 3 9     Total Bilirubin 04/20/2021 0 53     eGFR 04/20/2021 35     Vit D, 25-Hydroxy 04/20/2021 24 1*        Imaging: No results found  Objective:     Physical Exam  Vitals and nursing note reviewed  Constitutional:       General: She is not in acute distress  Appearance: Normal appearance  She is well-developed  She is not ill-appearing, toxic-appearing or diaphoretic  HENT:      Head: Normocephalic and atraumatic  Right Ear: Tympanic membrane, ear canal and external ear normal  There is no impacted cerumen  Left Ear: Tympanic membrane, ear canal and external ear normal  There is no impacted cerumen  Mouth/Throat:      Mouth: Mucous membranes are moist    Eyes:      General:         Right eye: No discharge  Left eye: No discharge  Conjunctiva/sclera: Conjunctivae normal       Pupils: Pupils are equal, round, and reactive to light     Neck:      Thyroid: No thyromegaly  Cardiovascular:      Rate and Rhythm: Normal rate and regular rhythm  Heart sounds: Normal heart sounds  No murmur heard  No friction rub  Comments: Grade 2/6 systolic murmur noted  Pulmonary:      Effort: Pulmonary effort is normal  No respiratory distress  Breath sounds: Normal breath sounds  No wheezing or rales  Abdominal:      General: Bowel sounds are normal       Palpations: Abdomen is soft  Tenderness: There is no abdominal tenderness  Musculoskeletal:         General: No tenderness or deformity  Normal range of motion  Cervical back: Normal range of motion and neck supple  Lymphadenopathy:      Cervical: No cervical adenopathy  Skin:     General: Skin is warm and dry  Findings: No rash  Neurological:      Mental Status: She is alert and oriented to person, place, and time  Cranial Nerves: No cranial nerve deficit  Psychiatric:         Mood and Affect: Mood normal          Behavior: Behavior normal          Thought Content: Thought content normal          Judgment: Judgment normal            Patient Instructions       Medicare Preventive Visit Patient Instructions  Thank you for completing your Welcome to Medicare Visit or Medicare Annual Wellness Visit today  Your next wellness visit will be due in one year (8/17/2022)  The screening/preventive services that you may require over the next 5-10 years are detailed below  Some tests may not apply to you based off risk factors and/or age  Screening tests ordered at today's visit but not completed yet may show as past due  Also, please note that scanned in results may not display below    Preventive Screenings:  Service Recommendations Previous Testing/Comments   Colorectal Cancer Screening  * Colonoscopy    * Fecal Occult Blood Test (FOBT)/Fecal Immunochemical Test (FIT)  * Fecal DNA/Cologuard Test  * Flexible Sigmoidoscopy Age: 54-65 years old   Colonoscopy: every 10 years (may be performed more frequently if at higher risk)  OR  FOBT/FIT: every 1 year  OR  Cologuard: every 3 years  OR  Sigmoidoscopy: every 5 years  Screening may be recommended earlier than age 48 if at higher risk for colorectal cancer  Also, an individualized decision between you and your healthcare provider will decide whether screening between the ages of 74-80 would be appropriate  Colonoscopy: Not on file  FOBT/FIT: Not on file  Cologuard: Not on file  Sigmoidoscopy: Not on file          Breast Cancer Screening Age: 36 years old  Frequency: every 1-2 years  Not required if history of left and right mastectomy Mammogram: 02/25/2020    Screening Current   Cervical Cancer Screening Between the ages of 21-29, pap smear recommended once every 3 years  Between the ages of 33-67, can perform pap smear with HPV co-testing every 5 years  Recommendations may differ for women with a history of total hysterectomy, cervical cancer, or abnormal pap smears in past  Pap Smear: Not on file    Screening Not Indicated   Hepatitis C Screening Once for adults born between 1945 and 1965  More frequently in patients at high risk for Hepatitis C Hep C Antibody: Not on file        Diabetes Screening 1-2 times per year if you're at risk for diabetes or have pre-diabetes Fasting glucose: 94 mg/dL   A1C: No results in last 5 years    Screening Current   Cholesterol Screening Once every 5 years if you don't have a lipid disorder  May order more often based on risk factors  Lipid panel: 01/07/2020    Screening Current     Other Preventive Screenings Covered by Medicare:  1  Abdominal Aortic Aneurysm (AAA) Screening: covered once if your at risk  You're considered to be at risk if you have a family history of AAA    2  Lung Cancer Screening: covers low dose CT scan once per year if you meet all of the following conditions: (1) Age 50-69; (2) No signs or symptoms of lung cancer; (3) Current smoker or have quit smoking within the last 15 years; (4) You have a tobacco smoking history of at least 30 pack years (packs per day multiplied by number of years you smoked); (5) You get a written order from a healthcare provider  3  Glaucoma Screening: covered annually if you're considered high risk: (1) You have diabetes OR (2) Family history of glaucoma OR (3)  aged 48 and older OR (3)  American aged 72 and older  3  Osteoporosis Screening: covered every 2 years if you meet one of the following conditions: (1) You're estrogen deficient and at risk for osteoporosis based off medical history and other findings; (2) Have a vertebral abnormality; (3) On glucocorticoid therapy for more than 3 months; (4) Have primary hyperparathyroidism; (5) On osteoporosis medications and need to assess response to drug therapy  · Last bone density test (DXA Scan): 05/12/2021   5  HIV Screening: covered annually if you're between the age of 15-65  Also covered annually if you are younger than 13 and older than 72 with risk factors for HIV infection  For pregnant patients, it is covered up to 3 times per pregnancy  Immunizations:  Immunization Recommendations   Influenza Vaccine Annual influenza vaccination during flu season is recommended for all persons aged >= 6 months who do not have contraindications   Pneumococcal Vaccine (Prevnar and Pneumovax)  * Prevnar = PCV13  * Pneumovax = PPSV23   Adults 25-60 years old: 1-3 doses may be recommended based on certain risk factors  Adults 72 years old: Prevnar (PCV13) vaccine recommended followed by Pneumovax (PPSV23) vaccine  If already received PPSV23 since turning 65, then PCV13 recommended at least one year after PPSV23 dose     Hepatitis B Vaccine 3 dose series if at intermediate or high risk (ex: diabetes, end stage renal disease, liver disease)   Tetanus (Td) Vaccine - COST NOT COVERED BY MEDICARE PART B Following completion of primary series, a booster dose should be given every 10 years to maintain immunity against tetanus  Td may also be given as tetanus wound prophylaxis  Tdap Vaccine - COST NOT COVERED BY MEDICARE PART B Recommended at least once for all adults  For pregnant patients, recommended with each pregnancy  Shingles Vaccine (Shingrix) - COST NOT COVERED BY MEDICARE PART B  2 shot series recommended in those aged 48 and above     Health Maintenance Due:  There are no preventive care reminders to display for this patient  Immunizations Due:      Topic Date Due    Influenza Vaccine (1) 09/01/2021     Advance Directives   What are advance directives? Advance directives are legal documents that state your wishes and plans for medical care  These plans are made ahead of time in case you lose your ability to make decisions for yourself  Advance directives can apply to any medical decision, such as the treatments you want, and if you want to donate organs  What are the types of advance directives? There are many types of advance directives, and each state has rules about how to use them  You may choose a combination of any of the following:  · Living will: This is a written record of the treatment you want  You can also choose which treatments you do not want, which to limit, and which to stop at a certain time  This includes surgery, medicine, IV fluid, and tube feedings  · Durable power of  for healthcare [place] [facility]): This is a written record that states who you want to make healthcare choices for you when you are unable to make them for yourself  This person, called a proxy, is usually a family member or a friend  You may choose more than 1 proxy  · Do not resuscitate (DNR) order:  A DNR order is used in case your heart stops beating or you stop breathing  It is a request not to have certain forms of treatment, such as CPR  A DNR order may be included in other types of advance directives  · Medical directive:   This covers the care that you want if you are in a coma, near death, or unable to make decisions for yourself  You can list the treatments you want for each condition  Treatment may include pain medicine, surgery, blood transfusions, dialysis, IV or tube feedings, and a ventilator (breathing machine)  · Values history: This document has questions about your views, beliefs, and how you feel and think about life  This information can help others choose the care that you would choose  Why are advance directives important? An advance directive helps you control your care  Although spoken wishes may be used, it is better to have your wishes written down  Spoken wishes can be misunderstood, or not followed  Treatments may be given even if you do not want them  An advance directive may make it easier for your family to make difficult choices about your care  © Copyright [employer] 2018 Information is for End User's use only and may not be sold, redistributed or otherwise used for commercial purposes  All illustrations and images included in CareNotes® are the copyrighted property of [employer]  or [facility] Preventive Visit Patient Instructions  Thank you for completing your Welcome to Medicare Visit or Medicare Annual Wellness Visit today  Your next wellness visit will be due in one year (8/17/2022)  The screening/preventive services that you may require over the next 5-10 years are detailed below  Some tests may not apply to you based off risk factors and/or age  Screening tests ordered at today's visit but not completed yet may show as past due  Also, please note that scanned in results may not display below    Preventive Screenings:  Service Recommendations Previous Testing/Comments   Colorectal Cancer Screening  * Colonoscopy    * Fecal Occult Blood Test (FOBT)/Fecal Immunochemical Test (FIT)  * Fecal DNA/Cologuard Test  * Flexible Sigmoidoscopy Age: 54-65 years old   Colonoscopy: every 10 years (may be performed more frequently if at higher risk)  OR  FOBT/FIT: every 1 year  OR  Cologuard: every 3 years  OR  Sigmoidoscopy: every 5 years  Screening may be recommended earlier than age 48 if at higher risk for colorectal cancer  Also, an individualized decision between you and your healthcare provider will decide whether screening between the ages of 74-80 would be appropriate  Colonoscopy: Not on file  FOBT/FIT: Not on file  Cologuard: Not on file  Sigmoidoscopy: Not on file          Breast Cancer Screening Age: 36 years old  Frequency: every 1-2 years  Not required if history of left and right mastectomy Mammogram: 02/25/2020    Screening Current   Cervical Cancer Screening Between the ages of 21-29, pap smear recommended once every 3 years  Between the ages of 33-67, can perform pap smear with HPV co-testing every 5 years  Recommendations may differ for women with a history of total hysterectomy, cervical cancer, or abnormal pap smears in past  Pap Smear: Not on file    Screening Not Indicated   Hepatitis C Screening Once for adults born between 1945 and 1965  More frequently in patients at high risk for Hepatitis C Hep C Antibody: Not on file        Diabetes Screening 1-2 times per year if you're at risk for diabetes or have pre-diabetes Fasting glucose: 94 mg/dL   A1C: No results in last 5 years    Screening Current   Cholesterol Screening Once every 5 years if you don't have a lipid disorder  May order more often based on risk factors  Lipid panel: 01/07/2020    Screening Current     Other Preventive Screenings Covered by Medicare:  6  Abdominal Aortic Aneurysm (AAA) Screening: covered once if your at risk  You're considered to be at risk if you have a family history of AAA    7  Lung Cancer Screening: covers low dose CT scan once per year if you meet all of the following conditions: (1) Age 50-69; (2) No signs or symptoms of lung cancer; (3) Current smoker or have quit smoking within the last 15 years; (4) You have a tobacco smoking history of at least 30 pack years (packs per day multiplied by number of years you smoked); (5) You get a written order from a healthcare provider  8  Glaucoma Screening: covered annually if you're considered high risk: (1) You have diabetes OR (2) Family history of glaucoma OR (3)  aged 48 and older OR (3)  American aged 72 and older  5  Osteoporosis Screening: covered every 2 years if you meet one of the following conditions: (1) You're estrogen deficient and at risk for osteoporosis based off medical history and other findings; (2) Have a vertebral abnormality; (3) On glucocorticoid therapy for more than 3 months; (4) Have primary hyperparathyroidism; (5) On osteoporosis medications and need to assess response to drug therapy  · Last bone density test (DXA Scan): 05/12/2021  10  HIV Screening: covered annually if you're between the age of 12-76  Also covered annually if you are younger than 13 and older than 72 with risk factors for HIV infection  For pregnant patients, it is covered up to 3 times per pregnancy  Immunizations:  Immunization Recommendations   Influenza Vaccine Annual influenza vaccination during flu season is recommended for all persons aged >= 6 months who do not have contraindications   Pneumococcal Vaccine (Prevnar and Pneumovax)  * Prevnar = PCV13  * Pneumovax = PPSV23   Adults 25-60 years old: 1-3 doses may be recommended based on certain risk factors  Adults 72 years old: Prevnar (PCV13) vaccine recommended followed by Pneumovax (PPSV23) vaccine  If already received PPSV23 since turning 65, then PCV13 recommended at least one year after PPSV23 dose  Hepatitis B Vaccine 3 dose series if at intermediate or high risk (ex: diabetes, end stage renal disease, liver disease)   Tetanus (Td) Vaccine - COST NOT COVERED BY MEDICARE PART B Following completion of primary series, a booster dose should be given every 10 years to maintain immunity against tetanus   Td may also be given as tetanus wound prophylaxis  Tdap Vaccine - COST NOT COVERED BY MEDICARE PART B Recommended at least once for all adults  For pregnant patients, recommended with each pregnancy  Shingles Vaccine (Shingrix) - COST NOT COVERED BY MEDICARE PART B  2 shot series recommended in those aged 48 and above     Health Maintenance Due:  There are no preventive care reminders to display for this patient  Immunizations Due:      Topic Date Due    Influenza Vaccine (1) 09/01/2021     Advance Directives   What are advance directives? Advance directives are legal documents that state your wishes and plans for medical care  These plans are made ahead of time in case you lose your ability to make decisions for yourself  Advance directives can apply to any medical decision, such as the treatments you want, and if you want to donate organs  What are the types of advance directives? There are many types of advance directives, and each state has rules about how to use them  You may choose a combination of any of the following:  · Living will: This is a written record of the treatment you want  You can also choose which treatments you do not want, which to limit, and which to stop at a certain time  This includes surgery, medicine, IV fluid, and tube feedings  · Durable power of  for healthcare [place] [facility]): This is a written record that states who you want to make healthcare choices for you when you are unable to make them for yourself  This person, called a proxy, is usually a family member or a friend  You may choose more than 1 proxy  · Do not resuscitate (DNR) order:  A DNR order is used in case your heart stops beating or you stop breathing  It is a request not to have certain forms of treatment, such as CPR  A DNR order may be included in other types of advance directives  · Medical directive: This covers the care that you want if you are in a coma, near death, or unable to make decisions for yourself   You can list the treatments you want for each condition  Treatment may include pain medicine, surgery, blood transfusions, dialysis, IV or tube feedings, and a ventilator (breathing machine)  · Values history: This document has questions about your views, beliefs, and how you feel and think about life  This information can help others choose the care that you would choose  Why are advance directives important? An advance directive helps you control your care  Although spoken wishes may be used, it is better to have your wishes written down  Spoken wishes can be misunderstood, or not followed  Treatments may be given even if you do not want them  An advance directive may make it easier for your family to make difficult choices about your care  © Copyright [employer] 2018 Information is for End User's use only and may not be sold, redistributed or otherwise used for commercial purposes  All illustrations and images included in CareNotes® are the copyrighted property of [employer]  or [address] [place] [address]    Portions of the record may have been created with voice recognition software  Occasional wrong word or "sound a like" substitutions may have occurred due to the inherent limitations of voice recognition software  Read the chart carefully and recognize, using context, where substitutions have occurred

## 2021-08-16 NOTE — PROGRESS NOTES
Assessment and Plan:     Problem List Items Addressed This Visit     None      Visit Diagnoses     Medicare annual wellness visit, subsequent    -  Primary    Encounter for screening mammogram for breast cancer        Relevant Orders    Mammo screening bilateral w 3d & cad    Screening for colorectal cancer        Relevant Orders    Occult Blood, Fecal Immunochemical (FIT)           Preventive health issues were discussed with patient, and age appropriate screening tests were ordered as noted in patient's After Visit Summary  Personalized health advice and appropriate referrals for health education or preventive services given if needed, as noted in patient's After Visit Summary       History of Present Illness:     Patient presents for Medicare Annual Wellness visit    Patient Care Team:  Eleanor Collazo as PCP - General (Family Medicine)  Kala Arce DO as PCP - [address] [place] [address] (RTE)  Kala Arce DO as PCP - [facility] ([facility])  Ambrocio Martins MD as Endoscopist     Problem List:     Patient Active Problem List   Diagnosis    Benign hypertension    Dementia without behavioral disturbance ([address] )    Dizziness    Nodule of left lung    Ascending aortic aneurysm ([address] )    Stage 3b chronic kidney disease    Tubular adenoma    Pituitary mass ([address] )    Dysthymia    Wheeze    Heart murmur    Aortic valve sclerosis    Chronic kidney disease-mineral and bone disorder    Osteoporosis      Past Medical and Surgical History:     Past Medical History:   Diagnosis Date    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     Aortic aneurysm (HCC)     4 1 cm on December 6, 2018    Chronic kidney disease     CKD (chronic kidney disease) stage 2, GFR 60-89 ml/min     Colon polyps     Dementia ([address] )     Hyperlipidemia     Hypertension     Insomnia     Memory loss     Osteopenia with elevated frax score     Osteoporosis     Tubular adenoma of colon 04/2019    Uterine fibroid Past Surgical History:   Procedure Laterality Date    COLONOSCOPY  6898    date not certain     FOOT SURGERY      OR COLONOSCOPY FLX DX W/COLLJ SPEC WHEN PFRMD N/A 4/24/2019    Procedure: COLONOSCOPY;  Surgeon: Yohana John MD;  Location: [facility]; Service: Gastroenterology    OR ESOPHAGOGASTRODUODENOSCOPY TRANSORAL DIAGNOSTIC N/A 4/24/2019    Procedure: ESOPHAGOGASTRODUODENOSCOPY (EGD); Surgeon: Yohana John MD;  Location[facility]; Service: Gastroenterology      Family History:     Family History   Problem Relation Age of Onset    Cancer Mother     Cancer Father       Social History:     Social History     Socioeconomic History    Marital status:      Spouse name: None    Number of children: None    Years of education: None    Highest education level: None   Occupational History    None   Tobacco Use    Smoking status: Never Smoker    Smokeless tobacco: Never Used   Vaping Use    Vaping Use: Never used   Substance and Sexual Activity    Alcohol use: Yes     Alcohol/week: 7 0 standard drinks     Types: 7 Glasses of wine per week     Comment: socially     Drug use: Never    Sexual activity: Never   Other Topics Concern    None   Social History Narrative    None     Social Determinants of Health     Financial Resource Strain:     Difficulty of Paying Living Expenses:    Food Insecurity:     Worried About Running Out of Food in the Last Year:     Ran Out of Food in the Last Year:    Transportation Needs:     Lack of Transportation (Medical):      Lack of Transportation (Non-Medical):    Physical Activity:     Days of Exercise per Week:     Minutes of Exercise per Session:    Stress:     Feeling of Stress :    Social Connections:     Frequency of Communication with Friends and Family:     Frequency of Social Gatherings with Friends and Family:     Attends Gnosticist Services:     Active Member of Clubs or Organizations:     Attends Club or Organization Meetings:  Marital Status:    Intimate Partner Violence:     Fear of Current or Ex-Partner:     Emotionally Abused:     Physically Abused:     Sexually Abused:       Medications and Allergies:     Current Outpatient Medications   Medication Sig Dispense Refill    alendronate (Fosamax) 70 mg tablet Take 1 tablet (70 mg total) by mouth every 7 days 4 tablet 5    aspirin 81 MG tablet Take 1 tablet (81 mg total) by mouth daily      cholecalciferol (VITAMIN D3) 1,000 units tablet Take 2,000 Units by mouth daily      lisinopril (ZESTRIL) 5 mg tablet TAKE ONE TABLET BY MOUTH EVERY DAY 90 tablet 0    memantine (NAMENDA) 10 mg tablet Take 1 tablet (10 mg total) by mouth 2 (two) times a day 180 tablet 1    mirtazapine (REMERON) 15 mg tablet TAKE ONE TABLET BY MOUTH EVERY DAY AT BEDTIME 30 tablet 5    pantoprazole (PROTONIX) 40 mg tablet TAKE ONE TABLET BY MOUTH EVERY DAY 90 tablet 1     No current facility-administered medications for this visit  Allergies   Allergen Reactions    Cefazolin      Other reaction(s): Unknown      Immunizations:     Immunization History   Administered Date(s) Administered    Influenza, high dose seasonal 0 7 mL 09/13/2018, 10/23/2020    Pneumococcal Conjugate 13-Valent 09/12/2015    Pneumococcal Polysaccharide PPV23 11/08/2007    SARS-CoV-2 / COVID-19 mRNA IM (Pfizer-BioNTech) 05/13/2021, 06/04/2021    Tdap 11/16/2009    Tuberculin Skin Test-PPD Intradermal 12/18/2019    Zoster 07/30/2008    Zoster Vaccine Recombinant 08/13/2020, 12/10/2020    influenza, trivalent, adjuvanted 09/27/2019      Health Maintenance: There are no preventive care reminders to display for this patient        Topic Date Due    Influenza Vaccine (1) 09/01/2021      Medicare Health Risk Assessment:     /66   Pulse 78   Temp (!) 97 3 °F (36 3 °C)   Resp 16   Ht 5' 2" (1 575 m)   Wt 54 4 kg (120 lb)   LMP  (LMP Unknown)   SpO2 98%   BMI 21 95 kg/m²      Blanca Moreno is here for her Subsequent Wellness visit  Health Risk Assessment:   Patient rates overall health as very good  Patient feels that their physical health rating is much better  Patient is very satisfied with their life  Eyesight was rated as same  Hearing was rated as same  Patient feels that their emotional and mental health rating is same  Patients states they are never, rarely angry  Patient states they are sometimes unusually tired/fatigued  Pain experienced in the last 7 days has been some  Patient's pain rating has been 5/10  Patient states that she has experienced no weight loss or gain in last 6 months  Depression Screening:   PHQ-2 Score: 0  PHQ-9 Score: 0      Fall Risk Screening: In the past year, patient has experienced: no history of falling in past year      Urinary Incontinence Screening:   Patient has not leaked urine accidently in the last six months  Home Safety:  Patient does not have trouble with stairs inside or outside of their home  Patient has working smoke alarms and has working carbon monoxide detector  Home safety hazards include: none  Nutrition:   Current diet is Regular  Medications:   Patient is not currently taking any over-the-counter supplements  Patient is able to manage medications  Activities of Daily Living (ADLs)/Instrumental Activities of Daily Living (IADLs):   Walk and transfer into and out of bed and chair?: Yes  Dress and groom yourself?: Yes    Bathe or shower yourself?: Yes    Feed yourself? Yes  Do your laundry/housekeeping?: Yes  Manage your money, pay your bills and track your expenses?: Yes  Make your own meals?: Yes    Do your own shopping?: Yes    Previous Hospitalizations:   Any hospitalizations or ED visits within the last 12 months?: Yes    How many hospitalizations have you had in the last year?: 1-2    Advance Care Planning:   Living will: Yes    Durable POA for healthcare:  Yes    Advanced directive: Yes    Advanced directive counseling given: Yes    Five wishes given: Yes    Patient declined ACP directive: No    End of Life Decisions reviewed with patient: Yes    Provider agrees with end of life decisions: Yes      Comments: Pt would not want resuscitation if no hope for recovery  Cognitive Screening:   Provider or family/friend/caregiver concerned regarding cognition?: Yes    Cognition Comments: Family states her memory is very stable and follows with Dr Melissa Gongora  PREVENTIVE SCREENINGS      Cardiovascular Screening:    General: Screening Current      Diabetes Screening:     General: Screening Current      Colorectal Cancer Screening:     General: Risks and Benefits Discussed    Due for: FOBT/FIT      Breast Cancer Screening:     General: Risks and Benefits Discussed    Due for: Mammogram        Cervical Cancer Screening:    General: Screening Not Indicated      Osteoporosis Screening:    General: Screening Not Indicated and History Osteoporosis      Lung Cancer Screening:     General: Screening Not Indicated      Hepatitis C Screening:    General: Risks and Benefits Discussed    Hep C Screening Accepted: No     Screening, Brief Intervention, and Referral to Treatment (SBIRT)    Screening  Typical number of drinks in a day: 0  Typical number of drinks in a week: 2  Interpretation: Low risk drinking behavior      Single Item Drug Screening:  How often have you used an illegal drug (including marijuana) or a prescription medication for non-medical reasons in the past year? never    Single Item Drug Screen Score: 0  Interpretation: Negative screen for possible drug use disorder      DASIA Davies

## 2021-08-16 NOTE — ASSESSMENT & PLAN NOTE
Lab Results   Component Value Date    EGFR 35 04/20/2021    EGFR 34 03/30/2021    EGFR 36 03/04/2021    CREATININE 1 40 (H) 04/20/2021    CREATININE 1 42 (H) 03/30/2021    CREATININE 1 37 (H) 03/04/2021   Will recheck labs done and call with results  No aleve, advil, ibuprofen  Tylenol is ok

## 2021-08-26 DIAGNOSIS — R63.4 WEIGHT LOSS: ICD-10-CM

## 2021-08-26 DIAGNOSIS — F34.1 DYSTHYMIA: ICD-10-CM

## 2021-08-27 RX ORDER — MIRTAZAPINE 15 MG/1
TABLET, FILM COATED ORAL
Qty: 30 TABLET | Refills: 5 | Status: SHIPPED | OUTPATIENT
Start: 2021-08-27 | End: 2022-04-18 | Stop reason: SDUPTHER

## 2021-10-18 ENCOUNTER — TELEPHONE (OUTPATIENT)
Dept: NEUROLOGY | Facility: CLINIC | Age: 81
End: 2021-10-18

## 2021-10-21 ENCOUNTER — OFFICE VISIT (OUTPATIENT)
Dept: FAMILY MEDICINE CLINIC | Facility: CLINIC | Age: 81
End: 2021-10-21
Payer: COMMERCIAL

## 2021-10-21 VITALS
HEART RATE: 70 BPM | DIASTOLIC BLOOD PRESSURE: 64 MMHG | WEIGHT: 123.6 LBS | BODY MASS INDEX: 22.74 KG/M2 | OXYGEN SATURATION: 97 % | TEMPERATURE: 97.8 F | HEIGHT: 62 IN | SYSTOLIC BLOOD PRESSURE: 109 MMHG

## 2021-10-21 DIAGNOSIS — Z23 IMMUNIZATION DUE: Primary | ICD-10-CM

## 2021-10-21 DIAGNOSIS — R01.1 HEART MURMUR: ICD-10-CM

## 2021-10-21 DIAGNOSIS — F02.80 LATE ONSET ALZHEIMER'S DEMENTIA WITHOUT BEHAVIORAL DISTURBANCE (HCC): ICD-10-CM

## 2021-10-21 DIAGNOSIS — I71.2 ASCENDING AORTIC ANEURYSM (HCC): ICD-10-CM

## 2021-10-21 DIAGNOSIS — R09.89 LABILE HYPERTENSION: ICD-10-CM

## 2021-10-21 DIAGNOSIS — I35.8 AORTIC VALVE SCLEROSIS: ICD-10-CM

## 2021-10-21 DIAGNOSIS — N18.32 STAGE 3B CHRONIC KIDNEY DISEASE (HCC): ICD-10-CM

## 2021-10-21 DIAGNOSIS — G30.1 LATE ONSET ALZHEIMER'S DEMENTIA WITHOUT BEHAVIORAL DISTURBANCE (HCC): ICD-10-CM

## 2021-10-21 PROBLEM — N18.4 CKD (CHRONIC KIDNEY DISEASE) STAGE 4, GFR 15-29 ML/MIN (HCC): Status: RESOLVED | Noted: 2021-08-16 | Resolved: 2021-10-21

## 2021-10-21 PROCEDURE — 90662 IIV NO PRSV INCREASED AG IM: CPT

## 2021-10-21 PROCEDURE — 99214 OFFICE O/P EST MOD 30 MIN: CPT | Performed by: FAMILY MEDICINE

## 2021-10-21 PROCEDURE — G0008 ADMIN INFLUENZA VIRUS VAC: HCPCS

## 2021-10-21 RX ORDER — ACETAMINOPHEN 325 MG/1
650 TABLET ORAL EVERY 6 HOURS PRN
COMMUNITY

## 2021-10-28 DIAGNOSIS — F02.80 LATE ONSET ALZHEIMER'S DISEASE WITHOUT BEHAVIORAL DISTURBANCE (HCC): ICD-10-CM

## 2021-10-28 DIAGNOSIS — R11.2 NAUSEA & VOMITING: ICD-10-CM

## 2021-10-28 DIAGNOSIS — G30.1 LATE ONSET ALZHEIMER'S DISEASE WITHOUT BEHAVIORAL DISTURBANCE (HCC): ICD-10-CM

## 2021-10-28 RX ORDER — PANTOPRAZOLE SODIUM 40 MG/1
TABLET, DELAYED RELEASE ORAL
Qty: 90 TABLET | Refills: 1 | Status: SHIPPED | OUTPATIENT
Start: 2021-10-28 | End: 2022-04-18 | Stop reason: SDUPTHER

## 2021-10-28 RX ORDER — MEMANTINE HYDROCHLORIDE 10 MG/1
TABLET ORAL
Qty: 180 TABLET | Refills: 1 | Status: SHIPPED | OUTPATIENT
Start: 2021-10-28 | End: 2022-05-06

## 2021-11-03 ENCOUNTER — APPOINTMENT (OUTPATIENT)
Dept: LAB | Facility: HOSPITAL | Age: 81
End: 2021-11-03
Payer: COMMERCIAL

## 2021-11-03 DIAGNOSIS — N18.32 STAGE 3B CHRONIC KIDNEY DISEASE (HCC): ICD-10-CM

## 2021-11-03 DIAGNOSIS — M89.9 CHRONIC KIDNEY DISEASE-MINERAL AND BONE DISORDER: ICD-10-CM

## 2021-11-03 DIAGNOSIS — N18.9 CHRONIC KIDNEY DISEASE-MINERAL AND BONE DISORDER: ICD-10-CM

## 2021-11-03 DIAGNOSIS — E83.9 CHRONIC KIDNEY DISEASE-MINERAL AND BONE DISORDER: ICD-10-CM

## 2021-11-03 LAB
25(OH)D3 SERPL-MCNC: 45.3 NG/ML (ref 30–100)
ANION GAP SERPL CALCULATED.3IONS-SCNC: 11 MMOL/L (ref 4–13)
BACTERIA UR QL AUTO: ABNORMAL /HPF
BASOPHILS # BLD AUTO: 0.11 THOUSANDS/ΜL (ref 0–0.1)
BASOPHILS NFR BLD AUTO: 2 % (ref 0–1)
BILIRUB UR QL STRIP: NEGATIVE
BUN SERPL-MCNC: 23 MG/DL (ref 5–25)
CALCIUM SERPL-MCNC: 9.2 MG/DL (ref 8.3–10.1)
CHLORIDE SERPL-SCNC: 104 MMOL/L (ref 100–108)
CLARITY UR: CLEAR
CO2 SERPL-SCNC: 29 MMOL/L (ref 21–32)
COLOR UR: COLORLESS
CREAT SERPL-MCNC: 1.52 MG/DL (ref 0.6–1.3)
CREAT UR-MCNC: 20.5 MG/DL
CREAT UR-MCNC: 20.5 MG/DL
EOSINOPHIL # BLD AUTO: 0.21 THOUSAND/ΜL (ref 0–0.61)
EOSINOPHIL NFR BLD AUTO: 3 % (ref 0–6)
ERYTHROCYTE [DISTWIDTH] IN BLOOD BY AUTOMATED COUNT: 12.3 % (ref 11.6–15.1)
GFR SERPL CREATININE-BSD FRML MDRD: 31 ML/MIN/1.73SQ M
GLUCOSE P FAST SERPL-MCNC: 89 MG/DL (ref 65–99)
GLUCOSE UR STRIP-MCNC: NEGATIVE MG/DL
HCT VFR BLD AUTO: 36.6 % (ref 34.8–46.1)
HGB BLD-MCNC: 11.9 G/DL (ref 11.5–15.4)
HGB UR QL STRIP.AUTO: NEGATIVE
IMM GRANULOCYTES # BLD AUTO: 0.01 THOUSAND/UL (ref 0–0.2)
IMM GRANULOCYTES NFR BLD AUTO: 0 % (ref 0–2)
KETONES UR STRIP-MCNC: NEGATIVE MG/DL
LEUKOCYTE ESTERASE UR QL STRIP: ABNORMAL
LYMPHOCYTES # BLD AUTO: 1.95 THOUSANDS/ΜL (ref 0.6–4.47)
LYMPHOCYTES NFR BLD AUTO: 28 % (ref 14–44)
MCH RBC QN AUTO: 32.2 PG (ref 26.8–34.3)
MCHC RBC AUTO-ENTMCNC: 32.5 G/DL (ref 31.4–37.4)
MCV RBC AUTO: 99 FL (ref 82–98)
MICROALBUMIN UR-MCNC: 20.7 MG/L (ref 0–20)
MICROALBUMIN/CREAT 24H UR: 101 MG/G CREATININE (ref 0–30)
MONOCYTES # BLD AUTO: 0.53 THOUSAND/ΜL (ref 0.17–1.22)
MONOCYTES NFR BLD AUTO: 8 % (ref 4–12)
NEUTROPHILS # BLD AUTO: 4.22 THOUSANDS/ΜL (ref 1.85–7.62)
NEUTS SEG NFR BLD AUTO: 59 % (ref 43–75)
NITRITE UR QL STRIP: NEGATIVE
NON-SQ EPI CELLS URNS QL MICRO: ABNORMAL /HPF
NRBC BLD AUTO-RTO: 0 /100 WBCS
PH UR STRIP.AUTO: 5.5 [PH]
PHOSPHATE SERPL-MCNC: 3.6 MG/DL (ref 2.3–4.1)
PLATELET # BLD AUTO: 338 THOUSANDS/UL (ref 149–390)
PMV BLD AUTO: 9.5 FL (ref 8.9–12.7)
POTASSIUM SERPL-SCNC: 4.5 MMOL/L (ref 3.5–5.3)
PROT UR STRIP-MCNC: NEGATIVE MG/DL
PROT UR-MCNC: <6 MG/DL
PROT/CREAT UR: <0.29 MG/G{CREAT} (ref 0–0.1)
PTH-INTACT SERPL-MCNC: 35.1 PG/ML (ref 18.4–80.1)
RBC # BLD AUTO: 3.7 MILLION/UL (ref 3.81–5.12)
RBC #/AREA URNS AUTO: ABNORMAL /HPF
SODIUM SERPL-SCNC: 144 MMOL/L (ref 136–145)
SP GR UR STRIP.AUTO: <=1.005 (ref 1–1.03)
UROBILINOGEN UR QL STRIP.AUTO: 0.2 E.U./DL
WBC # BLD AUTO: 7.03 THOUSAND/UL (ref 4.31–10.16)
WBC #/AREA URNS AUTO: ABNORMAL /HPF

## 2021-11-03 PROCEDURE — 84100 ASSAY OF PHOSPHORUS: CPT

## 2021-11-03 PROCEDURE — 80048 BASIC METABOLIC PNL TOTAL CA: CPT

## 2021-11-03 PROCEDURE — 84156 ASSAY OF PROTEIN URINE: CPT

## 2021-11-03 PROCEDURE — 83970 ASSAY OF PARATHORMONE: CPT

## 2021-11-03 PROCEDURE — 82306 VITAMIN D 25 HYDROXY: CPT

## 2021-11-03 PROCEDURE — 82043 UR ALBUMIN QUANTITATIVE: CPT | Performed by: FAMILY MEDICINE

## 2021-11-03 PROCEDURE — 82570 ASSAY OF URINE CREATININE: CPT

## 2021-11-03 PROCEDURE — 36415 COLL VENOUS BLD VENIPUNCTURE: CPT

## 2021-11-03 PROCEDURE — 85025 COMPLETE CBC W/AUTO DIFF WBC: CPT

## 2021-11-03 PROCEDURE — 82570 ASSAY OF URINE CREATININE: CPT | Performed by: FAMILY MEDICINE

## 2021-11-03 PROCEDURE — 81001 URINALYSIS AUTO W/SCOPE: CPT | Performed by: FAMILY MEDICINE

## 2021-11-05 ENCOUNTER — TELEPHONE (OUTPATIENT)
Dept: FAMILY MEDICINE CLINIC | Facility: CLINIC | Age: 81
End: 2021-11-05

## 2021-11-11 DIAGNOSIS — M85.80 OSTEOPENIA, UNSPECIFIED LOCATION: ICD-10-CM

## 2021-11-11 DIAGNOSIS — S32.010G COMPRESSION FRACTURE OF L1 VERTEBRA WITH DELAYED HEALING, SUBSEQUENT ENCOUNTER: ICD-10-CM

## 2021-11-11 RX ORDER — ALENDRONATE SODIUM 70 MG/1
TABLET ORAL
Qty: 4 TABLET | Refills: 5 | Status: SHIPPED | OUTPATIENT
Start: 2021-11-11 | End: 2022-06-05

## 2021-11-12 ENCOUNTER — TELEPHONE (OUTPATIENT)
Dept: NEPHROLOGY | Facility: CLINIC | Age: 81
End: 2021-11-12

## 2021-11-15 ENCOUNTER — TELEPHONE (OUTPATIENT)
Dept: NEPHROLOGY | Facility: CLINIC | Age: 81
End: 2021-11-15

## 2021-11-16 ENCOUNTER — OFFICE VISIT (OUTPATIENT)
Dept: NEPHROLOGY | Facility: CLINIC | Age: 81
End: 2021-11-16
Payer: COMMERCIAL

## 2021-11-16 VITALS
BODY MASS INDEX: 22.49 KG/M2 | RESPIRATION RATE: 16 BRPM | WEIGHT: 122.2 LBS | HEART RATE: 68 BPM | HEIGHT: 62 IN | SYSTOLIC BLOOD PRESSURE: 130 MMHG | TEMPERATURE: 96.4 F | DIASTOLIC BLOOD PRESSURE: 80 MMHG

## 2021-11-16 DIAGNOSIS — N18.9 CHRONIC KIDNEY DISEASE-MINERAL AND BONE DISORDER: ICD-10-CM

## 2021-11-16 DIAGNOSIS — R09.89 LABILE HYPERTENSION: ICD-10-CM

## 2021-11-16 DIAGNOSIS — E83.9 CHRONIC KIDNEY DISEASE-MINERAL AND BONE DISORDER: ICD-10-CM

## 2021-11-16 DIAGNOSIS — M89.9 CHRONIC KIDNEY DISEASE-MINERAL AND BONE DISORDER: ICD-10-CM

## 2021-11-16 DIAGNOSIS — N18.32 STAGE 3B CHRONIC KIDNEY DISEASE (HCC): Primary | ICD-10-CM

## 2021-11-16 PROCEDURE — 99214 OFFICE O/P EST MOD 30 MIN: CPT | Performed by: INTERNAL MEDICINE

## 2021-11-19 ENCOUNTER — OFFICE VISIT (OUTPATIENT)
Dept: FAMILY MEDICINE CLINIC | Facility: CLINIC | Age: 81
End: 2021-11-19
Payer: COMMERCIAL

## 2021-11-19 ENCOUNTER — HOSPITAL ENCOUNTER (OUTPATIENT)
Dept: RADIOLOGY | Facility: HOSPITAL | Age: 81
Discharge: HOME | End: 2021-11-19
Payer: COMMERCIAL

## 2021-11-19 VITALS
SYSTOLIC BLOOD PRESSURE: 132 MMHG | RESPIRATION RATE: 16 BRPM | OXYGEN SATURATION: 98 % | TEMPERATURE: 98.3 F | WEIGHT: 123.4 LBS | DIASTOLIC BLOOD PRESSURE: 76 MMHG | BODY MASS INDEX: 22.71 KG/M2 | HEIGHT: 62 IN | HEART RATE: 81 BPM

## 2021-11-19 DIAGNOSIS — S89.91XA INJURY OF RIGHT SHIN, INITIAL ENCOUNTER: ICD-10-CM

## 2021-11-19 DIAGNOSIS — M81.0 OSTEOPOROSIS, UNSPECIFIED OSTEOPOROSIS TYPE, UNSPECIFIED PATHOLOGICAL FRACTURE PRESENCE: ICD-10-CM

## 2021-11-19 DIAGNOSIS — S89.91XA INJURY OF RIGHT SHIN, INITIAL ENCOUNTER: Primary | ICD-10-CM

## 2021-11-19 PROCEDURE — 3078F DIAST BP <80 MM HG: CPT | Performed by: FAMILY MEDICINE

## 2021-11-19 PROCEDURE — 1160F RVW MEDS BY RX/DR IN RCRD: CPT | Performed by: FAMILY MEDICINE

## 2021-11-19 PROCEDURE — 99214 OFFICE O/P EST MOD 30 MIN: CPT | Performed by: FAMILY MEDICINE

## 2021-11-19 PROCEDURE — 3075F SYST BP GE 130 - 139MM HG: CPT | Performed by: FAMILY MEDICINE

## 2021-11-19 PROCEDURE — 73590 X-RAY EXAM OF LOWER LEG: CPT

## 2021-12-01 ENCOUNTER — HOSPITAL ENCOUNTER (OUTPATIENT)
Dept: NON INVASIVE DIAGNOSTICS | Facility: HOSPITAL | Age: 81
Discharge: HOME | End: 2021-12-01
Payer: COMMERCIAL

## 2021-12-01 VITALS
HEART RATE: 81 BPM | BODY MASS INDEX: 22.63 KG/M2 | SYSTOLIC BLOOD PRESSURE: 132 MMHG | DIASTOLIC BLOOD PRESSURE: 76 MMHG | HEIGHT: 62 IN | WEIGHT: 123 LBS

## 2021-12-01 DIAGNOSIS — I35.8 AORTIC VALVE SCLEROSIS: ICD-10-CM

## 2021-12-01 LAB
AORTIC ROOT: 3 CM
AORTIC VALVE MEAN VELOCITY: 15.8 M/S
APICAL FOUR CHAMBER EJECTION FRACTION: 72 %
ASCENDING AORTA: 3.2 CM
AV AREA BY CONTINUOUS VTI: 1.5 CM2
AV AREA PEAK VELOCITY: 1.4 CM2
AV LVOT MEAN GRADIENT: 2 MMHG
AV LVOT PEAK GRADIENT: 4 MMHG
AV MEAN GRADIENT: 12 MMHG
AV PEAK GRADIENT: 23 MMHG
AV REGURGITATION PRESSURE HALF TIME: 0.71 MS
AV VALVE AREA: 1.54 CM2
DOP CALC AO VTI: 46.82 CM
DOP CALC LVOT AREA: 3.14 CM2
DOP CALC LVOT DIAMETER: 2 CM
DOP CALC LVOT PEAK VEL VTI: 22.92 CM
DOP CALC LVOT PEAK VEL: 1.04 M/S
DOP CALC LVOT STROKE INDEX: 47.1 ML/M2
DOP CALC LVOT STROKE VOLUME: 71.97 CM3
E WAVE DECELERATION TIME: 235 MS
FRACTIONAL SHORTENING: 34 % (ref 28–44)
INTERVENTRICULAR SEPTUM IN DIASTOLE (PARASTERNAL SHORT AXIS VIEW): 1 CM
LEFT ATRIUM AREA SYSTOLE SINGLE PLANE A4C: 13.7 CM2
LEFT INTERNAL DIMENSION IN SYSTOLE: 2.1 CM (ref 2.1–4)
LEFT VENTRICULAR INTERNAL DIMENSION IN DIASTOLE: 3.2 CM (ref 3.69–5.49)
LEFT VENTRICULAR POSTERIOR WALL IN END DIASTOLE: 0.9 CM
LEFT VENTRICULAR STROKE VOLUME: 25 ML
MV E'TISSUE VEL-SEP: 7 CM/S
MV PEAK A VEL: 0.99 M/S
MV PEAK E VEL: 94 CM/S
MV STENOSIS PRESSURE HALF TIME: 0 MS
RIGHT ATRIUM AREA SYSTOLE A4C: 9.6 CM2
RIGHT VENTRICLE ID DIMENSION: 2.5 CM
SL CV AV DECELERATION TIME RETROGRADE: 2443 MS
SL CV AV PEAK GRADIENT RETROGRADE: 54 MMHG
SL CV PED ECHO LEFT VENTRICLE DIASTOLIC VOLUME (MOD BIPLANE) 2D: 40 ML
SL CV PED ECHO LEFT VENTRICLE SYSTOLIC VOLUME (MOD BIPLANE) 2D: 14 ML
TRICUSPID VALVE PEAK REGURGITATION VELOCITY: 2.46 M/S
TRICUSPID VALVE S': 1 CM/S
TV PEAK GRADIENT: 24 MMHG
Z-SCORE OF LEFT VENTRICULAR DIMENSION IN END SYSTOLE: -3.41

## 2021-12-01 PROCEDURE — 93306 TTE W/DOPPLER COMPLETE: CPT

## 2021-12-01 PROCEDURE — 93306 TTE W/DOPPLER COMPLETE: CPT | Performed by: INTERNAL MEDICINE

## 2021-12-16 ENCOUNTER — OFFICE VISIT (OUTPATIENT)
Dept: FAMILY MEDICINE CLINIC | Facility: CLINIC | Age: 81
End: 2021-12-16
Payer: COMMERCIAL

## 2021-12-16 VITALS
BODY MASS INDEX: 22.45 KG/M2 | WEIGHT: 122 LBS | HEART RATE: 80 BPM | TEMPERATURE: 98.2 F | DIASTOLIC BLOOD PRESSURE: 60 MMHG | SYSTOLIC BLOOD PRESSURE: 130 MMHG | HEIGHT: 62 IN | RESPIRATION RATE: 12 BRPM | OXYGEN SATURATION: 97 %

## 2021-12-16 DIAGNOSIS — M81.0 OSTEOPOROSIS, UNSPECIFIED OSTEOPOROSIS TYPE, UNSPECIFIED PATHOLOGICAL FRACTURE PRESENCE: ICD-10-CM

## 2021-12-16 DIAGNOSIS — R09.89 LABILE HYPERTENSION: Primary | ICD-10-CM

## 2021-12-16 DIAGNOSIS — G30.1 LATE ONSET ALZHEIMER'S DEMENTIA WITHOUT BEHAVIORAL DISTURBANCE (HCC): ICD-10-CM

## 2021-12-16 DIAGNOSIS — H61.23 BILATERAL IMPACTED CERUMEN: ICD-10-CM

## 2021-12-16 DIAGNOSIS — F02.80 LATE ONSET ALZHEIMER'S DEMENTIA WITHOUT BEHAVIORAL DISTURBANCE (HCC): ICD-10-CM

## 2021-12-16 PROCEDURE — 1160F RVW MEDS BY RX/DR IN RCRD: CPT | Performed by: FAMILY MEDICINE

## 2021-12-16 PROCEDURE — 3075F SYST BP GE 130 - 139MM HG: CPT | Performed by: FAMILY MEDICINE

## 2021-12-16 PROCEDURE — 99214 OFFICE O/P EST MOD 30 MIN: CPT | Performed by: FAMILY MEDICINE

## 2021-12-16 PROCEDURE — 3078F DIAST BP <80 MM HG: CPT | Performed by: FAMILY MEDICINE

## 2022-01-19 ENCOUNTER — RA CDI HCC (OUTPATIENT)
Dept: OTHER | Facility: HOSPITAL | Age: 82
End: 2022-01-19

## 2022-01-19 NOTE — PROGRESS NOTES
[facility]       Chart reviewed, no opportunity found: CHART REVIEWED, NO OPPORTUNITY FOUND                        Patients insurance company: [employer]

## 2022-01-28 ENCOUNTER — IMMUNIZATIONS (OUTPATIENT)
Dept: FAMILY MEDICINE CLINIC | Facility: HOSPITAL | Age: 82
End: 2022-01-28

## 2022-01-28 DIAGNOSIS — Z23 ENCOUNTER FOR IMMUNIZATION: Primary | ICD-10-CM

## 2022-01-28 PROCEDURE — 0001A COVID-19 PFIZER VACC 0.3 ML: CPT

## 2022-01-28 PROCEDURE — 91300 COVID-19 PFIZER VACC 0.3 ML: CPT

## 2022-03-11 ENCOUNTER — OFFICE VISIT (OUTPATIENT)
Dept: NEUROLOGY | Facility: CLINIC | Age: 82
End: 2022-03-11
Payer: COMMERCIAL

## 2022-03-11 VITALS
TEMPERATURE: 97.8 F | HEIGHT: 62 IN | SYSTOLIC BLOOD PRESSURE: 114 MMHG | DIASTOLIC BLOOD PRESSURE: 66 MMHG | BODY MASS INDEX: 22.45 KG/M2 | WEIGHT: 122 LBS | OXYGEN SATURATION: 97 % | HEART RATE: 77 BPM

## 2022-03-11 DIAGNOSIS — F02.80 LATE ONSET ALZHEIMER'S DEMENTIA WITHOUT BEHAVIORAL DISTURBANCE (HCC): Primary | ICD-10-CM

## 2022-03-11 DIAGNOSIS — E23.6 PITUITARY MASS (HCC): ICD-10-CM

## 2022-03-11 DIAGNOSIS — G30.1 LATE ONSET ALZHEIMER'S DEMENTIA WITHOUT BEHAVIORAL DISTURBANCE (HCC): Primary | ICD-10-CM

## 2022-03-11 PROCEDURE — 3074F SYST BP LT 130 MM HG: CPT | Performed by: PSYCHIATRY & NEUROLOGY

## 2022-03-11 PROCEDURE — 3078F DIAST BP <80 MM HG: CPT | Performed by: PSYCHIATRY & NEUROLOGY

## 2022-03-11 PROCEDURE — 1160F RVW MEDS BY RX/DR IN RCRD: CPT | Performed by: PSYCHIATRY & NEUROLOGY

## 2022-03-11 PROCEDURE — 99214 OFFICE O/P EST MOD 30 MIN: CPT | Performed by: PSYCHIATRY & NEUROLOGY

## 2022-03-11 NOTE — PROGRESS NOTES
Tita Santana is a 78 y o  female  Chief Complaint   Patient presents with    Late onset Alzheimer's disease without behavioral disturbanc       Assessment:  1  Late onset Alzheimer's dementia without behavioral disturbance ([address] )    2  Pituitary mass (HCC)        Plan:  Continue with Namenda 10 mg twice a day  Continue with mentally stimulating exercises  Follow-up in 6 months  Discussion:  Patient was advised to continue with Namenda 10 mg twice a day, continue with mentally stimulating exercises, she has not tolerated Aricept and Exelon in the past she was advised to continue follow-up with the neurosurgeon regarding her pituitary mass, was advised to be under constant supervision and continue with mentally stimulating exercises to keep her blood pressure cholesterol and sugar under control to go to the hospital if has any worsening symptoms and call me otherwise to see me back in 6 months and follow up with the other physicians      Subjective:    HPI   Patient is here in follow-up for her memory difficulty, she is accompanied with her son since her last visit she tells me that she is doing good she is able to do her ADLs her appetite is good weight has been stable she denies any bowel and bladder incontinence sleep is good, no hallucinations, she said that she feels better compared to before appetite is good sleep is good no hallucinations no other complaints,    Vitals:    03/11/22 1204   BP: 114/66   BP Location: Right arm   Patient Position: Sitting   Cuff Size: Standard   Pulse: 77   Temp: 97 8 °F (36 6 °C)   TempSrc: Temporal   SpO2: 97%   Weight: 55 3 kg (122 lb)   Height: 5' 1 5" (1 562 m)       Current Medications    Current Outpatient Medications:     acetaminophen (TYLENOL) 325 mg tablet, Take 650 mg by mouth every 6 (six) hours as needed, Disp: , Rfl:     alendronate (FOSAMAX) 70 mg tablet, TAKE 1 TABLET EVERY 7 DAYS, Disp: 4 tablet, Rfl: 5    aspirin 81 MG tablet, Take 1 tablet (81 mg total) by mouth daily, Disp: , Rfl:     cholecalciferol (VITAMIN D3) 1,000 units tablet, Take 2,000 Units by mouth daily, Disp: , Rfl:     memantine (NAMENDA) 10 mg tablet, TAKE ONE TABLET BY MOUTH TWICE A DAY, Disp: 180 tablet, Rfl: 1    mirtazapine (REMERON) 15 mg tablet, TAKE ONE TABLET BY MOUTH EVERY DAY AT BEDTIME, Disp: 30 tablet, Rfl: 5    pantoprazole (PROTONIX) 40 mg tablet, TAKE ONE TABLET BY MOUTH EVERY DAY, Disp: 90 tablet, Rfl: 1      Allergies  Cefazolin    Past Medical History  Past Medical History:   Diagnosis Date    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     Aortic aneurysm (HCC)     4 1 cm on December 6, 2018    Chronic kidney disease     CKD (chronic kidney disease) stage 2, GFR 60-89 ml/min     Colon polyps     Dementia (HCC)     Hyperlipidemia     Hypertension     Insomnia     Memory loss     Osteopenia with elevated frax score     Osteoporosis     Tubular adenoma of colon 04/2019    Uterine fibroid          Past Surgical History:  Past Surgical History:   Procedure Laterality Date    COLONOSCOPY  3665    date not certain     FOOT SURGERY      SD COLONOSCOPY FLX DX W/COLLJ SPEC WHEN PFRMD N/A 4/24/2019    Procedure: COLONOSCOPY;  Surgeon: George Hodge MD;  Location: [facility]; Service: Gastroenterology    SD ESOPHAGOGASTRODUODENOSCOPY TRANSORAL DIAGNOSTIC N/A 4/24/2019    Procedure: ESOPHAGOGASTRODUODENOSCOPY (EGD); Surgeon: George Hodge MD;  Location[facility]; Service: Gastroenterology         Family History:  Family History   Problem Relation Age of Onset    Cancer Mother     Cancer Father        Social History:   reports that she has never smoked  She has never used smokeless tobacco  She reports current alcohol use of about 7 0 standard drinks of alcohol per week  She reports that she does not use drugs      I have reviewed the past medical history, surgical history, social and family history, current medications, allergies vitals, review of systems, and updated this information as appropriate today  Objective:    Physical Exam    Neurological Exam    GENERAL:  Cooperative in no acute distress  Well-developed and well-nourished    HEAD and NECK   Head is atraumatic normocephalic with no lesions or masses  Neck is supple with full range of motion    CARDIOVASCULAR  Carotid Arteries-no carotid bruits  NEUROLOGIC:  Mental Status-the patient is awake alert and oriented without aphasia or apraxia  Cranial Nerves: Visual fields are full to confrontation  Extraocular movements are full without nystagmus  Pupils are 2-1/2 mm and reactive  Face is symmetrical to light touch  Movements of facial expression move symmetrically  Hearing is normal to finger rub bilaterally  Soft palate lifts symmetrically  Shoulder shrug is symmetrical  Tongue is midline without atrophy  Motor: No drift is noted on arm extension  Strength is full in the upper and lower extremities with normal bulk and tone  Gait is unremarkable  ROS:  Review of Systems   Constitutional: Negative  Negative for appetite change and fever  HENT: Negative  Negative for hearing loss, tinnitus, trouble swallowing and voice change  Eyes: Negative  Negative for photophobia and pain  Respiratory: Negative  Negative for shortness of breath  Cardiovascular: Negative  Negative for palpitations  Gastrointestinal: Negative  Negative for nausea and vomiting  Endocrine: Negative  Negative for cold intolerance  Genitourinary: Negative  Negative for dysuria, frequency and urgency  Musculoskeletal: Positive for back pain and neck pain  Negative for myalgias  Skin: Negative  Negative for rash  Neurological: Negative  Negative for dizziness, tremors, seizures, syncope, facial asymmetry, speech difficulty, weakness, light-headedness, numbness and headaches  Hematological: Bruises/bleeds easily  Psychiatric/Behavioral: Positive for confusion   Negative for hallucinations and sleep disturbance

## 2022-03-25 ENCOUNTER — TELEPHONE (OUTPATIENT)
Dept: NEPHROLOGY | Facility: CLINIC | Age: 82
End: 2022-03-25

## 2022-03-31 ENCOUNTER — HOSPITAL ENCOUNTER (OUTPATIENT)
Dept: CT IMAGING | Facility: HOSPITAL | Age: 82
Discharge: HOME | End: 2022-03-31
Payer: COMMERCIAL

## 2022-03-31 DIAGNOSIS — I71.2 ASCENDING AORTIC ANEURYSM (HCC): ICD-10-CM

## 2022-03-31 PROCEDURE — 71250 CT THORAX DX C-: CPT

## 2022-04-05 ENCOUNTER — TELEPHONE (OUTPATIENT)
Dept: FAMILY MEDICINE CLINIC | Facility: CLINIC | Age: 82
End: 2022-04-05

## 2022-04-05 NOTE — TELEPHONE ENCOUNTER
----- Message from Adriana Ayers, [address] sent at 4/5/2022  2:36 PM EDT -----  Called patient granddaughter and n/a lmov for a call back

## 2022-04-18 ENCOUNTER — OFFICE VISIT (OUTPATIENT)
Dept: FAMILY MEDICINE CLINIC | Facility: CLINIC | Age: 82
End: 2022-04-18
Payer: COMMERCIAL

## 2022-04-18 VITALS
HEART RATE: 79 BPM | SYSTOLIC BLOOD PRESSURE: 106 MMHG | BODY MASS INDEX: 23.52 KG/M2 | TEMPERATURE: 97.2 F | WEIGHT: 127.8 LBS | HEIGHT: 62 IN | DIASTOLIC BLOOD PRESSURE: 68 MMHG | OXYGEN SATURATION: 98 %

## 2022-04-18 DIAGNOSIS — M54.50 CHRONIC MIDLINE LOW BACK PAIN WITHOUT SCIATICA: ICD-10-CM

## 2022-04-18 DIAGNOSIS — F34.1 DYSTHYMIA: ICD-10-CM

## 2022-04-18 DIAGNOSIS — G89.29 CHRONIC MIDLINE LOW BACK PAIN WITHOUT SCIATICA: ICD-10-CM

## 2022-04-18 DIAGNOSIS — G30.1 LATE ONSET ALZHEIMER'S DEMENTIA WITHOUT BEHAVIORAL DISTURBANCE (HCC): Primary | ICD-10-CM

## 2022-04-18 DIAGNOSIS — R11.2 NAUSEA & VOMITING: ICD-10-CM

## 2022-04-18 DIAGNOSIS — R63.4 WEIGHT LOSS: ICD-10-CM

## 2022-04-18 DIAGNOSIS — H61.23 BILATERAL IMPACTED CERUMEN: ICD-10-CM

## 2022-04-18 DIAGNOSIS — I71.2 ASCENDING AORTIC ANEURYSM (HCC): ICD-10-CM

## 2022-04-18 DIAGNOSIS — H60.503 ACUTE OTITIS EXTERNA OF BOTH EARS, UNSPECIFIED TYPE: ICD-10-CM

## 2022-04-18 DIAGNOSIS — N18.32 STAGE 3B CHRONIC KIDNEY DISEASE (HCC): ICD-10-CM

## 2022-04-18 DIAGNOSIS — F02.80 LATE ONSET ALZHEIMER'S DEMENTIA WITHOUT BEHAVIORAL DISTURBANCE (HCC): Primary | ICD-10-CM

## 2022-04-18 PROBLEM — R42 DIZZINESS: Status: RESOLVED | Noted: 2018-12-05 | Resolved: 2022-04-18

## 2022-04-18 PROBLEM — R06.2 WHEEZE: Status: RESOLVED | Noted: 2020-09-11 | Resolved: 2022-04-18

## 2022-04-18 PROCEDURE — 99214 OFFICE O/P EST MOD 30 MIN: CPT | Performed by: NURSE PRACTITIONER

## 2022-04-18 PROCEDURE — 1160F RVW MEDS BY RX/DR IN RCRD: CPT | Performed by: NURSE PRACTITIONER

## 2022-04-18 RX ORDER — MIRTAZAPINE 15 MG/1
15 TABLET, FILM COATED ORAL
Qty: 90 TABLET | Refills: 1 | Status: SHIPPED | OUTPATIENT
Start: 2022-04-18 | End: 2022-05-27 | Stop reason: SDUPTHER

## 2022-04-18 RX ORDER — PANTOPRAZOLE SODIUM 40 MG/1
40 TABLET, DELAYED RELEASE ORAL DAILY
Qty: 90 TABLET | Refills: 1 | Status: SHIPPED | OUTPATIENT
Start: 2022-04-18

## 2022-04-18 NOTE — ASSESSMENT & PLAN NOTE
Lab Results   Component Value Date    EGFR 31 11/03/2021    EGFR 35 04/20/2021    EGFR 34 03/30/2021    CREATININE 1 52 (H) 11/03/2021    CREATININE 1 40 (H) 04/20/2021    CREATININE 1 42 (H) 03/30/2021   Pt follows with Nephrology

## 2022-04-18 NOTE — ASSESSMENT & PLAN NOTE
CT chest in March 2022, demonstrated ascending aortic aneurysm is unchanged from last year and stable, will repeat CT in 1 year

## 2022-04-18 NOTE — ASSESSMENT & PLAN NOTE
Continue to take Mirtazapine 15 mg at bedtime because it makes patient sleepy  Pt has gain 5 lbs since March  Pt denies any depression at this time  Will f/u in 6 months

## 2022-04-18 NOTE — PROGRESS NOTES
Assessment/Plan:     Chronic Problems:  Ascending aortic aneurysm ([facility] [address] )  CT chest in March 2022, demonstrated ascending aortic aneurysm is unchanged from last year and stable, will repeat CT in 1 year  Weight loss   Continue to take Mirtazapine 15 mg at bedtime because it makes patient sleepy  Pt has gain 5 lbs since March  Pt denies any depression at this time  Will f/u in 6 months  Stage 3b chronic kidney disease  Lab Results   Component Value Date    EGFR 31 11/03/2021    EGFR 35 04/20/2021    EGFR 34 03/30/2021    CREATININE 1 52 (H) 11/03/2021    CREATININE 1 40 (H) 04/20/2021    CREATININE 1 42 (H) 03/30/2021   Pt follows with Nephrology  Visit Diagnosis:  Diagnoses and all orders for this visit:    Late onset Alzheimer's dementia without behavioral disturbance ([facility] )  Comments: Follows with Neurology  Orders:  -     Ambulatory Referral to [facility]; Future    Bilateral impacted cerumen    Acute otitis externa of both ears, unspecified type  Comments:  Cortisporin ordered 4 drops in both ears every 6 hours   Orders:  -     neomycin-polymyxin-hydrocortisone (CORTISPORIN) otic solution; Administer 4 drops into both ears every 6 (six) hours    Chronic midline low back pain without sciatica  Comments:  PT/OT ordered per son, Sebas's request due to pt's c/o back pain  Orders:  -     Ambulatory Referral to [facility]; Future    Weight loss   Comments: Will start mirtazapine 15 mg hs  This will make her drowsy, but will also help with her depression  Orders:  -     mirtazapine (REMERON) 15 mg tablet; Take 1 tablet (15 mg total) by mouth daily at bedtime    Dysthymia  -     mirtazapine (REMERON) 15 mg tablet; Take 1 tablet (15 mg total) by mouth daily at bedtime    Nausea & vomiting  Comments:  Continue Protonix daily  Orders:  -     pantoprazole (PROTONIX) 40 mg tablet;  Take 1 tablet (40 mg total) by mouth daily    Stage 3b chronic kidney disease    Ascending [facility] ([facility] )    Other orders  -     Ear cerumen removal          Subjective:    Patient ID: Ángel Fisher is a 78 y o  female  Pt is a 77 y/o F who presents with her son for a 4 month f/u  Lives home by herself, has visiting nursing, [facility], typically nursing is there 8 hours during the day  Her son, Mario Bray, comes up once a week and will stay overnight with her  She is concerned with neck and back pain  Mario Bray would like PT/OT  Mario Bray is not concerned with her ADL's/walking  He is concerned with her memory as he feels it is certainly worsening  He is not concerned with her safety  The following portions of the patient's history were reviewed and updated as appropriate: allergies, current medications, past family history, past medical history, past social history, past surgical history and problem list     Review of Systems   Constitutional: Negative for chills, diaphoresis and fever  HENT: Negative for sinus pressure, sinus pain and sore throat  Eyes: Negative for pain and visual disturbance  Respiratory: Positive for cough (chronic dry cough)  Negative for shortness of breath  Cardiovascular: Negative for chest pain and palpitations  Gastrointestinal: Negative for constipation and diarrhea  Genitourinary: Positive for dysuria  Negative for difficulty urinating, frequency, hematuria and urgency  Musculoskeletal: Negative for arthralgias and back pain  Skin: Negative for color change and rash  Neurological: Negative for dizziness, light-headedness and headaches  Psychiatric/Behavioral: Positive for confusion  Negative for dysphoric mood and sleep disturbance  The patient is not nervous/anxious  All other systems reviewed and are negative          /68 (BP Location: Left arm, Patient Position: Sitting)   Pulse 79   Temp (!) 97 2 °F (36 2 °C) (Tympanic)   Ht 5' 1 5" (1 562 m)   Wt 58 kg (127 lb 12 8 oz)   LMP  (LMP Unknown)   SpO2 98%   BMI 23 76 kg/m²   Social History     Socioeconomic History    Marital status:      Spouse name: Not on file    Number of children: Not on file    Years of education: Not on file    Highest education level: Not on file   Occupational History    Not on file   Tobacco Use    Smoking status: Never Smoker    Smokeless tobacco: Never Used   Vaping Use    Vaping Use: Never used   Substance and Sexual Activity    Alcohol use: Yes     Alcohol/week: 7 0 standard drinks     Types: 7 Glasses of wine per week     Comment: socially     Drug use: Never    Sexual activity: Never   Other Topics Concern    Not on file   Social History Narrative    Not on file     Social Determinants of Health     Financial Resource Strain: Not on file   Food Insecurity: Not on file   Transportation Needs: Not on file   Physical Activity: Not on file   Stress: Not on file   Social Connections: Not on file   Intimate Partner Violence: Not on file   Housing Stability: Not on file     Past Medical History:   Diagnosis Date    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     Aortic aneurysm (HCC)     4 1 cm on December 6, 2018    Chronic kidney disease     CKD (chronic kidney disease) stage 2, GFR 60-89 ml/min     Colon polyps     Dementia ([address] )     Hyperlipidemia     Hypertension     Insomnia     Memory loss     Osteopenia with elevated frax score     Osteoporosis     Tubular adenoma of colon 04/2019    Uterine fibroid      Family History   Problem Relation Age of Onset    Cancer Mother     Cancer Father      Past Surgical History:   Procedure Laterality Date    COLONOSCOPY  5131    date not certain     FOOT SURGERY      MT COLONOSCOPY FLX DX W/COLLJ SPEC WHEN PFRMD N/A 4/24/2019    Procedure: COLONOSCOPY;  Surgeon: Federico Tafoya MD;  Location: [facility]; Service: Gastroenterology    MT ESOPHAGOGASTRODUODENOSCOPY TRANSORAL DIAGNOSTIC N/A 4/24/2019    Procedure: ESOPHAGOGASTRODUODENOSCOPY (EGD);   Surgeon: Federico Tafoya MD;  Location: [facility]; Service: Gastroenterology       Current Outpatient Medications:     acetaminophen (TYLENOL) 325 mg tablet, Take 650 mg by mouth every 6 (six) hours as needed, Disp: , Rfl:     alendronate (FOSAMAX) 70 mg tablet, TAKE 1 TABLET EVERY 7 DAYS, Disp: 4 tablet, Rfl: 5    aspirin 81 MG tablet, Take 1 tablet (81 mg total) by mouth daily, Disp: , Rfl:     cholecalciferol (VITAMIN D3) 1,000 units tablet, Take 2,000 Units by mouth daily, Disp: , Rfl:     memantine (NAMENDA) 10 mg tablet, TAKE ONE TABLET BY MOUTH TWICE A DAY, Disp: 180 tablet, Rfl: 1    mirtazapine (REMERON) 15 mg tablet, Take 1 tablet (15 mg total) by mouth daily at bedtime, Disp: 90 tablet, Rfl: 1    pantoprazole (PROTONIX) 40 mg tablet, Take 1 tablet (40 mg total) by mouth daily, Disp: 90 tablet, Rfl: 1    neomycin-polymyxin-hydrocortisone (CORTISPORIN) otic solution, Administer 4 drops into both ears every 6 (six) hours, Disp: 10 mL, Rfl: 0    Allergies   Allergen Reactions    Cefazolin      Other reaction(s): Unknown          Lab Review   Hospital Outpatient Visit on 12/01/2021   Component Date Value    TV S' 12/01/2021 1 0     AV area peak ondina 12/01/2021 1 4     AV peak gradient 12/01/2021 54     Aortic valve mean veloci* 12/01/2021 15 80     TV peak gradient 12/01/2021 24     Tricuspid valve peak reg* 12/01/2021 2 46     LVPWd 12/01/2021 0 90     MV E' Tissue Velocity Se* 12/01/2021 7     IVSd 12/01/2021 3 70     LV DIASTOLIC VOLUME (MOD* 72/05/8609 40     LEFT VENTRICLE SYSTOLIC * 57/90/7264 14     Left ventricular stroke * 12/01/2021 25 00     AV Deceleration Time 12/01/2021 2,443     A4C EF 12/01/2021 72     LVIDd 12/01/2021 3 20     LVIDS 12/01/2021 2 10     FS 12/01/2021 34     Asc Ao 12/01/2021 3 2     Ao root 12/01/2021 3 00     RVID d 12/01/2021 2 5     LVOT mn grad 12/01/2021 2 0     AV area by cont VTI 12/01/2021 1 5     AV regurgitation pressur* 12/01/2021 0 708     AV mean gradient 12/01/2021 12     AV LVOT peak gradient 12/01/2021 4     E wave deceleration time 12/01/2021 235     LVOT diameter 12/01/2021 2 0     LVOT peak manny 12/01/2021 1 04     LVOT peak VTI 12/01/2021 22 92     Ao VTI 12/01/2021 46 82     LVOT stroke volume 12/01/2021 71 97     AV peak gradient 12/01/2021 23 0     MV Peak E Manny 12/01/2021 94     MV Peak A Manny 12/01/2021 0 99     FABIOLA A4C 12/01/2021 13 7     RAA A4C 12/01/2021 9 6     MV stenosis pressure 1/2* 12/01/2021 0     LVOT SI 12/01/2021 47 10     LVOT area 12/01/2021 3 14     AV valve area 12/01/2021 1 54     ZLVIDS 12/01/2021 -3 41    Appointment on 11/03/2021   Component Date Value    Sodium 11/03/2021 144     Potassium 11/03/2021 4 5     Chloride 11/03/2021 104     CO2 11/03/2021 29     ANION GAP 11/03/2021 11     BUN 11/03/2021 23     Creatinine 11/03/2021 1 52*    Glucose, Fasting 11/03/2021 89     Calcium 11/03/2021 9 2     eGFR 11/03/2021 31     WBC 11/03/2021 7 03     RBC 11/03/2021 3 70*    Hemoglobin 11/03/2021 11 9     Hematocrit 11/03/2021 36 6     MCV 11/03/2021 99*    MCH 11/03/2021 32 2     MCHC 11/03/2021 32 5     RDW 11/03/2021 12 3     MPV 11/03/2021 9 5     Platelets 90/86/9223 338     nRBC 11/03/2021 0     Neutrophils Relative 11/03/2021 59     Immat GRANS % 11/03/2021 0     Lymphocytes Relative 11/03/2021 28     Monocytes Relative 11/03/2021 8     Eosinophils Relative 11/03/2021 3     Basophils Relative 11/03/2021 2*    Neutrophils Absolute 11/03/2021 4 22     Immature Grans Absolute 11/03/2021 0 01     Lymphocytes Absolute 11/03/2021 1 95     Monocytes Absolute 11/03/2021 0 53     Eosinophils Absolute 11/03/2021 0 21     Basophils Absolute 11/03/2021 0 11*    Phosphorus 11/03/2021 3 6     Creatinine, Ur 11/03/2021 20 5     Protein Urine Random 11/03/2021 <6     Prot/Creat Ratio, Ur 11/03/2021 <0 29*    PTH 11/03/2021 35 1     Vit D, 25-Hydroxy 11/03/2021 45 3         Imaging: CT chest wo contrast    Result Date: 4/3/2022  Narrative: CT CHEST WITHOUT IV CONTRAST INDICATION:   I71 2: Thoracic aortic aneurysm, without rupture  COMPARISON:  Chest CT from 3/26/2021  TECHNIQUE: Chest CT without intravenous contrast   Axial, sagittal, coronal 2D reformats and coronal MIPS from source data  Radiation dose length product (DLP):  233 mGy-cm   Radiation dose exposure minimized using iterative reconstruction and automated exposure control  FINDINGS: LUNGS:  No acute disease  Benign biapical pleural parenchymal scar  Stable mild bibasilar reticulation  Benign calcified granulomas  AIRWAYS: No significant filling defects  PLEURA:  Unremarkable  HEART/GREAT VESSELS:  Normal heart size  Calcification of the aortic valve leaflets with stable minimal ectasia of the ascending aorta at 4 0 cm  Moderate coronary artery calcification indicating atherosclerotic heart disease  MEDIASTINUM AND REMEDIOS:  Unremarkable  CHEST WALL AND LOWER NECK: Unremarkable  UPPER ABDOMEN:  Right renal scar  Colonic diverticuli  OSSEOUS STRUCTURES: Mild degenerative disease in the spine  Redemonstration of moderate compression deformity of L1  Impression: Minimal ectasia of the ascending aorta at 4 0 cm, stable since March 2021  Continue yearly follow-up if clinically warranted  Workstation performed: [id number]       Objective:      Physical Exam  Constitutional:       Appearance: She is well-developed  HENT:      Right Ear: Decreased hearing noted  There is impacted cerumen  Left Ear: Decreased hearing noted  There is impacted cerumen  Nose: Rhinorrhea present  Cardiovascular:      Rate and Rhythm: Normal rate and regular rhythm  Heart sounds: Murmur heard  Pulmonary:      Effort: Pulmonary effort is normal  No respiratory distress  Breath sounds: Examination of the right-lower field reveals decreased breath sounds  Examination of the left-lower field reveals decreased breath sounds  Decreased breath sounds present  Abdominal:      General: There is no distension  Palpations: Abdomen is soft  Tenderness: There is no abdominal tenderness  Musculoskeletal:      Right lower le+ Edema present  Left lower le+ Edema present  Skin:     General: Skin is warm and dry  Neurological:      Mental Status: She is confused  Gait: Gait normal        Ear cerumen removal    Date/Time: 2022 1:27 PM  Performed by: [place]  Authorized by: DASIA Hoover   Universal Protocol:  Consent: Verbal consent obtained  Consent given by: patient  Patient identity confirmed: verbally with patient      Patient location:  Clinic  Procedure details:     Procedure type: irrigation with instrumentation      Instrumentation: curette      Approach:  External  Post-procedure details:     Complication:  None    Patient tolerance of procedure: Tolerated well, no immediate complications        There are no Patient Instructions on file for this visit  DASIA Martines    Portions of the record may have been created with voice recognition software  Occasional wrong word or "sound a like" substitutions may have occurred due to the inherent limitations of voice recognition software  Read the chart carefully and recognize, using context, where substitutions have occurred

## 2022-04-20 ENCOUNTER — TELEPHONE (OUTPATIENT)
Dept: FAMILY MEDICINE CLINIC | Facility: CLINIC | Age: 82
End: 2022-04-20

## 2022-04-20 NOTE — TELEPHONE ENCOUNTER
Jana Wells from P/T [phone] called in, he stated this patient can not be seen for out patient services only for pain  Should be to for mobility   Please advise, Thank you

## 2022-05-06 DIAGNOSIS — G30.1 LATE ONSET ALZHEIMER'S DISEASE WITHOUT BEHAVIORAL DISTURBANCE (HCC): ICD-10-CM

## 2022-05-06 DIAGNOSIS — F02.80 LATE ONSET ALZHEIMER'S DISEASE WITHOUT BEHAVIORAL DISTURBANCE (HCC): ICD-10-CM

## 2022-05-06 RX ORDER — MEMANTINE HYDROCHLORIDE 10 MG/1
TABLET ORAL
Qty: 180 TABLET | Refills: 1 | Status: SHIPPED | OUTPATIENT
Start: 2022-05-06 | End: 2022-05-08

## 2022-05-08 DIAGNOSIS — G30.1 LATE ONSET ALZHEIMER'S DISEASE WITHOUT BEHAVIORAL DISTURBANCE (HCC): ICD-10-CM

## 2022-05-08 DIAGNOSIS — F02.80 LATE ONSET ALZHEIMER'S DISEASE WITHOUT BEHAVIORAL DISTURBANCE (HCC): ICD-10-CM

## 2022-05-08 RX ORDER — MEMANTINE HYDROCHLORIDE 10 MG/1
TABLET ORAL
Qty: 180 TABLET | Refills: 1 | Status: SHIPPED | OUTPATIENT
Start: 2022-05-08

## 2022-05-27 DIAGNOSIS — R63.4 WEIGHT LOSS: ICD-10-CM

## 2022-05-27 DIAGNOSIS — F34.1 DYSTHYMIA: ICD-10-CM

## 2022-05-27 RX ORDER — MIRTAZAPINE 15 MG/1
15 TABLET, FILM COATED ORAL
Qty: 90 TABLET | Refills: 1 | Status: SHIPPED | OUTPATIENT
Start: 2022-05-27

## 2022-05-27 NOTE — TELEPHONE ENCOUNTER
Pt's son, Marilou Palacio, called and left message on [employer]  Requested refill on his mother's mirtazapine 15 mg

## 2022-06-01 ENCOUNTER — TELEPHONE (OUTPATIENT)
Dept: NEPHROLOGY | Facility: CLINIC | Age: 82
End: 2022-06-01

## 2022-06-01 DIAGNOSIS — N18.32 STAGE 3B CHRONIC KIDNEY DISEASE (HCC): Primary | ICD-10-CM

## 2022-06-05 DIAGNOSIS — M85.80 OSTEOPENIA, UNSPECIFIED LOCATION: ICD-10-CM

## 2022-06-05 DIAGNOSIS — S32.010G COMPRESSION FRACTURE OF L1 VERTEBRA WITH DELAYED HEALING, SUBSEQUENT ENCOUNTER: ICD-10-CM

## 2022-06-06 RX ORDER — ALENDRONATE SODIUM 70 MG/1
70 TABLET ORAL
Qty: 4 TABLET | Refills: 5 | Status: SHIPPED | OUTPATIENT
Start: 2022-06-06 | End: 2022-07-06

## 2022-06-08 ENCOUNTER — OFFICE VISIT (OUTPATIENT)
Dept: NEPHROLOGY | Facility: CLINIC | Age: 82
End: 2022-06-08
Payer: COMMERCIAL

## 2022-06-08 VITALS
DIASTOLIC BLOOD PRESSURE: 70 MMHG | HEIGHT: 61 IN | HEART RATE: 82 BPM | TEMPERATURE: 96.9 F | BODY MASS INDEX: 24.92 KG/M2 | OXYGEN SATURATION: 97 % | WEIGHT: 132 LBS | RESPIRATION RATE: 16 BRPM | SYSTOLIC BLOOD PRESSURE: 140 MMHG

## 2022-06-08 DIAGNOSIS — M89.9 CHRONIC KIDNEY DISEASE-MINERAL AND BONE DISORDER: ICD-10-CM

## 2022-06-08 DIAGNOSIS — E83.9 CHRONIC KIDNEY DISEASE-MINERAL AND BONE DISORDER: ICD-10-CM

## 2022-06-08 DIAGNOSIS — N18.9 CHRONIC KIDNEY DISEASE-MINERAL AND BONE DISORDER: ICD-10-CM

## 2022-06-08 DIAGNOSIS — R09.89 LABILE HYPERTENSION: ICD-10-CM

## 2022-06-08 DIAGNOSIS — N18.32 STAGE 3B CHRONIC KIDNEY DISEASE (HCC): Primary | ICD-10-CM

## 2022-06-08 PROCEDURE — 1160F RVW MEDS BY RX/DR IN RCRD: CPT | Performed by: INTERNAL MEDICINE

## 2022-06-08 PROCEDURE — 99213 OFFICE O/P EST LOW 20 MIN: CPT | Performed by: INTERNAL MEDICINE

## 2022-06-08 NOTE — ASSESSMENT & PLAN NOTE
Lab Results   Component Value Date    EGFR 31 11/03/2021    EGFR 35 04/20/2021    EGFR 34 03/30/2021    CREATININE 1 52 (H) 11/03/2021    CREATININE 1 40 (H) 04/20/2021    CREATININE 1 42 (H) 03/30/2021   Do not have any new lab test to assess kidney function  They will get blood test sometime this week    Advised to get regular blood test as kidney disease quite asymptomatic and progressive

## 2022-06-08 NOTE — ASSESSMENT & PLAN NOTE
Lab Results   Component Value Date    EGFR 31 11/03/2021    EGFR 35 04/20/2021    EGFR 34 03/30/2021    CREATININE 1 52 (H) 11/03/2021    CREATININE 1 40 (H) 04/20/2021    CREATININE 1 42 (H) 03/30/2021   Again as she does not a blood test will keep eye on the blood work when it come back

## 2022-06-08 NOTE — PROGRESS NOTES
NEPHROLOGY OFFICE FOLLOW UP  Severa Higashi 78 y o  female MRN: [id number]    Encounter: [id number] 6/8/2022    REASON FOR VISIT: Severa Higashi is a 78 y o  female who is here on 6/8/2022 for Chronic Kidney Disease and Follow-up    HPI:    Fransisca Harris came in today for follow-up of CKD  [de-identified] year woman who does have dementia and accompanied by caretaker    Denies any complaint and as per caretaker she is doing quite well    Patient did not have any blood work done    Though clinically she does appear to be quite stable     No chest pain no palpitation or shortness of breath     Denies any urinary complaint      REVIEW OF SYSTEMS:    Review of Systems   Constitutional: Negative for activity change and fatigue  HENT: Negative for congestion and ear discharge  Eyes: Negative for photophobia and pain  Respiratory: Negative for apnea and choking  Cardiovascular: Negative for chest pain and palpitations  Gastrointestinal: Negative for abdominal distention and blood in stool  Endocrine: Negative for heat intolerance and polyphagia  Genitourinary: Negative for flank pain and urgency  Musculoskeletal: Negative for neck pain and neck stiffness  Skin: Negative for color change and wound  Allergic/Immunologic: Negative for food allergies and immunocompromised state  Neurological: Negative for seizures and facial asymmetry  Hematological: Negative for adenopathy  Does not bruise/bleed easily  Psychiatric/Behavioral: Negative for self-injury and suicidal ideas           PAST MEDICAL HISTORY:  Past Medical History:   Diagnosis Date    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     Aortic aneurysm (HCC)     4 1 cm on December 6, 2018    Chronic kidney disease     CKD (chronic kidney disease) stage 2, GFR 60-89 ml/min     Colon polyps     Dementia (HCC)     Hyperlipidemia     Hypertension     Insomnia     Memory loss     Osteopenia with elevated frax score     Osteoporosis     Tubular adenoma of colon 04/2019    Uterine fibroid        PAST SURGICAL HISTORY:  Past Surgical History:   Procedure Laterality Date    COLONOSCOPY  6228    date not certain     FOOT SURGERY      GA COLONOSCOPY FLX DX W/COLLJ SPEC WHEN PFRMD N/A 4/24/2019    Procedure: COLONOSCOPY;  Surgeon: Kelsey Floyd MD;  Location: [facility]; Service: Gastroenterology    GA ESOPHAGOGASTRODUODENOSCOPY TRANSORAL DIAGNOSTIC N/A 4/24/2019    Procedure: ESOPHAGOGASTRODUODENOSCOPY (EGD); Surgeon: Kelsey Floyd MD;  Location: [facility];   Service: Gastroenterology       SOCIAL HISTORY:  Social History     Substance and Sexual Activity   Alcohol Use Yes    Alcohol/week: 7 0 standard drinks    Types: 7 Glasses of wine per week    Comment: socially      Social History     Substance and Sexual Activity   Drug Use Never     Social History     Tobacco Use   Smoking Status Never Smoker   Smokeless Tobacco Never Used       FAMILY HISTORY:  Family History   Problem Relation Age of Onset    Cancer Mother     Cancer Father        MEDICATIONS:    Current Outpatient Medications:     acetaminophen (TYLENOL) 325 mg tablet, Take 650 mg by mouth every 6 (six) hours as needed, Disp: , Rfl:     alendronate (FOSAMAX) 70 mg tablet, Take 1 tablet (70 mg total) by mouth every 7 days, Disp: 4 tablet, Rfl: 5    aspirin 81 MG tablet, Take 1 tablet (81 mg total) by mouth daily, Disp: , Rfl:     cholecalciferol (VITAMIN D3) 1,000 units tablet, Take 2,000 Units by mouth daily, Disp: , Rfl:     memantine (NAMENDA) 10 mg tablet, TAKE ONE TABLET BY MOUTH TWICE A DAY, Disp: 180 tablet, Rfl: 1    mirtazapine (REMERON) 15 mg tablet, Take 1 tablet (15 mg total) by mouth daily at bedtime, Disp: 90 tablet, Rfl: 1    pantoprazole (PROTONIX) 40 mg tablet, Take 1 tablet (40 mg total) by mouth daily, Disp: 90 tablet, Rfl: 1    neomycin-polymyxin-hydrocortisone (CORTISPORIN) otic solution, Administer 4 drops into both ears every 6 (six) hours, Disp: 10 mL, Rfl: 0    PHYSICAL EXAM:  Vitals:    06/08/22 1329   BP: 140/70   BP Location: Right arm   Patient Position: Sitting   Pulse: 82   Resp: 16   Temp: (!) 96 9 °F (36 1 °C)   TempSrc: Temporal   SpO2: 97%   Weight: 59 9 kg (132 lb)   Height: 5' 1" (1 549 m)     Body mass index is 24 94 kg/m²  Physical Exam  Constitutional:       General: She is not in acute distress  Appearance: She is well-developed  HENT:      Head: Normocephalic  Eyes:      General: No scleral icterus  Conjunctiva/sclera: Conjunctivae normal    Neck:      Vascular: No JVD  Cardiovascular:      Rate and Rhythm: Normal rate  Heart sounds: Normal heart sounds  Pulmonary:      Effort: Pulmonary effort is normal       Breath sounds: Normal breath sounds  No wheezing  Abdominal:      General: Bowel sounds are normal       Palpations: Abdomen is soft  Tenderness: There is no abdominal tenderness  Musculoskeletal:         General: No swelling  Normal range of motion  Cervical back: Normal range of motion and neck supple  Skin:     General: Skin is warm  Findings: No rash  Neurological:      General: No focal deficit present  Mental Status: She is alert and oriented to person, place, and time     Psychiatric:         Behavior: Behavior normal          LAB RESULTS:  Results for orders placed or performed during the hospital encounter of 12/01/21   Echo complete   Result Value Ref Range    TV S' 1 0 cm/s    AV area peak ondina 1 4 cm2    AV peak gradient 54 mmHg    Aortic valve mean velocity 15 80 m/s    TV peak gradient 24 mmHg    Tricuspid valve peak regurgitation velocity 2 46 m/s    LVPWd 0 90 cm    MV E' Tissue Velocity Septal 7 cm/s    IVSd 1 39 cm    LV DIASTOLIC VOLUME (MOD BIPLANE) 2D 40 mL    LEFT VENTRICLE SYSTOLIC VOLUME (MOD BIPLANE) 2D 14 mL    Left ventricular stroke volume (2D) 25 00 mL    AV Deceleration Time 2,443 ms    A4C EF 72 %    LVIDd 3 20 3 69 - 5 49 cm    LVIDS 2 10 2 1 - 4 0 cm    FS 34 28 - 44 %    Asc Ao 3 2 cm    Ao root 3 00 cm    RVID d 2 5 cm    LVOT mn grad 2 0 mmHg    AV area by cont VTI 1 5 cm2    AV regurgitation pressure 1/2 time 0 708 ms    AV mean gradient 12 mmHg    AV LVOT peak gradient 4 mmHg    E wave deceleration time 235 ms    LVOT diameter 2 0 cm    LVOT peak manny 1 04 m/s    LVOT peak VTI 22 92 cm    Ao VTI 46 82 cm    LVOT stroke volume 71 97 cm3    AV peak gradient 23 0 mmHg    MV Peak E Manny 94 cm/s    MV Peak A Manny 0 99 m/s    FABIOLA A4C 13 7 cm2    RAA A4C 9 6 cm2    MV stenosis pressure 1/2 time 0 ms    LVOT SI 47 10 ml/m2    LVOT area 3 14 cm2    AV valve area 1 54 cm2    ZLVIDS -3 41        ASSESSMENT and PLAN:      Stage 3b chronic kidney disease  Lab Results   Component Value Date    EGFR 31 11/03/2021    EGFR 35 04/20/2021    EGFR 34 03/30/2021    CREATININE 1 52 (H) 11/03/2021    CREATININE 1 40 (H) 04/20/2021    CREATININE 1 42 (H) 03/30/2021   Do not have any new lab test to assess kidney function  They will get blood test sometime this week  Advised to get regular blood test as kidney disease quite asymptomatic and progressive    Chronic kidney disease-mineral and bone disorder  Lab Results   Component Value Date    EGFR 31 11/03/2021    EGFR 35 04/20/2021    EGFR 34 03/30/2021    CREATININE 1 52 (H) 11/03/2021    CREATININE 1 40 (H) 04/20/2021    CREATININE 1 42 (H) 03/30/2021   Again as she does not a blood test will keep eye on the blood work when it come back    Labile hypertension  Very well control at this point and will continue to monitor      Everything discussed with the patient at length  I will see her back in 6 months  They will get blood work now and in 6 months        Portions of the record may have been created with voice recognition software  Occasional wrong word or "sound a like" substitutions may have occurred due to the inherent limitations of voice recognition software   Read the chart carefully and recognize, using context, where substitutions have occurred  If you have any questions, please contact the dictating provider

## 2022-06-09 ENCOUNTER — APPOINTMENT (OUTPATIENT)
Dept: LAB | Facility: HOSPITAL | Age: 82
End: 2022-06-09
Payer: COMMERCIAL

## 2022-06-09 DIAGNOSIS — N18.9 CHRONIC KIDNEY DISEASE-MINERAL AND BONE DISORDER: ICD-10-CM

## 2022-06-09 DIAGNOSIS — M89.9 CHRONIC KIDNEY DISEASE-MINERAL AND BONE DISORDER: ICD-10-CM

## 2022-06-09 DIAGNOSIS — N18.32 STAGE 3B CHRONIC KIDNEY DISEASE (HCC): ICD-10-CM

## 2022-06-09 DIAGNOSIS — E83.9 CHRONIC KIDNEY DISEASE-MINERAL AND BONE DISORDER: ICD-10-CM

## 2022-06-09 LAB
25(OH)D3 SERPL-MCNC: 57 NG/ML (ref 30–100)
ANION GAP SERPL CALCULATED.3IONS-SCNC: 10 MMOL/L (ref 4–13)
BACTERIA UR QL AUTO: ABNORMAL /HPF
BASOPHILS # BLD AUTO: 0.08 THOUSANDS/ΜL (ref 0–0.1)
BASOPHILS NFR BLD AUTO: 2 % (ref 0–1)
BILIRUB UR QL STRIP: NEGATIVE
BUN SERPL-MCNC: 20 MG/DL (ref 5–25)
CALCIUM SERPL-MCNC: 9.1 MG/DL (ref 8.3–10.1)
CHLORIDE SERPL-SCNC: 106 MMOL/L (ref 100–108)
CLARITY UR: ABNORMAL
CO2 SERPL-SCNC: 28 MMOL/L (ref 21–32)
COLOR UR: YELLOW
CREAT SERPL-MCNC: 1.5 MG/DL (ref 0.6–1.3)
CREAT UR-MCNC: 65.9 MG/DL
EOSINOPHIL # BLD AUTO: 0.12 THOUSAND/ΜL (ref 0–0.61)
EOSINOPHIL NFR BLD AUTO: 3 % (ref 0–6)
ERYTHROCYTE [DISTWIDTH] IN BLOOD BY AUTOMATED COUNT: 12.8 % (ref 11.6–15.1)
GFR SERPL CREATININE-BSD FRML MDRD: 31 ML/MIN/1.73SQ M
GLUCOSE P FAST SERPL-MCNC: 90 MG/DL (ref 65–99)
GLUCOSE UR STRIP-MCNC: NEGATIVE MG/DL
HCT VFR BLD AUTO: 41.2 % (ref 34.8–46.1)
HGB BLD-MCNC: 13.4 G/DL (ref 11.5–15.4)
HGB UR QL STRIP.AUTO: NEGATIVE
IMM GRANULOCYTES # BLD AUTO: 0.01 THOUSAND/UL (ref 0–0.2)
IMM GRANULOCYTES NFR BLD AUTO: 0 % (ref 0–2)
KETONES UR STRIP-MCNC: NEGATIVE MG/DL
LEUKOCYTE ESTERASE UR QL STRIP: ABNORMAL
LYMPHOCYTES # BLD AUTO: 1.5 THOUSANDS/ΜL (ref 0.6–4.47)
LYMPHOCYTES NFR BLD AUTO: 32 % (ref 14–44)
MCH RBC QN AUTO: 30.6 PG (ref 26.8–34.3)
MCHC RBC AUTO-ENTMCNC: 32.5 G/DL (ref 31.4–37.4)
MCV RBC AUTO: 94 FL (ref 82–98)
MONOCYTES # BLD AUTO: 0.39 THOUSAND/ΜL (ref 0.17–1.22)
MONOCYTES NFR BLD AUTO: 8 % (ref 4–12)
NEUTROPHILS # BLD AUTO: 2.65 THOUSANDS/ΜL (ref 1.85–7.62)
NEUTS SEG NFR BLD AUTO: 55 % (ref 43–75)
NITRITE UR QL STRIP: NEGATIVE
NON-SQ EPI CELLS URNS QL MICRO: ABNORMAL /HPF
NRBC BLD AUTO-RTO: 0 /100 WBCS
OTHER STN SPEC: ABNORMAL
PH UR STRIP.AUTO: 6.5 [PH]
PHOSPHATE SERPL-MCNC: 3.4 MG/DL (ref 2.3–4.1)
PLATELET # BLD AUTO: 318 THOUSANDS/UL (ref 149–390)
PMV BLD AUTO: 9.7 FL (ref 8.9–12.7)
POTASSIUM SERPL-SCNC: 3.9 MMOL/L (ref 3.5–5.3)
PROT UR STRIP-MCNC: NEGATIVE MG/DL
PROT UR-MCNC: 14 MG/DL
PROT/CREAT UR: 0.21 MG/G{CREAT} (ref 0–0.1)
PTH-INTACT SERPL-MCNC: 41.3 PG/ML (ref 18.4–80.1)
RBC # BLD AUTO: 4.38 MILLION/UL (ref 3.81–5.12)
RBC #/AREA URNS AUTO: ABNORMAL /HPF
SODIUM SERPL-SCNC: 144 MMOL/L (ref 136–145)
SP GR UR STRIP.AUTO: 1.01 (ref 1–1.03)
UROBILINOGEN UR QL STRIP.AUTO: 0.2 E.U./DL
WBC # BLD AUTO: 4.75 THOUSAND/UL (ref 4.31–10.16)
WBC #/AREA URNS AUTO: ABNORMAL /HPF

## 2022-06-09 PROCEDURE — 82306 VITAMIN D 25 HYDROXY: CPT

## 2022-06-09 PROCEDURE — 85025 COMPLETE CBC W/AUTO DIFF WBC: CPT

## 2022-06-09 PROCEDURE — 82570 ASSAY OF URINE CREATININE: CPT

## 2022-06-09 PROCEDURE — 80048 BASIC METABOLIC PNL TOTAL CA: CPT

## 2022-06-09 PROCEDURE — 36415 COLL VENOUS BLD VENIPUNCTURE: CPT

## 2022-06-09 PROCEDURE — 83970 ASSAY OF PARATHORMONE: CPT

## 2022-06-09 PROCEDURE — 81001 URINALYSIS AUTO W/SCOPE: CPT

## 2022-06-09 PROCEDURE — 84156 ASSAY OF PROTEIN URINE: CPT

## 2022-06-09 PROCEDURE — 84100 ASSAY OF PHOSPHORUS: CPT

## 2022-09-10 DIAGNOSIS — R63.4 WEIGHT LOSS: ICD-10-CM

## 2022-09-10 DIAGNOSIS — F34.1 DYSTHYMIA: ICD-10-CM

## 2022-09-12 RX ORDER — MIRTAZAPINE 15 MG/1
TABLET, FILM COATED ORAL
Qty: 90 TABLET | Refills: 1 | Status: SHIPPED | OUTPATIENT
Start: 2022-09-12

## 2022-09-16 ENCOUNTER — OFFICE VISIT (OUTPATIENT)
Dept: NEUROLOGY | Facility: CLINIC | Age: 82
End: 2022-09-16
Payer: COMMERCIAL

## 2022-09-16 VITALS
WEIGHT: 131 LBS | TEMPERATURE: 97 F | OXYGEN SATURATION: 96 % | HEIGHT: 61 IN | SYSTOLIC BLOOD PRESSURE: 126 MMHG | BODY MASS INDEX: 24.73 KG/M2 | DIASTOLIC BLOOD PRESSURE: 80 MMHG | HEART RATE: 74 BPM

## 2022-09-16 DIAGNOSIS — E23.6 PITUITARY MASS (HCC): ICD-10-CM

## 2022-09-16 DIAGNOSIS — F02.80 LATE ONSET ALZHEIMER'S DEMENTIA WITHOUT BEHAVIORAL DISTURBANCE (HCC): Primary | ICD-10-CM

## 2022-09-16 DIAGNOSIS — G30.1 LATE ONSET ALZHEIMER'S DEMENTIA WITHOUT BEHAVIORAL DISTURBANCE (HCC): Primary | ICD-10-CM

## 2022-09-16 PROCEDURE — 1160F RVW MEDS BY RX/DR IN RCRD: CPT | Performed by: PSYCHIATRY & NEUROLOGY

## 2022-09-16 PROCEDURE — 99214 OFFICE O/P EST MOD 30 MIN: CPT | Performed by: PSYCHIATRY & NEUROLOGY

## 2022-09-16 NOTE — PROGRESS NOTES
Malaika Dorman is a 78 y o  female  Chief Complaint   Patient presents with    Late onset Alzheimer's dementia without behavioral disturba       Assessment:  No diagnosis found  Plan:  Continue with Namenda 10 mg twice a day  Continue with mentally stimulating exercises  Follow-up in 6 months  Discussion:  Patient was advised to continue with Namenda 10 mg twice a day, continue with mentally stimulating exercises, he has not tolerated Aricept and Exelon in the past, I told her son that patient should be under constant supervision and he should contact the office of aging and make sure that he has help around the clock he is looking for her to be placed in [facility], [facility] she was advised mentally stimulating exercises to keep her blood pressure cholesterol and sugar under control, patient's son is not keen for her to have another imaging study  to go to the hospital if has any worsening symptoms and call me I have also advised her to use a walker and to see me back in 6 months and follow up with other physicians  Subjective:    HPI   Patient is here in follow-up for her dementia, since her last visit she is about the same or maybe slightly worse the son has noticed the patient has been having a slight decline she might have some occasional hallucination, she does have some supervising nursing staff during the morning but she lives by herself at night her son lives in [place] and does come mute frequently to see her and talks to her every day her sleep is good mood is good she has been eating good appetite is good no other complaints      Vitals:    09/16/22 1155   BP: 126/80   BP Location: Right arm   Patient Position: Sitting   Cuff Size: Adult   Pulse: 74   SpO2: 96%   Height: 5' 1" (1 549 m)       Current Medications    Current Outpatient Medications:     acetaminophen (TYLENOL) 325 mg tablet, Take 650 mg by mouth every 6 (six) hours as needed, Disp: , Rfl:     alendronate (FOSAMAX) 70 mg tablet, Take 1 tablet (70 mg total) by mouth every 7 days, Disp: 4 tablet, Rfl: 5    aspirin 81 MG tablet, Take 1 tablet (81 mg total) by mouth daily, Disp: , Rfl:     cholecalciferol (VITAMIN D3) 1,000 units tablet, Take 2,000 Units by mouth daily, Disp: , Rfl:     memantine (NAMENDA) 10 mg tablet, TAKE ONE TABLET BY MOUTH TWICE A DAY, Disp: 180 tablet, Rfl: 1    mirtazapine (REMERON) 15 mg tablet, TAKE ONE TABLET BY MOUTH EVERY DAY AT BEDTIME, Disp: 90 tablet, Rfl: 1    pantoprazole (PROTONIX) 40 mg tablet, Take 1 tablet (40 mg total) by mouth daily, Disp: 90 tablet, Rfl: 1    neomycin-polymyxin-hydrocortisone (CORTISPORIN) otic solution, Administer 4 drops into both ears every 6 (six) hours, Disp: 10 mL, Rfl: 0      Allergies  Cefazolin    Past Medical History  Past Medical History:   Diagnosis Date    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     Aortic aneurysm (HCC)     4 1 cm on December 6, 2018    Chronic kidney disease     CKD (chronic kidney disease) stage 2, GFR 60-89 ml/min     Colon polyps     Dementia (HCC)     Hyperlipidemia     Hypertension     Insomnia     Memory loss     Osteopenia with elevated frax score     Osteoporosis     Tubular adenoma of colon 04/2019    Uterine fibroid          Past Surgical History:  Past Surgical History:   Procedure Laterality Date    COLONOSCOPY  1004    date not certain     FOOT SURGERY      DC COLONOSCOPY FLX DX W/COLLJ SPEC WHEN PFRMD N/A 4/24/2019    Procedure: COLONOSCOPY;  Surgeon: Namita Villanueva MD;  [facility]; Service: Gastroenterology    DC ESOPHAGOGASTRODUODENOSCOPY TRANSORAL DIAGNOSTIC N/A 4/24/2019    Procedure: ESOPHAGOGASTRODUODENOSCOPY (EGD); Surgeon: Namita Villanueva MD;  [facility]; Service: Gastroenterology         Family History:  Family History   Problem Relation Age of Onset    Cancer Mother     Cancer Father        Social History:   reports that she has never smoked   She has never used smokeless tobacco  She reports current alcohol use of about 7 0 standard drinks of alcohol per week  She reports that she does not use drugs  I have reviewed the past medical history, surgical history, social and family history, current medications, allergies vitals, review of systems, and updated this information as appropriate today  Objective:    Physical Exam    Neurological Exam    GENERAL:  Cooperative in no acute distress  Well-developed and well-nourished    HEAD and NECK   Head is atraumatic normocephalic with no lesions or masses  Neck is supple with full range of motion    CARDIOVASCULAR  Carotid Arteries-no carotid bruits  NEUROLOGIC:  Mental Status-the patient is awake alert and oriented to simple commands, unable to tell me the month year  without aphasia or apraxia  Cranial Nerves: Visual fields are full to confrontation  D Extraocular movements are full without nystagmus  Pupils are 2-1/2 mm and reactive  Face is symmetrical to light touch  Movements of facial expression move symmetrically  Hearing is normal to finger rub bilaterally  Soft palate lifts symmetrically  Shoulder shrug is symmetrical  Tongue is midline without atrophy  Motor: No drift is noted on arm extension  Strength is full in the upper and lower extremities with normal bulk and tone  Gait is unremarkable            ROS:  Review of Systems   Constitutional: Negative  Negative for appetite change and fever  HENT: Negative  Negative for hearing loss, tinnitus, trouble swallowing and voice change  Eyes: Negative  Negative for photophobia and pain  Respiratory: Negative  Negative for shortness of breath  Cardiovascular: Negative  Negative for palpitations  Gastrointestinal: Negative  Negative for nausea and vomiting  Endocrine: Negative  Negative for cold intolerance  Genitourinary: Negative  Negative for dysuria, frequency and urgency  Musculoskeletal: Negative    Negative for myalgias and neck pain    Skin: Negative  Negative for rash  Neurological: Negative  Negative for dizziness, tremors, seizures, syncope, facial asymmetry, speech difficulty, weakness, light-headedness, numbness and headaches  Hematological: Bruises/bleeds easily  Psychiatric/Behavioral: Negative  Negative for confusion, hallucinations and sleep disturbance

## 2022-12-13 ENCOUNTER — TELEPHONE (OUTPATIENT)
Dept: NEPHROLOGY | Facility: CLINIC | Age: 82
End: 2022-12-13

## 2022-12-13 DIAGNOSIS — N18.32 STAGE 3B CHRONIC KIDNEY DISEASE (HCC): Primary | ICD-10-CM

## 2022-12-22 ENCOUNTER — TELEPHONE (OUTPATIENT)
Dept: NEPHROLOGY | Facility: CLINIC | Age: 82
End: 2022-12-22

## 2022-12-22 NOTE — TELEPHONE ENCOUNTER
2nd call LM for patient to call office for followup from Dec recall list with Jose Enrique Chicas  letter sent to patient

## 2023-03-25 ENCOUNTER — APPOINTMENT (EMERGENCY)
Dept: RADIOLOGY | Facility: HOSPITAL | Age: 83
End: 2023-03-25

## 2023-03-25 ENCOUNTER — APPOINTMENT (EMERGENCY)
Dept: CT IMAGING | Facility: HOSPITAL | Age: 83
End: 2023-03-25

## 2023-03-25 ENCOUNTER — HOSPITAL ENCOUNTER (EMERGENCY)
Facility: HOSPITAL | Age: 83
Discharge: HOME | End: 2023-03-26
Attending: EMERGENCY MEDICINE

## 2023-03-25 DIAGNOSIS — R55 SYNCOPE: Primary | ICD-10-CM

## 2023-03-25 DIAGNOSIS — R11.2 NAUSEA AND VOMITING: ICD-10-CM

## 2023-03-25 LAB
2HR DELTA HS TROPONIN: -1 NG/L
ALBUMIN SERPL BCP-MCNC: 4.3 G/DL (ref 3.5–5)
ALP SERPL-CCNC: 69 U/L (ref 34–104)
ALT SERPL W P-5'-P-CCNC: 12 U/L (ref 7–52)
ANION GAP SERPL CALCULATED.3IONS-SCNC: 9 MMOL/L (ref 4–13)
APTT PPP: 24 SECONDS (ref 23–37)
AST SERPL W P-5'-P-CCNC: 18 U/L (ref 13–39)
BASOPHILS # BLD AUTO: 0.08 THOUSANDS/ÂΜL (ref 0–0.1)
BASOPHILS NFR BLD AUTO: 1 % (ref 0–1)
BILIRUB SERPL-MCNC: 1.19 MG/DL (ref 0.2–1)
BUN SERPL-MCNC: 25 MG/DL (ref 5–25)
CALCIUM SERPL-MCNC: 9.9 MG/DL (ref 8.4–10.2)
CARDIAC TROPONIN I PNL SERPL HS: 6 NG/L
CARDIAC TROPONIN I PNL SERPL HS: 7 NG/L
CHLORIDE SERPL-SCNC: 104 MMOL/L (ref 96–108)
CO2 SERPL-SCNC: 28 MMOL/L (ref 21–32)
CREAT SERPL-MCNC: 1.53 MG/DL (ref 0.6–1.3)
EOSINOPHIL # BLD AUTO: 0.12 THOUSAND/ÂΜL (ref 0–0.61)
EOSINOPHIL NFR BLD AUTO: 1 % (ref 0–6)
ERYTHROCYTE [DISTWIDTH] IN BLOOD BY AUTOMATED COUNT: 12.5 % (ref 11.6–15.1)
GFR SERPL CREATININE-BSD FRML MDRD: 30 ML/MIN/1.73SQ M
GLUCOSE SERPL-MCNC: 158 MG/DL (ref 65–140)
HCT VFR BLD AUTO: 41.8 % (ref 34.8–46.1)
HGB BLD-MCNC: 13.9 G/DL (ref 11.5–15.4)
IMM GRANULOCYTES # BLD AUTO: 0.04 THOUSAND/UL (ref 0–0.2)
IMM GRANULOCYTES NFR BLD AUTO: 0 % (ref 0–2)
INR PPP: 1.01 (ref 0.84–1.19)
LYMPHOCYTES # BLD AUTO: 1.43 THOUSANDS/ÂΜL (ref 0.6–4.47)
LYMPHOCYTES NFR BLD AUTO: 15 % (ref 14–44)
MAGNESIUM SERPL-MCNC: 2.4 MG/DL (ref 1.9–2.7)
MCH RBC QN AUTO: 31.2 PG (ref 26.8–34.3)
MCHC RBC AUTO-ENTMCNC: 33.3 G/DL (ref 31.4–37.4)
MCV RBC AUTO: 94 FL (ref 82–98)
MONOCYTES # BLD AUTO: 0.69 THOUSAND/ÂΜL (ref 0.17–1.22)
MONOCYTES NFR BLD AUTO: 7 % (ref 4–12)
NEUTROPHILS # BLD AUTO: 7.34 THOUSANDS/ÂΜL (ref 1.85–7.62)
NEUTS SEG NFR BLD AUTO: 76 % (ref 43–75)
NRBC BLD AUTO-RTO: 0 /100 WBCS
PLATELET # BLD AUTO: 310 THOUSANDS/UL (ref 149–390)
PMV BLD AUTO: 9.5 FL (ref 8.9–12.7)
POTASSIUM SERPL-SCNC: 3.9 MMOL/L (ref 3.5–5.3)
PROT SERPL-MCNC: 7.3 G/DL (ref 6.4–8.4)
PROTHROMBIN TIME: 13.1 SECONDS (ref 11.6–14.5)
RBC # BLD AUTO: 4.45 MILLION/UL (ref 3.81–5.12)
SODIUM SERPL-SCNC: 141 MMOL/L (ref 135–147)
WBC # BLD AUTO: 9.7 THOUSAND/UL (ref 4.31–10.16)

## 2023-03-25 RX ADMIN — SODIUM CHLORIDE 1000 ML: 0.9 INJECTION, SOLUTION INTRAVENOUS at 20:17

## 2023-03-25 NOTE — ED PROVIDER NOTES
"Pt Name: New Rothman  MRN: [id number]  [place] 1940  Age/Sex: 78 y o  female  Date of evaluation: 3/25/2023  PCP: Aftab Garvin, [address] [place] [address]    Chief Complaint   Patient presents with   • Vomiting     Pt arrives via EMS from [facility] with a c/o vomiting while sitting on the toilet having a large bowel movement  As per staff, she had a \"moment of unresponsiveness\" while on the toilet  Pt has baseline dementia hx  HPI and MDM    78 y o  female presenting with syncopal episode  Son present at bedside, patient has dementia and unable to provide any history  Son states that patient was on the toilet at the nursing facility, she was trying to move her bowels when she had a syncopal episode  Per son, pt is DNR/DNI  Patient does not have any complaints currently  Spoke to [place] from [facility], had to go to the bathroom, had diarrhea, was vomiting, went to check on her, patient was found to be unresponsive  Per my independent interpretation of EKG, sinus bradycardia with heart rate 54, narrow QRS, slightly prolonged MS interval, QTc reassuring, no STEMI  Per my independent rotation of chest x-ray, no acute cardiopulmonary process  Blood work is reassuring, creatinine around baseline      Patient care signed out to Dr Porter Colby pending delta troponin and CT scan of the chest           Medications   sodium chloride 0 9 % bolus 1,000 mL (0 mL Intravenous Stopped 3/26/23 0105)         Past Medical and Surgical History    Past Medical History:   Diagnosis Date   • Anomaly of rib     diagnosed with \"sliders\" of ribs    • Aortic aneurysm (HCC)     4 1 cm on December 6, 2018   • Chronic kidney disease    • CKD (chronic kidney disease) stage 2, GFR 60-89 ml/min    • Colon polyps    • Dementia ([facility] [address] )    • Hyperlipidemia    • Hypertension    • Insomnia    • Memory loss    • Osteopenia with elevated frax score    • Osteoporosis    • Tubular adenoma of colon 04/2019   • " Uterine fibroid        Past Surgical History:   Procedure Laterality Date   • COLONOSCOPY  1591    date not certain    • FOOT SURGERY     • TX COLONOSCOPY FLX DX W/COLLJ SPEC WHEN PFRMD N/A 4/24/2019    Procedure: COLONOSCOPY;  Surgeon: Chaitanya Glez MD;  [facility]; Service: Gastroenterology   • TX ESOPHAGOGASTRODUODENOSCOPY TRANSORAL DIAGNOSTIC N/A 4/24/2019    Procedure: ESOPHAGOGASTRODUODENOSCOPY (EGD); Surgeon: Chaitanya Glez MD;  [facility]; Service: Gastroenterology       Family History   Problem Relation Age of Onset   • Cancer Mother    • Cancer Father        Social History     Tobacco Use   • Smoking status: Never   • Smokeless tobacco: Never   Vaping Use   • Vaping Use: Never used   Substance Use Topics   • Alcohol use: Yes     Alcohol/week: 7 0 standard drinks     Types: 7 Glasses of wine per week     Comment: socially    • Drug use: Never           Allergies    Allergies   Allergen Reactions   • Cefazolin      Other reaction(s): Unknown       Home Medications    Prior to Admission medications    Medication Sig Start Date End Date Taking?  Authorizing Provider   acetaminophen (TYLENOL) 325 mg tablet Take 650 mg by mouth every 6 (six) hours as needed    Historical Provider, MD   alendronate (FOSAMAX) 70 mg tablet Take 1 tablet (70 mg total) by mouth every 7 days 6/6/22 9/16/22  DASIA Martines   aspirin 81 MG tablet Take 1 tablet (81 mg total) by mouth daily 7/26/19   DASIA Davies   cholecalciferol (VITAMIN D3) 1,000 units tablet Take 2,000 Units by mouth daily    Historical Provider, MD   memantine (NAMENDA) 10 mg tablet TAKE ONE TABLET BY MOUTH TWICE A DAY 5/8/22   Cait Frank MD   mirtazapine (REMERON) 15 mg tablet TAKE ONE TABLET BY MOUTH EVERY DAY AT BEDTIME 9/12/22   DASIA Martines   neomycin-polymyxin-hydrocortisone (CORTISPORIN) otic solution Administer 4 drops into both ears every 6 (six) hours 4/18/22   DASIA Martines   pantoprazole (PROTONIX) 40 mg tablet Take 1 tablet (40 mg total) by mouth daily 4/18/22   Marcia Lux, CRNP           Physical Exam      ED Triage Vitals   Temperature Pulse Respirations Blood Pressure SpO2   03/25/23 1941 03/25/23 1929 03/25/23 1929 03/25/23 1929 03/25/23 1929   97 8 °F (36 6 °C) 57 18 121/60 96 %      Temp Source Heart Rate Source Patient Position - Orthostatic VS BP Location FiO2 (%)   03/25/23 1941 03/25/23 1929 03/25/23 1929 03/25/23 1929 --   Oral Monitor Lying Right arm       Pain Score       --                      Physical Exam  Constitutional:       General: She is not in acute distress  HENT:      Head: Normocephalic and atraumatic  Nose: Nose normal    Eyes:      Conjunctiva/sclera: Conjunctivae normal       Pupils: Pupils are equal, round, and reactive to light  Cardiovascular:      Rate and Rhythm: Normal rate and regular rhythm  Pulmonary:      Effort: Pulmonary effort is normal  No respiratory distress  Abdominal:      General: There is no distension  Palpations: Abdomen is soft  Tenderness: There is no abdominal tenderness  Musculoskeletal:         General: No deformity  Cervical back: Normal range of motion  Skin:     General: Skin is warm  Findings: No erythema  Neurological:      Mental Status: She is alert  Mental status is at baseline                Diagnostic Results      Labs:    Results Reviewed     Procedure Component Value Units Date/Time    HS Troponin I 2hr [[id number]]  (Normal) Collected: 03/25/23 2253    Lab Status: Final result Specimen: Blood from Arm, Right Updated: 03/25/23 2334     hs TnI 2hr 6 ng/L      Delta 2hr hsTnI -1 ng/L     HS Troponin 0hr (reflex protocol) [[id number]]  (Normal) Collected: 03/25/23 2016    Lab Status: Final result Specimen: Blood from Arm, Right Updated: 03/25/23 2116     hs TnI 0hr 7 ng/L     Comprehensive metabolic panel [[id number]]  (Abnormal) Collected: 03/25/23 2016    Lab Status: Final result Specimen: Blood from Arm, Right Updated: 03/25/23 2106     Sodium 141 mmol/L      Potassium 3 9 mmol/L      Chloride 104 mmol/L      CO2 28 mmol/L      ANION GAP 9 mmol/L      BUN 25 mg/dL      Creatinine 1 53 mg/dL      Glucose 158 mg/dL      Calcium 9 9 mg/dL      AST 18 U/L      ALT 12 U/L      Alkaline Phosphatase 69 U/L      Total Protein 7 3 g/dL      Albumin 4 3 g/dL      Total Bilirubin 1 19 mg/dL      eGFR 30 ml/min/1 73sq m     Narrative:      [place] guidelines for Chronic Kidney Disease (CKD):   •  Stage 1 with normal or high GFR (GFR > 90 mL/min/1 73 square meters)  •  Stage 2 Mild CKD (GFR = 60-89 mL/min/1 73 square meters)  •  Stage 3A Moderate CKD (GFR = 45-59 mL/min/1 73 square meters)  •  Stage 3B Moderate CKD (GFR = 30-44 mL/min/1 73 square meters)  •  Stage 4 Severe CKD (GFR = 15-29 mL/min/1 73 square meters)  •  Stage 5 End Stage CKD (GFR <15 mL/min/1 73 square meters)  Note: GFR calculation is accurate only with a steady state creatinine    Magnesium [[id number]]  (Normal) Collected: 03/25/23 2016    Lab Status: Final result Specimen: Blood from Arm, Right Updated: 03/25/23 2106     Magnesium 2 4 mg/dL     Protime-INR [[id number]]  (Normal) Collected: 03/25/23 2016    Lab Status: Final result Specimen: Blood from Arm, Right Updated: 03/25/23 2055     Protime 13 1 seconds      INR 1 01    APTT [[id number]]  (Normal) Collected: 03/25/23 2016    Lab Status: Final result Specimen: Blood from Arm, Right Updated: 03/25/23 2055     PTT 24 seconds     CBC and differential [[id number]]  (Abnormal) Collected: 03/25/23 2016    Lab Status: Final result Specimen: Blood from Arm, Right Updated: 03/25/23 2029     WBC 9 70 Thousand/uL      RBC 4 45 Million/uL      Hemoglobin 13 9 g/dL      Hematocrit 41 8 %      MCV 94 fL      MCH 31 2 pg      MCHC 33 3 g/dL      RDW 12 5 %      MPV 9 5 fL      Platelets 419 Thousands/uL      nRBC 0 /100 WBCs      Neutrophils Relative 76 %      Immat GRANS % 0 % Lymphocytes Relative 15 %      Monocytes Relative 7 %      Eosinophils Relative 1 %      Basophils Relative 1 %      Neutrophils Absolute 7 34 Thousands/µL      Immature Grans Absolute 0 04 Thousand/uL      Lymphocytes Absolute 1 43 Thousands/µL      Monocytes Absolute 0 69 Thousand/µL      Eosinophils Absolute 0 12 Thousand/µL      Basophils Absolute 0 08 Thousands/µL           All labs reviewed and utilized in the medical decision making process    Radiology:    CT chest without contrast   Final Result      1  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   2  No acute pulmonary abnormality               Workstation performed: [id number]         XR chest 1 view portable   Final Result      No acute cardiopulmonary disease  Workstation performed: [id number]             All radiology studies independently viewed by me and interpreted by the radiologist     Procedure    Procedures        FINAL IMPRESSION    Final diagnoses:   Nausea and vomiting   Syncope         DISPOSITION    Time reflects when diagnosis was documented in both MDM as applicable and the Disposition within this note     Time User Action Codes Description Comment    3/26/2023  1:06 AM Rupali Hunting T Add [R11 2] Nausea and vomiting     3/26/2023  1:06 AM Alexia Kleine Add [R55] Syncope     3/26/2023  1:06 AM Alexia Kleine Modify [R11 2] Nausea and vomiting     3/26/2023  1:06 AM Alexia Kleine Modify [R55] Syncope       ED Disposition     ED Disposition   Discharge    Condition   Stable    Date/Time   Sun Mar 26, 2023  1:06 AM    Comment   Janet Estrada discharge to home/self care                 Follow-up Information     Follow up With Specialties Details Why Contact Info Additional [address] [facility] Emergency Department Emergency Medicine Go to  If symptoms worsen [address] [facility] [id number] [facility] Emergency Department, 100 [facility] [address] [place], [place], [address] Family Medicine Call in 1 day To schedule close outpatient follow-up for this visit 4[address] [facility] Cardiology Call in 1 day Schedule close outpatient follow-up for further cardiac risk stratification 1000 [facility] 1 1950 [facility], [facility], [address] [place], [place], [address] TO:    [facility]  [address] [facility] [address] [phone]  Go to   If symptoms worsen    [facility] [id number]    Call in 1 day  To schedule close outpatient follow-up for this visit    [facility] [address] [address]  [phone]  Call in 1 day  Schedule close outpatient follow-up for further cardiac risk stratification      DISCHARGE MEDICATIONS:    Discharge Medication List as of 3/26/2023  1:20 AM      CONTINUE these medications which have NOT CHANGED    Details   acetaminophen (TYLENOL) 325 mg tablet Take 650 mg by mouth every 6 (six) hours as needed, Historical Med      alendronate (FOSAMAX) 70 mg tablet Take 1 tablet (70 mg total) by mouth every 7 days, Starting Mon 6/6/2022, Until Fri 9/16/2022, Normal      aspirin 81 MG tablet Take 1 tablet (81 mg total) by mouth daily, Starting Fri 7/26/2019, No Print      cholecalciferol (VITAMIN D3) 1,000 units tablet Take 2,000 Units by mouth daily, Historical Med      memantine (NAMENDA) 10 mg tablet TAKE ONE TABLET BY MOUTH TWICE A DAY, Normal      mirtazapine (REMERON) 15 mg tablet TAKE ONE TABLET BY MOUTH EVERY DAY AT BEDTIME, Normal      neomycin-polymyxin-hydrocortisone (CORTISPORIN) otic solution Administer 4 drops into both ears every 6 (six) hours, Starting Mon 4/18/2022, Normal      pantoprazole (PROTONIX) 40 mg tablet Take 1 tablet (40 mg total) by mouth daily, Starting Mon 4/18/2022, Normal                      Saurabh Mckenna DO        This note was partially completed using voice recognition technology, and was scanned for gross errors; however some errors may still exist  Please contact the author with any questions or requests for clarification        Eldon Hartley DO  03/31/23 9094

## 2023-03-26 VITALS
RESPIRATION RATE: 22 BRPM | TEMPERATURE: 97.8 F | HEART RATE: 70 BPM | SYSTOLIC BLOOD PRESSURE: 118 MMHG | DIASTOLIC BLOOD PRESSURE: 63 MMHG | OXYGEN SATURATION: 95 %

## 2023-03-26 LAB
ATRIAL RATE: 54 BPM
P AXIS: 14 DEGREES
PR INTERVAL: 206 MS
QRS AXIS: -28 DEGREES
QRSD INTERVAL: 78 MS
QT INTERVAL: 466 MS
QTC INTERVAL: 441 MS
T WAVE AXIS: 31 DEGREES
VENTRICULAR RATE: 54 BPM

## 2023-03-26 NOTE — ED NOTES
Spoke with [place] from [place] with an update  She would like to be called back when pt is ready to be discharged so that they can work on transportation        Jewell Dasilva RN  03/25/23 9897

## 2023-03-26 NOTE — ED CARE HANDOFF
Emergency Department Sign Out Note        Sign out and transfer of care from Dr Chris Griggs  See Separate Emergency Department note  The patient, Rosanna Salgado, was evaluated by the previous provider for syncopal event  Workup Completed: Workup partially completed, initial trop 7, delta trop and CT chest pending, DNR/DNI code status confirmed by previous provider  Disposition per test results  ED Course / Workup Pending (followup): Troponin resulted reassuring with 2-hour delta Trope of 6, CT chest showed stable aortic ectasia  Discussed findings with patient and son, discussed differential diagnosis of syncopal event, noted possible vasovagal syncope versus malignant arrhythmia or other more serious life-threatening cause, offered admission but after discussion of risk and benefits patient and son declined  Plan formed for return to [facility]  Procedures  MDM        Disposition  Final diagnoses:   None     ED Disposition     None      Follow-up Information    None       Patient's Medications   Discharge Prescriptions    No medications on file     No discharge procedures on file         ED Provider  Electronically Signed by     Debbie Gayle MD  03/26/23 0871

## 2023-06-05 ENCOUNTER — TELEPHONE (OUTPATIENT)
Dept: FAMILY MEDICINE CLINIC | Facility: CLINIC | Age: 83
End: 2023-06-05

## 2023-06-06 ENCOUNTER — TELEPHONE (OUTPATIENT)
Dept: FAMILY MEDICINE CLINIC | Facility: CLINIC | Age: 83
End: 2023-06-06

## 2023-06-06 NOTE — TELEPHONE ENCOUNTER
----- Message from Saima Mccauley sent at 6/5/2023  4:22 PM EDT -----  Spoke with patient's son- he stated that patient now resides at [facility] and they handle all of her medical needs  As per her son, she does not need to schedule the AWV with Mary  Thank you!

## 2023-06-07 NOTE — TELEPHONE ENCOUNTER
06/07/23 6:35 PM        The office's request has been received, reviewed, and the patient chart updated  The PCP has successfully been removed with a patient attribution note  This message will now be completed          Thank you  Yoseph Lozada

## 2023-08-30 NOTE — PATIENT INSTRUCTIONS
Discussed all with patient and her stepdaughter  Will get repeat DEXA scan  Patient was advised to make her appointment with Dr Esther Jolly for evaluation of possible glaucoma  Let us know if your pharmacy is able to give you showing recs  Do the stool specimen  Obesity   AMBULATORY CARE:   Obesity  is when your body mass index (BMI) is greater than 30  Your healthcare provider will use your height and weight to measure your BMI  The risks of obesity include  many health problems, such as injuries or physical disability  You may need tests to check for the following:  · Diabetes     · High blood pressure or high cholesterol     · Heart disease     · Gallbladder or liver disease     · Cancer of the colon, breast, prostate, liver, or kidney     · Sleep apnea     · Arthritis or gout  Seek care immediately if:   · You have a severe headache, confusion, or difficulty speaking  · You have weakness on one side of your body  · You have chest pain, sweating, or shortness of breath  Contact your healthcare provider if:   · You have symptoms of gallbladder or liver disease, such as pain in your upper abdomen  · You have knee or hip pain and discomfort while walking  · You have symptoms of diabetes, such as intense hunger and thirst, and frequent urination  · You have symptoms of sleep apnea, such as snoring or daytime sleepiness  · You have questions or concerns about your condition or care  Treatment for obesity  focuses on helping you lose weight to improve your health  Even a small decrease in BMI can reduce the risk for many health problems  Your healthcare provider will help you set a weight-loss goal   · Lifestyle changes  are the first step in treating obesity  These include making healthy food choices and getting regular physical activity  Your healthcare provider may suggest a weight-loss program that involves coaching, education, and therapy       · Medicine  may help you lose weight when it is used with a healthy diet and physical activity  · Surgery  can help you lose weight if you are very obese and have other health problems  There are several types of weight-loss surgery  Ask your healthcare provider for more information  Be successful losing weight:   · Set small, realistic goals  An example of a small goal is to walk for 20 minutes 5 days a week  Anther goal is to lose 5% of your body weight  · Tell friends, family members, and coworkers about your goals  and ask for their support  Ask a friend to lose weight with you, or join a weight-loss support group  · Identify foods or triggers that may cause you to overeat , and find ways to avoid them  Remove tempting high-calorie foods from your home and workplace  Place a bowl of fresh fruit on your kitchen counter  If stress causes you to eat, then find other ways to cope with stress  · Keep a diary to track what you eat and drink  Also write down how many minutes of physical activity you do each day  Weigh yourself once a week and record it in your diary  Eating changes: You will need to eat 500 to 1,000 fewer calories each day than you currently eat to lose 1 to 2 pounds a week  The following changes will help you cut calories:  · Eat smaller portions  Use small plates, no larger than 9 inches in diameter  Fill your plate half full of fruits and vegetables  Measure your food using measuring cups until you know what a serving size looks like  · Eat 3 meals and 1 or 2 snacks each day  Plan your meals in advance  Jeff Betts and eat at home most of the time  Eat slowly  · Eat fruits and vegetables at every meal   They are low in calories and high in fiber, which makes you feel full  Do not add butter, margarine, or cream sauce to vegetables  Use herbs to season steamed vegetables  · Eat less fat and fewer fried foods  Eat more baked or grilled chicken and fish   These protein sources are lower in calories and fat than red meat  Limit fast food  Dress your salads with olive oil and vinegar instead of bottled dressing  · Limit the amount of sugar you eat  Do not drink sugary beverages  Limit alcohol  Activity changes:  Physical activity is good for your body in many ways  It helps you burn calories and build strong muscles  It decreases stress and depression, and improves your mood  It can also help you sleep better  Talk to your healthcare provider before you begin an exercise program   · Exercise for at least 30 minutes 5 days a week  Start slowly  Set aside time each day for physical activity that you enjoy and that is convenient for you  It is best to do both weight training and an activity that increases your heart rate, such as walking, bicycling, or swimming  · Find ways to be more active  Do yard work and housecleaning  Walk up the stairs instead of using elevators  Spend your leisure time going to events that require walking, such as outdoor festivals or fairs  This extra physical activity can help you lose weight and keep it off  Follow up with your healthcare provider as directed: You may need to meet with a dietitian  Write down your questions so you remember to ask them during your visits  © 2017 [address] [employer]  Information is for End User's use only and may not be sold, redistributed or otherwise used for commercial purposes  All illustrations and images included in CareNotes® are the copyrighted property of [email or web address]  or [employer]  The above information is an  only  It is not intended as medical advice for individual conditions or treatments  Talk to your doctor, nurse or pharmacist before following any medical regimen to see if it is safe and effective for you  Urinary Incontinence   WHAT YOU NEED TO KNOW:   What is urinary incontinence? Urinary incontinence (UI) is when you lose control of your bladder  What causes UI?   UI occurs because your bladder cannot store or empty urine properly  The following are the most common types of UI:  · Stress incontinence  is when you leak urine due to increased bladder pressure  This may happen when you cough, sneeze, or exercise  · Urge incontinence  is when you feel the need to urinate right away and leak urine accidentally  · Mixed incontinence  is when you have both stress and urge UI  What are the signs and symptoms of UI?   · You feel like your bladder does not empty completely when you urinate  · You urinate often and need to urinate immediately  · You leak urine when you sleep, or you wake up with the urge to urinate  · You leak urine when you cough, sneeze, exercise, or laugh  How is UI diagnosed? Your healthcare provider will ask how often you leak urine and whether you have stress or urge symptoms  Tell him which medicines you take, how often you urinate, and how much liquid you drink each day  You may need any of the following tests:  · Urine tests  may show infection or kidney function  · A pelvic exam  may be done to check for blockages  A pelvic exam will also show if your bladder, uterus, or other organs have moved out of place  · An x-ray, ultrasound, or CT  may show problems with parts of your urinary system  You may be given contrast liquid to help your organs show up better in the pictures  Tell the healthcare provider if you have ever had an allergic reaction to contrast liquid  Do not enter the MRI room with anything metal  Metal can cause serious injury  Tell the healthcare provider if you have any metal in or on your body  · A bladder scan  will show how much urine is left in your bladder after you urinate  You will be asked to urinate and then healthcare providers will use a small ultrasound machine to check the urine left in your bladder  · Cystometry  is used to check the function of your urinary system   Your healthcare provider checks the pressure in your bladder while filling it with fluid  Your bladder pressure may also be tested when your bladder is full and while you urinate  How is UI treated? · Medicines  can help strengthen your bladder control  · Electrical stimulation  is used to send a small amount of electrical energy to your pelvic floor muscles  This helps control your bladder function  Electrodes may be placed outside your body or in your rectum  For women, the electrodes may be placed in the vagina  · A bulking agent  may be injected into the wall of your urethra to make it thicker  This helps keep your urethra closed and decreases urine leakage  · Devices  such as a clamp, pessary, or tampon may help stop urine leaks  Ask your healthcare provider for more information about these and other devices  · Surgery  may be needed if other treatments do not work  Several types of surgery can help improve your bladder control  Ask your healthcare provider for more information about the surgery you may need  How can I manage my symptoms? · Do pelvic muscle exercises often  Your pelvic muscles help you stop urinating  Squeeze these muscles tight for 5 seconds, then relax for 5 seconds  Gradually work up to squeezing for 10 seconds  Do 3 sets of 15 repetitions a day, or as directed  This will help strengthen your pelvic muscles and improve bladder control  · A catheter  may be used to help empty your bladder  A catheter is a tiny, plastic tube that is put into your bladder to drain your urine  Your healthcare provider may tell you to use a catheter to prevent your bladder from getting too full and leaking urine  · Keep a UI record  Write down how often you leak urine and how much you leak  Make a note of what you were doing when you leaked urine  · Train your bladder  Go to the bathroom at set times, such as every 2 hours, even if you do not feel the urge to go  You can also try to hold your urine when you feel the urge to go   For example, hold your urine for 5 minutes when you feel the urge to go  As that becomes easier, hold your urine for 10 minutes  · Drink liquids as directed  Ask your healthcare provider how much liquid to drink each day and which liquids are best for you  You may need to limit the amount of liquid you drink to help control your urine leakage  Limit or do not have drinks that contain caffeine or alcohol  Do not drink any liquid right before you go to bed  · Prevent constipation  Eat a variety of high-fiber foods  Good examples are high-fiber cereals, beans, vegetables, and whole-grain breads  Prune juice may help make your bowel movement softer  Walking is the best way to trigger your intestines to have a bowel movement  · Exercise regularly and maintain a healthy weight  Ask your healthcare provider how much you should weigh and about the best exercise plan for you  Weight loss and exercise will decrease pressure on your bladder and help you control your leakage  Ask him to help you create a weight loss plan if you are overweight  When should I seek immediate care? · You have severe pain  · You are confused or cannot think clearly  When should I contact my healthcare provider? · You have a fever  · You see blood in your urine  · You have pain when you urinate  · You have new or worse pain, even after treatment  · Your mouth feels dry or you have vision changes  · Your urine is cloudy or smells bad  · You have questions or concerns about your condition or care  CARE AGREEMENT:   You have the right to help plan your care  Learn about your health condition and how it may be treated  Discuss treatment options with your caregivers to decide what care you want to receive  You always have the right to refuse treatment  The above information is an  only  It is not intended as medical advice for individual conditions or treatments   Talk to your doctor, nurse or pharmacist before following any medical regimen to see if it is safe and effective for you  © 2017 [address] [employer] Information is for End User's use only and may not be sold, redistributed or otherwise used for commercial purposes  All illustrations and images included in CareNotes® are the copyrighted property of A D A M , Inc  or [employer]  Cigarette Smoking and Your Health   AMBULATORY CARE:   Risks to your health if you smoke:  Nicotine and other chemicals found in tobacco damage every cell in your body  Even if you are a light smoker, you have an increased risk for cancer, heart disease, and lung disease  If you are pregnant or have diabetes, smoking increases your risk for complications  Benefits to your health if you stop smoking:   · You decrease respiratory symptoms such as coughing, wheezing, and shortness of breath  · You reduce your risk for cancers of the lung, mouth, throat, kidney, bladder, pancreas, stomach, and cervix  If you already have cancer, you increase the benefits of chemotherapy  You also reduce your risk for cancer returning or a second cancer from developing  · You reduce your risk for heart disease, blood clots, heart attack, and stroke  · You reduce your risk for lung infections, and diseases such as pneumonia, asthma, chronic bronchitis, and emphysema  · Your circulation improves  More oxygen can be delivered to your body  If you have diabetes, you lower your risk for complications, such as kidney, artery, and eye diseases  You also lower your risk for nerve damage  Nerve damage can lead to amputations, poor vision, and blindness  · You improve your body's ability to heal and to fight infections  Benefits to the health of others if you stop smoking:  Tobacco is harmful to nonsmokers who breathe in your secondhand smoke   The following are ways the health of others around you may improve when you stop smoking:  · You lower the risks for lung cancer and heart disease in nonsmoking adults  · If you are pregnant, you lower the risk for miscarriage, early delivery, low birth weight, and stillbirth  You also lower your baby's risk for SIDS, obesity, developmental delay, and neurobehavioral problems, such as ADHD  · If you have children, you lower their risk for ear infections, colds, pneumonia, bronchitis, and asthma  For more information and support to stop smoking:   · [employer]  Phone: 9- 304 - 938-5060  Web Address: www [employer]  Follow up with your healthcare provider as directed:  Write down your questions so you remember to ask them during your visits  © 2017 [address] Information is for End User's use only and may not be sold, redistributed or otherwise used for commercial purposes  All illustrations and images included in CareNotes® are the copyrighted property of A D A M , Inc  or [employer]  The above information is an  only  It is not intended as medical advice for individual conditions or treatments  Talk to your doctor, nurse or pharmacist before following any medical regimen to see if it is safe and effective for you  Fall Prevention   AMBULATORY CARE:   Fall prevention  includes ways to make your home and other areas safer  It also includes ways you can move more carefully to prevent a fall  Health conditions that cause changes in your blood pressure, vision, or muscle strength and coordination may increase your risk for falls  Medicines may also increase your risk for falls if they make you dizzy, weak, or sleepy  Call 911 or have someone else call if:   · You have fallen and are unconscious  · You have fallen and cannot move part of your body  Contact your healthcare provider if:   · You have fallen and have pain or a headache  · You have questions or concerns about your condition or care  Fall prevention tips:   · Stand or sit up slowly    This may help you keep your balance and prevent falls  · Use assistive devices as directed  Your healthcare provider may suggest that you use a cane or walker to help you keep your balance  You may need to have grab bars put in your bathroom near the toilet or in the shower  · Wear shoes that fit well and have soles that   Wear shoes both inside and outside  Use slippers with good   Do not wear shoes with high heels  · Wear a personal alarm  This is a device that allows you to call 911 if you fall and need help  Ask your healthcare provider for more information  · Stay active  Exercise can help strengthen your muscles and improve your balance  Your healthcare provider may recommend water aerobics or walking  He or she may also recommend physical therapy to improve your coordination  Never start an exercise program without talking to your healthcare provider first      · Manage your medical conditions  Keep all appointments with your healthcare providers  Visit your eye doctor as directed  Home safety tips:   · Add items to prevent falls in the bathroom  Put nonslip strips on your bath or shower floor to prevent you from slipping  Use a bath mat if you do not have carpet in the bathroom  This will prevent you from falling when you step out of the bath or shower  Use a shower seat so you do not need to stand while you shower  Sit on the toilet or a chair in your bathroom to dry yourself and put on clothing  This will prevent you from losing your balance from drying or dressing yourself while you are standing  · Keep paths clear  Remove books, shoes, and other objects from walkways and stairs  Place cords for telephones and lamps out of the way so that you do not need to walk over them  Tape them down if you cannot move them  Remove small rugs  If you cannot remove a rug, secure it with double-sided tape  This will prevent you from tripping  · Install bright lights in your home    Use night lights to help light paths to the bathroom or kitchen  Always turn on the light before you start walking  · Keep items you use often on shelves within reach  Do not use a step stool to help you reach an item  · Paint or place reflective tape on the edges of your stairs  This will help you see the stairs better  Follow up with your healthcare provider as directed:  Write down your questions so you remember to ask them during your visits  © 2017 [address] [facility] Information is for End User's use only and may not be sold, redistributed or otherwise used for commercial purposes  All illustrations and images included in CareNotes® are the copyrighted property of A D A M , Inc  or [employer]  The above information is an  only  It is not intended as medical advice for individual conditions or treatments  Talk to your doctor, nurse or pharmacist before following any medical regimen to see if it is safe and effective for you  Advance Directives   WHAT YOU NEED TO KNOW:   What are advance directives? Advance directives are legal documents that state your wishes and plans for medical care  These plans are made ahead of time in case you lose your ability to make decisions for yourself  Advance directives can apply to any medical decision, such as the treatments you want, and if you want to donate organs  What are the types of advance directives? There are many types of advance directives, and each state has rules about how to use them  You may choose a combination of any of the following:  · Living will: This is a written record of the treatment you want  You can also choose which treatments you do not want, which to limit, and which to stop at a certain time  This includes surgery, medicine, IV fluid, and tube feedings  · Durable power of  for healthcare [place]):   This is a written record that states who you want to make healthcare choices for you when you are unable to make them for yourself  This person, called a proxy, is usually a family member or a friend  You may choose more than 1 proxy  · Do not resuscitate (DNR) order:  A DNR order is used in case your heart stops beating or you stop breathing  It is a request not to have certain forms of treatment, such as CPR  A DNR order may be included in other types of advance directives  · Medical directive: This covers the care that you want if you are in a coma, near death, or unable to make decisions for yourself  You can list the treatments you want for each condition  Treatment may include pain medicine, surgery, blood transfusions, dialysis, IV or tube feedings, and a ventilator (breathing machine)  · Values history: This document has questions about your views, beliefs, and how you feel and think about life  This information can help others choose the care that you would choose  Why are advance directives important? An advance directive helps you control your care  Although spoken wishes may be used, it is better to have your wishes written down  Spoken wishes can be misunderstood, or not followed  Treatments may be given even if you do not want them  An advance directive may make it easier for your family to make difficult choices about your care  How do I decide what to put in my advance directives? · Make informed decisions:  Make sure you fully understand treatments or care you may receive  Think about the benefits and problems your decisions could cause for you or your family  Talk to healthcare providers if you have concerns or questions before you write down your wishes  You may also want to talk with your Confucianist or , or a   Check your state laws to make sure that what you put in your advance directive is legal      · Sign all forms:  Sign and date your advance directive when you have finished  You may also need 2 witnesses to sign the forms   Witnesses cannot be your doctor or his staff, your spouse, heirs or beneficiaries, people you owe money to, or your chosen proxy  Talk to your family, proxy, and healthcare providers about your advance directive  Give each person a copy, and keep one for yourself in a place you can get to easily  Do not keep it hidden or locked away  · Review and revise your plans: You can revise your advance directive at any time, as long as you are able to make decisions  Review your plan every year, and when there are changes in your life, or your health  When you make changes, let your family, proxy, and healthcare providers know  Give each a new copy  Where can I find more information? · [employer]  [address] , [address]  Phone: 3- [phone] - [phone]  Phone: 5- 800 - [phone]  Web Address: http://www  aa org  · [address] [facility])  [address] [facility], [address] [facility] , [address] [place] [address]  Phone: [phone]  Phone: [phone]  Web Address: Joan dunn  CARE AGREEMENT:   You have the right to help plan your care  To help with this plan, you must learn about your health condition and treatment options  You must also learn about advance directives and how they are used  Work with your healthcare providers to decide what care will be used to treat you  You always have the right to refuse treatment  The above information is an  only  It is not intended as medical advice for individual conditions or treatments  Talk to your doctor, nurse or pharmacist before following any medical regimen to see if it is safe and effective for you  © 2017 [address] [employer]  Information is for End User's use only and may not be sold, redistributed or otherwise used for commercial purposes  All illustrations and images included in CareNotes® are the copyrighted property of A D A M , Inc  or [employer]  Klisyri Counseling:  I discussed with the patient the risks of Klisyri including but not limited to erythema, scaling, itching, weeping, crusting, and pain.

## 2024-03-05 ENCOUNTER — TELEPHONE (OUTPATIENT)
Age: 84
End: 2024-03-05

## 2024-03-13 RX ORDER — LORAZEPAM 0.5 MG/1
TABLET ORAL
Qty: 90 TABLET | Refills: 0 | OUTPATIENT
Start: 2024-03-13

## 2024-10-06 ENCOUNTER — APPOINTMENT (EMERGENCY)
Dept: CT IMAGING | Facility: HOSPITAL | Age: 84
End: 2024-10-06
Payer: COMMERCIAL

## 2024-10-06 ENCOUNTER — APPOINTMENT (EMERGENCY)
Dept: RADIOLOGY | Facility: HOSPITAL | Age: 84
End: 2024-10-06
Payer: COMMERCIAL

## 2024-10-06 ENCOUNTER — HOSPITAL ENCOUNTER (EMERGENCY)
Facility: HOSPITAL | Age: 84
Discharge: HOME | End: 2024-10-06
Attending: EMERGENCY MEDICINE
Payer: COMMERCIAL

## 2024-10-06 VITALS
OXYGEN SATURATION: 96 % | RESPIRATION RATE: 20 BRPM | DIASTOLIC BLOOD PRESSURE: 64 MMHG | SYSTOLIC BLOOD PRESSURE: 126 MMHG | HEART RATE: 74 BPM | TEMPERATURE: 98.7 F

## 2024-10-06 DIAGNOSIS — S70.00XA CONTUSION OF HIP: ICD-10-CM
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
## 2024-10-06 LAB
ATRIAL RATE: 73 BPM
ATRIAL RATE: 73 BPM
P AXIS: 70 DEGREES
P AXIS: 86 DEGREES
PR INTERVAL: 178 MS
PR INTERVAL: 180 MS
QRS AXIS: -35 DEGREES
QRS AXIS: -43 DEGREES
QRSD INTERVAL: 80 MS
QRSD INTERVAL: 80 MS
QT INTERVAL: 404 MS
QT INTERVAL: 416 MS
QTC INTERVAL: 445 MS
QTC INTERVAL: 458 MS
T WAVE AXIS: 54 DEGREES
T WAVE AXIS: 67 DEGREES
VENTRICULAR RATE: 73 BPM
VENTRICULAR RATE: 73 BPM

## 2024-10-06 PROCEDURE — 93005 ELECTROCARDIOGRAM TRACING: CPT

## 2024-10-06 PROCEDURE — 93010 ELECTROCARDIOGRAM REPORT: CPT | Performed by: INTERNAL MEDICINE

## 2024-10-06 PROCEDURE — 99284 EMERGENCY DEPT VISIT MOD MDM: CPT

## 2024-10-06 PROCEDURE — 99284 EMERGENCY DEPT VISIT MOD MDM: CPT | Performed by: EMERGENCY MEDICINE

## 2024-10-06 PROCEDURE — 72170 X-RAY EXAM OF PELVIS: CPT

## 2024-10-06 PROCEDURE — 70450 CT HEAD/BRAIN W/O DYE: CPT

## 2024-10-06 NOTE — ED PROVIDER NOTES
"Final diagnoses:   Fall, initial encounter   Contusion of hip     ED Disposition       ED Disposition   Discharge    Condition   Stable    Date/Time   Sun Oct 6, 2024  4:43 PM    Comment   Yamila Estrada discharge to home/self care.                   Assessment & Plan       Medical Decision Making  Neg hip/pelvis xray  Neg CT head  Clinically well and stable     Problems Addressed:  Contusion of hip: acute illness or injury  Fall, initial encounter: acute illness or injury    Amount and/or Complexity of Data Reviewed  Radiology: ordered and independent interpretation performed.             Medications - No data to display    ED Risk Strat Scores                           SBIRT 22yo+      Flowsheet Row Most Recent Value   Initial Alcohol Screen: US AUDIT-C     1. How often do you have a drink containing alcohol? 0 Filed at: 10/06/2024 1556   2. How many drinks containing alcohol do you have on a typical day you are drinking?  0 Filed at: 10/06/2024 1556   3b. FEMALE Any Age, or MALE 65+: How often do you have 4 or more drinks on one occassion? 0 Filed at: 10/06/2024 1556   Audit-C Score 0 Filed at: 10/06/2024 1556   WALLACE: How many times in the past year have you...    Used an illegal drug or used a prescription medication for non-medical reasons? 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     Aortic aneurysm (HCC)     4.1 cm on December 6, 2018    Chronic kidney disease     CKD (chronic kidney disease) stage 2, GFR 60-89 ml/min     Colon polyps     Dementia (HCC)     Hyperlipidemia     Hypertension     Insomnia     Memory loss     Osteopenia with " "elevated frax score     Osteoporosis     Tubular adenoma of colon 04/2019    Uterine fibroid       Past Surgical History:   Procedure Laterality Date    COLONOSCOPY  2016    date not certain     FOOT SURGERY      IA COLONOSCOPY FLX DX W/COLLJ SPEC WHEN PFRMD N/A 4/24/2019    Procedure: COLONOSCOPY;  Surgeon: Alessandro Dale MD;  Location: [facility];  Service: Gastroenterology    IA ESOPHAGOGASTRODUODENOSCOPY TRANSORAL DIAGNOSTIC N/A 4/24/2019    Procedure: ESOPHAGOGASTRODUODENOSCOPY (EGD);  Surgeon: Alessandro Dale MD;  Location: [facility];  Service: Gastroenterology      Family History   Problem Relation Age of Onset    Cancer Mother     Cancer Father       Social History     Tobacco Use    Smoking status: Never    Smokeless tobacco: Never   Vaping Use    Vaping status: Never Used   Substance Use Topics    Alcohol use: Yes     Alcohol/week: 7.0 standard drinks of alcohol     Types: 7 Glasses of wine per week     Comment: socially     Drug use: Never      E-Cigarette/Vaping    E-Cigarette Use Never User       E-Cigarette/Vaping Substances    Nicotine No     THC No     CBD No     Flavoring No     Other No     Unknown No       I have reviewed and agree with the history as documented.     Yamila Estrada is a 84 y.o.  year old female  Past Medical History:  No date: Anomaly of rib      Comment:  diagnosed with \"sliders\" of ribs   No date: Aortic aneurysm (HCC)      Comment:  4.1 cm on December 6, 2018  No date: Chronic kidney disease  No date: CKD (chronic kidney disease) stage 2, GFR 60-89 ml/min  No date: Colon polyps  No date: Dementia (HCC)  No date: Hyperlipidemia  No date: Hypertension  No date: Insomnia  No date: Memory loss  No date: Osteopenia with elevated frax score  No date: Osteoporosis  04/2019: Tubular adenoma of colon  No date: Uterine fibroid  Social History    Tobacco Use      Smoking status: Never      Smokeless tobacco: Never    Vaping Use      Vaping status: Never Used    Alcohol use: Yes      " Alcohol/week: 7.0 standard drinks of alcohol      Types: 7 Glasses of wine per week      Comment: socially     Drug use: Never    Patient presents with:  Fall: Pt coming from [facility] after unwitnessed fall, unknown head strike or LOC, takes ASA daily, h/o dementia, pt presents with significant right hip tenderness and pain        History obtained directly from the PATIENT              Fall      Review of Systems   Unable to perform ROS: Dementia           Objective       ED Triage Vitals   Temperature Pulse Blood Pressure Respirations SpO2 Patient Position - Orthostatic VS   10/06/24 1549 10/06/24 1549 10/06/24 1549 10/06/24 1549 10/06/24 1549 10/06/24 1549   98.7 °F (37.1 °C) 72 148/69 16 95 % Lying      Temp Source Heart Rate Source BP Location FiO2 (%) Pain Score    10/06/24 1549 10/06/24 1600 10/06/24 1549 -- --    Axillary Monitor Right arm        Vitals      Date and Time Temp Pulse SpO2 Resp BP Pain Score FACES Pain Rating User   10/06/24 2001 -- 74 96 % 20 126/64 -- --    10/06/24 2000 -- 70 96 % 20 -- -- --    10/06/24 1930 -- 73 94 % 20 156/72 -- --    10/06/24 1900 -- 70 -- 20 -- -- --    10/06/24 1700 -- 68 93 % 16 119/69 -- -- [facility]   10/06/24 1600 -- 72 95 % 16 103/57 -- -- [facility]   10/06/24 1549 98.7 °F (37.1 °C) 72 95 % 16 148/69 -- -- [facility]            Physical Exam  Vitals reviewed.   Constitutional:       Appearance: Normal appearance.   HENT:      Head: Normocephalic and atraumatic.      Comments: No external evidence of trauma     Right Ear: External ear normal.      Left Ear: External ear normal.      Mouth/Throat:      Mouth: Mucous membranes are moist.   Eyes:      Pupils: Pupils are equal, round, and reactive to light.   Cardiovascular:      Rate and Rhythm: Normal rate.   Pulmonary:      Effort: Pulmonary effort is normal.   Abdominal:      General: Abdomen is flat. Bowel sounds are normal.   Musculoskeletal:      Comments: Hip not externally rotated.  Mildly painful to range of motion.   Good neurovascular function distally.   Skin:     Capillary Refill: Capillary refill takes less than 2 seconds.   Neurological:      General: No focal deficit present.      Mental Status: She is alert.         Results Reviewed       None            XR pelvis ap only 1 or 2 vw   ED Interpretation by Roderick Novak MD (10/06 1636)   Radiology studies have been independently visualized by me.  Preliminary interpretation: no fracture or dislocation or foreign bodies   All radiology studies will be officially read by the radiologist and any discrepancies flagged and follow through the discrepancy management process to make the patient aware of significant results.        Final Interpretation by Faheem Skaggs MD (10/07 0809)      No acute osseous abnormality.         Computerized Assisted Algorithm (CAA) may have been used to analyze all applicable images.         Workstation performed: [id number]         CT head without contrast   Final Interpretation by Jamie Melendez MD (10/06 1707)      No acute intracranial abnormality.                  Workstation performed: [id number]             Procedures    ED Medication and Procedure Management   Prior to Admission Medications   Prescriptions Last Dose Informant Patient Reported? Taking?   acetaminophen (TYLENOL) 325 mg tablet  Care Giver Yes No   Sig: Take 650 mg by mouth every 6 (six) hours as needed   alendronate (FOSAMAX) 70 mg tablet  Care Giver No No   Sig: Take 1 tablet (70 mg total) by mouth every 7 days   aspirin 81 MG tablet  Care Giver No No   Sig: Take 1 tablet (81 mg total) by mouth daily   cholecalciferol (VITAMIN D3) 1,000 units tablet  Care Giver Yes No   Sig: Take 2,000 Units by mouth daily   memantine (NAMENDA) 10 mg tablet  Care Giver No No   Sig: TAKE ONE TABLET BY MOUTH TWICE A DAY   mirtazapine (REMERON) 15 mg tablet   No No   Sig: TAKE ONE TABLET BY MOUTH EVERY DAY AT BEDTIME   neomycin-polymyxin-hydrocortisone (CORTISPORIN) otic solution  Care  Giver No No   Sig: Administer 4 drops into both ears every 6 (six) hours   pantoprazole (PROTONIX) 40 mg tablet  Care Giver No No   Sig: Take 1 tablet (40 mg total) by mouth daily      Facility-Administered Medications: None     Discharge Medication List as of 10/6/2024  4:43 PM        CONTINUE these medications which have NOT CHANGED    Details   acetaminophen (TYLENOL) 325 mg tablet Take 650 mg by mouth every 6 (six) hours as needed, Historical Med      alendronate (FOSAMAX) 70 mg tablet Take 1 tablet (70 mg total) by mouth every 7 days, Starting Mon 6/6/2022, Until Fri 9/16/2022, Normal      aspirin 81 MG tablet Take 1 tablet (81 mg total) by mouth daily, Starting Fri 7/26/2019, No Print      cholecalciferol (VITAMIN D3) 1,000 units tablet Take 2,000 Units by mouth daily, Historical Med      memantine (NAMENDA) 10 mg tablet TAKE ONE TABLET BY MOUTH TWICE A DAY, Normal      mirtazapine (REMERON) 15 mg tablet TAKE ONE TABLET BY MOUTH EVERY DAY AT BEDTIME, Normal      neomycin-polymyxin-hydrocortisone (CORTISPORIN) otic solution Administer 4 drops into both ears every 6 (six) hours, Starting Mon 4/18/2022, Normal      pantoprazole (PROTONIX) 40 mg tablet Take 1 tablet (40 mg total) by mouth daily, Starting Mon 4/18/2022, Normal           No discharge procedures on file.  ED SEPSIS DOCUMENTATION   Time reflects when diagnosis was documented in both MDM as applicable and the Disposition within this note       Time User Action Codes Description Comment    10/6/2024  4:43 PM Roderick Novak [W19.XXXA] Fall, initial encounter     10/6/2024  4:43 PM Roderick Novak [S70.00XA] Contusion of hip                  Roderick Novak MD  10/07/24 4791